# Patient Record
Sex: FEMALE | Race: WHITE | Employment: FULL TIME | URBAN - METROPOLITAN AREA
[De-identification: names, ages, dates, MRNs, and addresses within clinical notes are randomized per-mention and may not be internally consistent; named-entity substitution may affect disease eponyms.]

---

## 2019-06-24 PROCEDURE — 84300 ASSAY OF URINE SODIUM: CPT | Performed by: FAMILY MEDICINE

## 2019-06-24 PROCEDURE — 87624 HPV HI-RISK TYP POOLED RSLT: CPT | Performed by: FAMILY MEDICINE

## 2019-06-24 PROCEDURE — 88175 CYTOPATH C/V AUTO FLUID REDO: CPT | Performed by: FAMILY MEDICINE

## 2019-08-06 PROBLEM — M54.16 RIGHT LUMBAR RADICULOPATHY: Status: ACTIVE | Noted: 2019-08-06

## 2019-09-13 PROCEDURE — 84133 ASSAY OF URINE POTASSIUM: CPT | Performed by: INTERNAL MEDICINE

## 2019-09-13 PROCEDURE — 84540 ASSAY OF URINE/UREA-N: CPT | Performed by: INTERNAL MEDICINE

## 2019-09-13 PROCEDURE — 84300 ASSAY OF URINE SODIUM: CPT | Performed by: INTERNAL MEDICINE

## 2019-09-13 PROCEDURE — 83935 ASSAY OF URINE OSMOLALITY: CPT | Performed by: INTERNAL MEDICINE

## 2019-09-13 PROCEDURE — 83930 ASSAY OF BLOOD OSMOLALITY: CPT | Performed by: INTERNAL MEDICINE

## 2019-09-23 PROCEDURE — 87075 CULTR BACTERIA EXCEPT BLOOD: CPT | Performed by: OPHTHALMOLOGY

## 2019-09-23 PROCEDURE — 87205 SMEAR GRAM STAIN: CPT | Performed by: OPHTHALMOLOGY

## 2019-09-23 PROCEDURE — 87070 CULTURE OTHR SPECIMN AEROBIC: CPT | Performed by: OPHTHALMOLOGY

## 2019-10-04 PROCEDURE — 87206 SMEAR FLUORESCENT/ACID STAI: CPT | Performed by: OPHTHALMOLOGY

## 2019-10-04 PROCEDURE — 87102 FUNGUS ISOLATION CULTURE: CPT | Performed by: OPHTHALMOLOGY

## 2020-05-26 PROBLEM — M75.102 ROTATOR CUFF SYNDROME, LEFT: Status: ACTIVE | Noted: 2020-05-26

## 2020-07-15 PROBLEM — M75.122 COMPLETE ROTATOR CUFF TEAR OF LEFT SHOULDER: Status: ACTIVE | Noted: 2020-07-15

## 2020-07-15 PROBLEM — S46.812A FULL THICKNESS TEAR OF LEFT SUBSCAPULARIS TENDON: Status: ACTIVE | Noted: 2020-07-15

## 2021-05-21 PROBLEM — M48.062 SPINAL STENOSIS OF LUMBAR REGION WITH NEUROGENIC CLAUDICATION: Status: ACTIVE | Noted: 2021-05-21

## 2021-05-21 PROBLEM — M43.16 SPONDYLOLISTHESIS OF LUMBAR REGION: Status: ACTIVE | Noted: 2021-05-21

## 2021-08-16 PROBLEM — M54.16 LUMBAR RADICULOPATHY: Status: ACTIVE | Noted: 2021-08-16

## 2021-09-07 PROBLEM — G62.9 NEUROPATHY: Status: ACTIVE | Noted: 2021-09-07

## 2021-09-07 PROBLEM — M21.372 FOOT DROP, BILATERAL: Status: ACTIVE | Noted: 2021-09-07

## 2021-09-07 PROBLEM — R26.81 UNSTABLE GAIT: Status: ACTIVE | Noted: 2021-09-07

## 2021-09-07 PROBLEM — M21.371 FOOT DROP, BILATERAL: Status: ACTIVE | Noted: 2021-09-07

## 2021-10-04 RX ORDER — ACETAMINOPHEN 500 MG
1000 TABLET ORAL ONCE
Status: CANCELLED | OUTPATIENT
Start: 2021-10-04 | End: 2021-10-04

## 2021-10-16 ENCOUNTER — LABORATORY ENCOUNTER (OUTPATIENT)
Dept: LAB | Age: 61
End: 2021-10-16
Attending: ORTHOPAEDIC SURGERY
Payer: COMMERCIAL

## 2021-10-16 DIAGNOSIS — Z01.818 PRE-OP TESTING: ICD-10-CM

## 2021-10-16 DIAGNOSIS — G62.9 NEUROPATHY: ICD-10-CM

## 2021-10-16 PROCEDURE — 87081 CULTURE SCREEN ONLY: CPT

## 2021-10-16 PROCEDURE — 86900 BLOOD TYPING SEROLOGIC ABO: CPT

## 2021-10-16 PROCEDURE — 86901 BLOOD TYPING SEROLOGIC RH(D): CPT

## 2021-10-16 PROCEDURE — 86850 RBC ANTIBODY SCREEN: CPT

## 2021-10-21 ENCOUNTER — ANESTHESIA EVENT (OUTPATIENT)
Dept: SURGERY | Facility: HOSPITAL | Age: 61
DRG: 455 | End: 2021-10-21
Payer: COMMERCIAL

## 2021-10-22 NOTE — PAT NURSING NOTE
Dr. Eyal Orlando reviewed patients chart for low sodium. Order received to obtain a note of medical clearance. Faxed request to PCP as well as a courtesy fax to the surgeon and fax confirmation received.   Telephoned Dr. Mariusz Berman office and spoke wit

## 2021-10-25 NOTE — PAT NURSING NOTE
Called PCP office and spoke with Fransisca Arguello RN requesting medical clearance be faxed over ASAP.

## 2021-10-25 NOTE — PAT NURSING NOTE
Spoke with Pippa Dye RN at PCP office requesting medical clearance for surgery tomorrow be faxed over when completed. Pt is in the office now.

## 2021-10-26 ENCOUNTER — HOSPITAL ENCOUNTER (INPATIENT)
Facility: HOSPITAL | Age: 61
LOS: 7 days | Discharge: NURSING, REHAB OR LONG-TERM CARE | DRG: 455 | End: 2021-11-02
Attending: ORTHOPAEDIC SURGERY | Admitting: ORTHOPAEDIC SURGERY
Payer: COMMERCIAL

## 2021-10-26 ENCOUNTER — APPOINTMENT (OUTPATIENT)
Dept: GENERAL RADIOLOGY | Facility: HOSPITAL | Age: 61
DRG: 455 | End: 2021-10-26
Attending: ORTHOPAEDIC SURGERY
Payer: COMMERCIAL

## 2021-10-26 ENCOUNTER — ANESTHESIA (OUTPATIENT)
Dept: SURGERY | Facility: HOSPITAL | Age: 61
DRG: 455 | End: 2021-10-26
Payer: COMMERCIAL

## 2021-10-26 DIAGNOSIS — G62.9 NEUROPATHY: Primary | ICD-10-CM

## 2021-10-26 DIAGNOSIS — M43.16 SPONDYLOLISTHESIS OF LUMBAR REGION: ICD-10-CM

## 2021-10-26 PROCEDURE — 4A11X4G MONITORING OF PERIPHERAL NERVOUS ELECTRICAL ACTIVITY, INTRAOPERATIVE, EXTERNAL APPROACH: ICD-10-PCS | Performed by: ORTHOPAEDIC SURGERY

## 2021-10-26 PROCEDURE — 95861 NEEDLE EMG 2 EXTREMITIES: CPT | Performed by: ORTHOPAEDIC SURGERY

## 2021-10-26 PROCEDURE — 76000 FLUOROSCOPY <1 HR PHYS/QHP: CPT | Performed by: ORTHOPAEDIC SURGERY

## 2021-10-26 PROCEDURE — 0SG0071 FUSION OF LUMBAR VERTEBRAL JOINT WITH AUTOLOGOUS TISSUE SUBSTITUTE, POSTERIOR APPROACH, POSTERIOR COLUMN, OPEN APPROACH: ICD-10-PCS | Performed by: ORTHOPAEDIC SURGERY

## 2021-10-26 PROCEDURE — 01NB0ZZ RELEASE LUMBAR NERVE, OPEN APPROACH: ICD-10-PCS | Performed by: ORTHOPAEDIC SURGERY

## 2021-10-26 PROCEDURE — 0SG00A0 FUSION OF LUMBAR VERTEBRAL JOINT WITH INTERBODY FUSION DEVICE, ANTERIOR APPROACH, ANTERIOR COLUMN, OPEN APPROACH: ICD-10-PCS | Performed by: ORTHOPAEDIC SURGERY

## 2021-10-26 PROCEDURE — 95940 IONM IN OPERATNG ROOM 15 MIN: CPT | Performed by: ORTHOPAEDIC SURGERY

## 2021-10-26 PROCEDURE — 0ST20ZZ RESECTION OF LUMBAR VERTEBRAL DISC, OPEN APPROACH: ICD-10-PCS | Performed by: ORTHOPAEDIC SURGERY

## 2021-10-26 PROCEDURE — 95938 SOMATOSENSORY TESTING: CPT | Performed by: ORTHOPAEDIC SURGERY

## 2021-10-26 DEVICE — RELINE MAS MOD SCREW 6.5X45 2C: Type: IMPLANTABLE DEVICE | Site: BACK | Status: FUNCTIONAL

## 2021-10-26 DEVICE — RELINE LCK SCRW 5.5 OPEN TULIP: Type: IMPLANTABLE DEVICE | Site: BACK | Status: FUNCTIONAL

## 2021-10-26 DEVICE — OSTEOCEL PRO MEDIUM: Type: IMPLANTABLE DEVICE | Site: BACK | Status: FUNCTIONAL

## 2021-10-26 DEVICE — RELINE MAS TI ROD 5.5X35 LRDTC: Type: IMPLANTABLE DEVICE | Site: BACK | Status: FUNCTIONAL

## 2021-10-26 DEVICE — MODULUS XLW, 10X22X55MM 15°
Type: IMPLANTABLE DEVICE | Site: BACK | Status: FUNCTIONAL
Brand: MODULUS

## 2021-10-26 DEVICE — RELINE MAS RED SCREW 6.5X45 2C: Type: IMPLANTABLE DEVICE | Site: BACK | Status: FUNCTIONAL

## 2021-10-26 DEVICE — RELINE MAS MOD REDUCTION EXT: Type: IMPLANTABLE DEVICE | Site: BACK | Status: FUNCTIONAL

## 2021-10-26 DEVICE — I-FACTOR PUTTY, 1.0CC SYRINGE
Type: IMPLANTABLE DEVICE | Site: BACK | Status: FUNCTIONAL
Brand: I-FACTOR PEPTIDE ENHANCED BONE GRAFT

## 2021-10-26 RX ORDER — NALOXONE HYDROCHLORIDE 0.4 MG/ML
80 INJECTION, SOLUTION INTRAMUSCULAR; INTRAVENOUS; SUBCUTANEOUS AS NEEDED
Status: DISCONTINUED | OUTPATIENT
Start: 2021-10-26 | End: 2021-10-26 | Stop reason: HOSPADM

## 2021-10-26 RX ORDER — DIAZEPAM 5 MG/ML
5 INJECTION, SOLUTION INTRAMUSCULAR; INTRAVENOUS
Status: DISCONTINUED | OUTPATIENT
Start: 2021-10-26 | End: 2021-10-26 | Stop reason: HOSPADM

## 2021-10-26 RX ORDER — SODIUM CHLORIDE, SODIUM LACTATE, POTASSIUM CHLORIDE, CALCIUM CHLORIDE 600; 310; 30; 20 MG/100ML; MG/100ML; MG/100ML; MG/100ML
INJECTION, SOLUTION INTRAVENOUS CONTINUOUS
Status: DISCONTINUED | OUTPATIENT
Start: 2021-10-26 | End: 2021-10-26 | Stop reason: HOSPADM

## 2021-10-26 RX ORDER — MORPHINE SULFATE 4 MG/ML
4 INJECTION, SOLUTION INTRAMUSCULAR; INTRAVENOUS EVERY 2 HOUR PRN
Status: DISCONTINUED | OUTPATIENT
Start: 2021-10-26 | End: 2021-11-02

## 2021-10-26 RX ORDER — DEXAMETHASONE SODIUM PHOSPHATE 4 MG/ML
4 VIAL (ML) INJECTION AS NEEDED
Status: DISCONTINUED | OUTPATIENT
Start: 2021-10-26 | End: 2021-10-26 | Stop reason: HOSPADM

## 2021-10-26 RX ORDER — OXYCODONE HYDROCHLORIDE AND ACETAMINOPHEN 5; 325 MG/1; MG/1
2 TABLET ORAL EVERY 4 HOURS PRN
Status: DISCONTINUED | OUTPATIENT
Start: 2021-10-26 | End: 2021-11-02

## 2021-10-26 RX ORDER — OXYCODONE HYDROCHLORIDE AND ACETAMINOPHEN 5; 325 MG/1; MG/1
1 TABLET ORAL EVERY 4 HOURS PRN
Status: DISCONTINUED | OUTPATIENT
Start: 2021-10-26 | End: 2021-11-02

## 2021-10-26 RX ORDER — METOCLOPRAMIDE HYDROCHLORIDE 5 MG/ML
INJECTION INTRAMUSCULAR; INTRAVENOUS AS NEEDED
Status: DISCONTINUED | OUTPATIENT
Start: 2021-10-26 | End: 2021-10-26 | Stop reason: SURG

## 2021-10-26 RX ORDER — TRAZODONE HYDROCHLORIDE 50 MG/1
50 TABLET ORAL NIGHTLY PRN
Status: DISCONTINUED | OUTPATIENT
Start: 2021-10-26 | End: 2021-11-02

## 2021-10-26 RX ORDER — METOCLOPRAMIDE HYDROCHLORIDE 5 MG/ML
10 INJECTION INTRAMUSCULAR; INTRAVENOUS AS NEEDED
Status: DISCONTINUED | OUTPATIENT
Start: 2021-10-26 | End: 2021-10-26 | Stop reason: HOSPADM

## 2021-10-26 RX ORDER — ONDANSETRON 2 MG/ML
4 INJECTION INTRAMUSCULAR; INTRAVENOUS EVERY 4 HOURS PRN
Status: ACTIVE | OUTPATIENT
Start: 2021-10-26 | End: 2021-10-27

## 2021-10-26 RX ORDER — LIDOCAINE HYDROCHLORIDE AND EPINEPHRINE 10; 10 MG/ML; UG/ML
INJECTION, SOLUTION INFILTRATION; PERINEURAL AS NEEDED
Status: DISCONTINUED | OUTPATIENT
Start: 2021-10-26 | End: 2021-10-26 | Stop reason: HOSPADM

## 2021-10-26 RX ORDER — GABAPENTIN 300 MG/1
600 CAPSULE ORAL NIGHTLY
Status: DISCONTINUED | OUTPATIENT
Start: 2021-10-26 | End: 2021-10-26

## 2021-10-26 RX ORDER — ACETAMINOPHEN 500 MG
1000 TABLET ORAL ONCE AS NEEDED
Status: DISCONTINUED | OUTPATIENT
Start: 2021-10-26 | End: 2021-10-26 | Stop reason: HOSPADM

## 2021-10-26 RX ORDER — SODIUM PHOSPHATE, DIBASIC AND SODIUM PHOSPHATE, MONOBASIC 7; 19 G/133ML; G/133ML
1 ENEMA RECTAL ONCE AS NEEDED
Status: DISCONTINUED | OUTPATIENT
Start: 2021-10-26 | End: 2021-11-02

## 2021-10-26 RX ORDER — LISINOPRIL 20 MG/1
20 TABLET ORAL DAILY
COMMUNITY
End: 2022-01-26

## 2021-10-26 RX ORDER — PROCHLORPERAZINE EDISYLATE 5 MG/ML
10 INJECTION INTRAMUSCULAR; INTRAVENOUS EVERY 6 HOURS PRN
Status: ACTIVE | OUTPATIENT
Start: 2021-10-26 | End: 2021-10-28

## 2021-10-26 RX ORDER — ONDANSETRON 2 MG/ML
INJECTION INTRAMUSCULAR; INTRAVENOUS AS NEEDED
Status: DISCONTINUED | OUTPATIENT
Start: 2021-10-26 | End: 2021-10-26 | Stop reason: SURG

## 2021-10-26 RX ORDER — DEXAMETHASONE SODIUM PHOSPHATE 4 MG/ML
VIAL (ML) INJECTION AS NEEDED
Status: DISCONTINUED | OUTPATIENT
Start: 2021-10-26 | End: 2021-10-26 | Stop reason: SURG

## 2021-10-26 RX ORDER — ZOLPIDEM TARTRATE 5 MG/1
5 TABLET ORAL NIGHTLY PRN
Status: DISCONTINUED | OUTPATIENT
Start: 2021-10-26 | End: 2021-11-02

## 2021-10-26 RX ORDER — GABAPENTIN 100 MG/1
200 CAPSULE ORAL 2 TIMES DAILY
Status: DISCONTINUED | OUTPATIENT
Start: 2021-10-26 | End: 2021-10-29

## 2021-10-26 RX ORDER — SCOLOPAMINE TRANSDERMAL SYSTEM 1 MG/1
PATCH, EXTENDED RELEASE TRANSDERMAL
Status: DISCONTINUED
Start: 2021-10-26 | End: 2021-10-26 | Stop reason: WASHOUT

## 2021-10-26 RX ORDER — DOCUSATE SODIUM 100 MG/1
100 CAPSULE, LIQUID FILLED ORAL 2 TIMES DAILY
Status: DISCONTINUED | OUTPATIENT
Start: 2021-10-26 | End: 2021-11-02

## 2021-10-26 RX ORDER — DIAZEPAM 5 MG/1
5 TABLET ORAL EVERY 6 HOURS PRN
Status: DISCONTINUED | OUTPATIENT
Start: 2021-10-26 | End: 2021-11-02

## 2021-10-26 RX ORDER — METHYLPREDNISOLONE ACETATE 40 MG/ML
INJECTION, SUSPENSION INTRA-ARTICULAR; INTRALESIONAL; INTRAMUSCULAR; SOFT TISSUE AS NEEDED
Status: DISCONTINUED | OUTPATIENT
Start: 2021-10-26 | End: 2021-10-26 | Stop reason: HOSPADM

## 2021-10-26 RX ORDER — LIDOCAINE HYDROCHLORIDE 10 MG/ML
INJECTION, SOLUTION EPIDURAL; INFILTRATION; INTRACAUDAL; PERINEURAL AS NEEDED
Status: DISCONTINUED | OUTPATIENT
Start: 2021-10-26 | End: 2021-10-26 | Stop reason: SURG

## 2021-10-26 RX ORDER — MIDAZOLAM HYDROCHLORIDE 1 MG/ML
INJECTION INTRAMUSCULAR; INTRAVENOUS AS NEEDED
Status: DISCONTINUED | OUTPATIENT
Start: 2021-10-26 | End: 2021-10-26 | Stop reason: SURG

## 2021-10-26 RX ORDER — BISACODYL 10 MG
10 SUPPOSITORY, RECTAL RECTAL
Status: DISCONTINUED | OUTPATIENT
Start: 2021-10-26 | End: 2021-11-02

## 2021-10-26 RX ORDER — SODIUM CHLORIDE 9 MG/ML
INJECTION, SOLUTION INTRAVENOUS CONTINUOUS PRN
Status: DISCONTINUED | OUTPATIENT
Start: 2021-10-26 | End: 2021-10-26 | Stop reason: SURG

## 2021-10-26 RX ORDER — AMLODIPINE BESYLATE 5 MG/1
5 TABLET ORAL DAILY
COMMUNITY
End: 2021-11-02

## 2021-10-26 RX ORDER — REMIFENTANIL HYDROCHLORIDE 1 MG/ML
INJECTION, POWDER, LYOPHILIZED, FOR SOLUTION INTRAVENOUS AS NEEDED
Status: DISCONTINUED | OUTPATIENT
Start: 2021-10-26 | End: 2021-10-26 | Stop reason: SURG

## 2021-10-26 RX ORDER — UPADACITINIB 15 MG/1
15 TABLET, EXTENDED RELEASE ORAL DAILY
Status: ON HOLD | COMMUNITY
End: 2021-10-27

## 2021-10-26 RX ORDER — CEFAZOLIN SODIUM/WATER 2 G/20 ML
2 SYRINGE (ML) INTRAVENOUS EVERY 8 HOURS
Status: COMPLETED | OUTPATIENT
Start: 2021-10-26 | End: 2021-10-27

## 2021-10-26 RX ORDER — ROCURONIUM BROMIDE 10 MG/ML
INJECTION, SOLUTION INTRAVENOUS AS NEEDED
Status: DISCONTINUED | OUTPATIENT
Start: 2021-10-26 | End: 2021-10-26 | Stop reason: SURG

## 2021-10-26 RX ORDER — ACETAMINOPHEN 325 MG/1
650 TABLET ORAL EVERY 4 HOURS PRN
Status: DISCONTINUED | OUTPATIENT
Start: 2021-10-26 | End: 2021-11-02

## 2021-10-26 RX ORDER — VANCOMYCIN HYDROCHLORIDE 1 G/20ML
INJECTION, POWDER, LYOPHILIZED, FOR SOLUTION INTRAVENOUS AS NEEDED
Status: DISCONTINUED | OUTPATIENT
Start: 2021-10-26 | End: 2021-10-26 | Stop reason: HOSPADM

## 2021-10-26 RX ORDER — HYDROMORPHONE HYDROCHLORIDE 1 MG/ML
0.4 INJECTION, SOLUTION INTRAMUSCULAR; INTRAVENOUS; SUBCUTANEOUS EVERY 5 MIN PRN
Status: DISCONTINUED | OUTPATIENT
Start: 2021-10-26 | End: 2021-10-26 | Stop reason: HOSPADM

## 2021-10-26 RX ORDER — GABAPENTIN 300 MG/1
300 CAPSULE ORAL DAILY
Status: DISCONTINUED | OUTPATIENT
Start: 2021-10-27 | End: 2021-10-26

## 2021-10-26 RX ORDER — MIDAZOLAM HYDROCHLORIDE 1 MG/ML
1 INJECTION INTRAMUSCULAR; INTRAVENOUS EVERY 5 MIN PRN
Status: DISCONTINUED | OUTPATIENT
Start: 2021-10-26 | End: 2021-10-26 | Stop reason: HOSPADM

## 2021-10-26 RX ORDER — OXYCODONE HYDROCHLORIDE AND ACETAMINOPHEN 5; 325 MG/1; MG/1
1 TABLET ORAL EVERY 6 HOURS PRN
Qty: 50 TABLET | Refills: 0 | Status: SHIPPED | OUTPATIENT
Start: 2021-10-26 | End: 2021-11-09

## 2021-10-26 RX ORDER — POLYETHYLENE GLYCOL 3350 17 G/17G
17 POWDER, FOR SOLUTION ORAL DAILY PRN
Status: DISCONTINUED | OUTPATIENT
Start: 2021-10-26 | End: 2021-11-02

## 2021-10-26 RX ORDER — MORPHINE SULFATE 2 MG/ML
2 INJECTION, SOLUTION INTRAMUSCULAR; INTRAVENOUS EVERY 2 HOUR PRN
Status: DISCONTINUED | OUTPATIENT
Start: 2021-10-26 | End: 2021-11-02

## 2021-10-26 RX ORDER — BUPIVACAINE HYDROCHLORIDE 2.5 MG/ML
INJECTION, SOLUTION EPIDURAL; INFILTRATION; INTRACAUDAL AS NEEDED
Status: DISCONTINUED | OUTPATIENT
Start: 2021-10-26 | End: 2021-10-26 | Stop reason: HOSPADM

## 2021-10-26 RX ORDER — CYCLOBENZAPRINE HCL 5 MG
5 TABLET ORAL EVERY 6 HOURS PRN
Status: DISCONTINUED | OUTPATIENT
Start: 2021-10-26 | End: 2021-11-02

## 2021-10-26 RX ORDER — DIPHENHYDRAMINE HYDROCHLORIDE 50 MG/ML
25 INJECTION INTRAMUSCULAR; INTRAVENOUS EVERY 4 HOURS PRN
Status: DISCONTINUED | OUTPATIENT
Start: 2021-10-26 | End: 2021-11-02

## 2021-10-26 RX ORDER — MEPERIDINE HYDROCHLORIDE 25 MG/ML
12.5 INJECTION INTRAMUSCULAR; INTRAVENOUS; SUBCUTANEOUS AS NEEDED
Status: DISCONTINUED | OUTPATIENT
Start: 2021-10-26 | End: 2021-10-26 | Stop reason: HOSPADM

## 2021-10-26 RX ORDER — HYDROCODONE BITARTRATE AND ACETAMINOPHEN 5; 325 MG/1; MG/1
1 TABLET ORAL AS NEEDED
Status: DISCONTINUED | OUTPATIENT
Start: 2021-10-26 | End: 2021-10-26 | Stop reason: HOSPADM

## 2021-10-26 RX ORDER — DIAZEPAM 5 MG/ML
INJECTION, SOLUTION INTRAMUSCULAR; INTRAVENOUS
Status: COMPLETED
Start: 2021-10-26 | End: 2021-10-26

## 2021-10-26 RX ORDER — LABETALOL HYDROCHLORIDE 5 MG/ML
5 INJECTION, SOLUTION INTRAVENOUS EVERY 5 MIN PRN
Status: DISCONTINUED | OUTPATIENT
Start: 2021-10-26 | End: 2021-10-26 | Stop reason: HOSPADM

## 2021-10-26 RX ORDER — DIPHENHYDRAMINE HCL 25 MG
25 CAPSULE ORAL EVERY 4 HOURS PRN
Status: DISCONTINUED | OUTPATIENT
Start: 2021-10-26 | End: 2021-11-02

## 2021-10-26 RX ORDER — SENNOSIDES 8.6 MG
17.2 TABLET ORAL NIGHTLY
Status: DISCONTINUED | OUTPATIENT
Start: 2021-10-26 | End: 2021-11-02

## 2021-10-26 RX ORDER — CEFAZOLIN SODIUM/WATER 2 G/20 ML
2 SYRINGE (ML) INTRAVENOUS ONCE
Status: COMPLETED | OUTPATIENT
Start: 2021-10-26 | End: 2021-10-26

## 2021-10-26 RX ORDER — SODIUM CHLORIDE, SODIUM LACTATE, POTASSIUM CHLORIDE, CALCIUM CHLORIDE 600; 310; 30; 20 MG/100ML; MG/100ML; MG/100ML; MG/100ML
INJECTION, SOLUTION INTRAVENOUS CONTINUOUS
Status: DISCONTINUED | OUTPATIENT
Start: 2021-10-26 | End: 2021-11-02

## 2021-10-26 RX ORDER — HYDROCODONE BITARTRATE AND ACETAMINOPHEN 5; 325 MG/1; MG/1
2 TABLET ORAL AS NEEDED
Status: DISCONTINUED | OUTPATIENT
Start: 2021-10-26 | End: 2021-10-26 | Stop reason: HOSPADM

## 2021-10-26 RX ORDER — ONDANSETRON 2 MG/ML
4 INJECTION INTRAMUSCULAR; INTRAVENOUS AS NEEDED
Status: DISCONTINUED | OUTPATIENT
Start: 2021-10-26 | End: 2021-10-26 | Stop reason: HOSPADM

## 2021-10-26 RX ORDER — MORPHINE SULFATE 2 MG/ML
1 INJECTION, SOLUTION INTRAMUSCULAR; INTRAVENOUS EVERY 2 HOUR PRN
Status: DISCONTINUED | OUTPATIENT
Start: 2021-10-26 | End: 2021-11-02

## 2021-10-26 RX ADMIN — DEXAMETHASONE SODIUM PHOSPHATE 8 MG: 4 MG/ML VIAL (ML) INJECTION at 10:54:00

## 2021-10-26 RX ADMIN — LIDOCAINE HYDROCHLORIDE 50 MG: 10 INJECTION, SOLUTION EPIDURAL; INFILTRATION; INTRACAUDAL; PERINEURAL at 10:46:00

## 2021-10-26 RX ADMIN — METOCLOPRAMIDE HYDROCHLORIDE 10 MG: 5 INJECTION INTRAMUSCULAR; INTRAVENOUS at 13:01:00

## 2021-10-26 RX ADMIN — CEFAZOLIN SODIUM/WATER 2 G: 2 G/20 ML SYRINGE (ML) INTRAVENOUS at 10:58:00

## 2021-10-26 RX ADMIN — SODIUM CHLORIDE, SODIUM LACTATE, POTASSIUM CHLORIDE, CALCIUM CHLORIDE: 600; 310; 30; 20 INJECTION, SOLUTION INTRAVENOUS at 10:55:00

## 2021-10-26 RX ADMIN — ROCURONIUM BROMIDE 10 MG: 10 INJECTION, SOLUTION INTRAVENOUS at 10:46:00

## 2021-10-26 RX ADMIN — MIDAZOLAM HYDROCHLORIDE 3 MG: 1 INJECTION INTRAMUSCULAR; INTRAVENOUS at 10:34:00

## 2021-10-26 RX ADMIN — SODIUM CHLORIDE, SODIUM LACTATE, POTASSIUM CHLORIDE, CALCIUM CHLORIDE: 600; 310; 30; 20 INJECTION, SOLUTION INTRAVENOUS at 10:29:00

## 2021-10-26 RX ADMIN — ONDANSETRON 4 MG: 2 INJECTION INTRAMUSCULAR; INTRAVENOUS at 13:01:00

## 2021-10-26 RX ADMIN — SODIUM CHLORIDE: 9 INJECTION, SOLUTION INTRAVENOUS at 13:07:00

## 2021-10-26 RX ADMIN — REMIFENTANIL HYDROCHLORIDE 20 MCG: 1 INJECTION, POWDER, LYOPHILIZED, FOR SOLUTION INTRAVENOUS at 13:15:00

## 2021-10-26 NOTE — ANESTHESIA POSTPROCEDURE EVALUATION
[address] Patient Status:  Inpatient   Age/Gender 57year old female MRN [id number]   Location [address] [facility] Attending Nikita Mcclelland MD   Hosp Day # 0 PCP Nishi Wilhelm MD       Anesthesia

## 2021-10-26 NOTE — CONSULTS
[facility] Hospitalist Consult Note     Mark Tang Patient Status:  Inpatient    1960 MRN [id number]   [facility] 3SW-A Attending Alexey Kim MD   Hosp Day # 0 PCP Jocelyn Baeza MD     Consult: Jemal Sawyer cell carcinoma   • Heart Disorder Father         a fib    • Thyroid Disorder Mother         goiter, thyroid surgery, now on Synthroid   • Breast Cancer Maternal Aunt 65        60's       Allergies: No Known Allergies    Medications:  No current facility-ad nondistended. Positive bowel sounds. No rebound, guarding or organomegaly. Back brace in place   Neurologic: No focal neurological deficits. CNII-XII grossly intact. Bilateral pedal sensation to light touch diminished but at baseline per patient.    Muscul

## 2021-10-26 NOTE — H&P
Patient: Deon Becker  Medical Record Number: [id number]  Referring Physician:  Celi Smith  PCP: Diane Guaman MD        HISTORY OF CHIEF COMPLAINT:    Deon Becker is a 57year old female, Patient is here today ref by Kennedy Agee Diabetes Maternal Grandmother           type 2 diabetes   • Other (cva) Maternal Grandmother     • Diabetes Paternal Grandmother           type 2 diabetes   • Psychiatric Sister           anxiety   • Genito-Urinary Disorder Father           BPH   • Cancer EXAMIMATION   PHYSICAL EXAMINATION: Merna Quintanilla is a 57year old female who is observed sitting in the exam room alert and oriented times three. RESPIRATORY RATE: 18 She looks consistent her stated age.     Ht 5' 2\" (1.575 m)   Wt 130 lb (59 kg) however the patient's sagittal plane demonstrates a grade 1 spondylolisthesis with severe degenerative disc disease L4-5 there is notable degenerative disc disease throughout the lumbar spine  MRI: MRI demonstrates severe stenosis both centrally in the lat resolved. By 18 months postoperatively, there is a 10% risk of persistent sensory deficit and a 3% risk of extremity weakness due to to this specific approach.   The majority of lower extremity weakness is a result of psoas muscle injury from the exposure, restart.      She  has had previous anesthesia:  No.  Previous complications:  No, but no previous general anesthesia.  Previous spinal >30 years ago without complication.      Social History    Tobacco Use      Smoking status: Former Smoker        Packs/da point review of symptoms completed with pertinent positives and negatives as listed in the above HPI.      PHYSICAL EXAM:   /80   Pulse 86   Resp 18   Wt 130 lb (59 kg)   SpO2 98%   BMI 23.78 kg/m²    Repeat BP  / 72  General: awake, alert in the anesthesiologist of record, as well as the surgeon and patient .      Elsy Berry  10/25/2021    The above referenced H&P was reviewed by Andrew Gonzalez PA-C on 10/26/2021, and no significant changes have occurred in the patient's condition since

## 2021-10-26 NOTE — ANESTHESIA PREPROCEDURE EVALUATION
PRE-OP EVALUATION    Patient Name: Leo Bone    Admit Diagnosis: Spondylolisthesis of lumbar region [M43.16]    Pre-op Diagnosis: Spondylolisthesis of lumbar region [M43.16]    Lumbar 4 - Lumbar 5 Extreme Lateral Interbody Fusion with Posterior allergies. Anesthesia Evaluation    Patient summary reviewed. Anesthetic Complications  (-) history of anesthetic complications         GI/Hepatic/Renal                                 Cardiovascular      ECG reviewed.   Exercise tolerance: good patient. Comment: D/w the patient the risks and benefits of general anesthesia including sore throat, nausea, and intraoperative awareness.     Plan/risks discussed with: patient                Present on Admission:  **None**

## 2021-10-26 NOTE — PROGRESS NOTES
NURSING ADMISSION NOTE      Patient admitted via bed. Oriented to room. Safety precautions initiated. Bed in low position. Call light in reach. Patient alert and oriented. Back to right shoulder and lower back.  Ioban dressings in place, CDI to

## 2021-10-26 NOTE — PROGRESS NOTES
S: Patient awake in PACU. Severe back pain. No numbness or tingling. Inspection:  Awake alert No acute distress. No difficulty breathing     Blood pressure (!) 172/91, pulse 67, temperature 97.8 °F (36.6 °C), temperature source Temporal, resp.  rate 20,

## 2021-10-26 NOTE — ANESTHESIA PROCEDURE NOTES
Airway  Date/Time: 10/26/2021 10:47 AM  Urgency: elective    Airway not difficult    General Information and Staff    Patient location during procedure: OR  Anesthesiologist: Dominguez Johnson MD  Performed: anesthesiologist     Indications and Patient Condi

## 2021-10-26 NOTE — BRIEF OP NOTE
Pre-Operative Diagnosis: Spondylolisthesis of lumbar region [M43.16]     Post-Operative Diagnosis: Spondylolisthesis of lumbar region [M43.16]      Procedure Performed:   Lumbar 4 - Lumbar 5 Extreme Lateral Interbody Fusion with Posterior Screws Left Sided

## 2021-10-26 NOTE — OPERATIVE REPORT
Operative Report  Patient Name:  Lynn Doherty  Date: 10/26/2021  Preoperative Diagnosis: Lumbar Radiculopathy, Degenerative Disc Disease, Spondylolisthesis  Postoperative Diagnosis: Same  Primary Surgeon: Renetta Mccartney MD  Assistant: Mai Vasques fluoroscopic image for the lateral incision and the posteriorly for the disk spaces. A 2 inch skin incision was made on the left. Electrocautery was used to incise through the subcutaneous fat. Obliques were dissected using blunt finger dissection.  Remi Decker facet joint, pars and transverse process was palpated. Pedicle screws were placed using standard fluoroscopically guided pedicle screw technique.   Jamshidis' were placed at the lateral border of the pedicle and inserted to a depth of 25-30mm using AP flour from anesthesia and transferred to the PACU in stable condition.                   A physician assistant was necessary during the case to provide positioning, exposure, hemostasis, safety and retraction and to manage wound suction so that I could operate wi

## 2021-10-27 ENCOUNTER — APPOINTMENT (OUTPATIENT)
Dept: MRI IMAGING | Facility: HOSPITAL | Age: 61
DRG: 455 | End: 2021-10-27
Attending: PHYSICIAN ASSISTANT
Payer: COMMERCIAL

## 2021-10-27 ENCOUNTER — APPOINTMENT (OUTPATIENT)
Dept: CT IMAGING | Facility: HOSPITAL | Age: 61
DRG: 455 | End: 2021-10-27
Attending: PHYSICIAN ASSISTANT
Payer: COMMERCIAL

## 2021-10-27 PROCEDURE — 72131 CT LUMBAR SPINE W/O DYE: CPT | Performed by: PHYSICIAN ASSISTANT

## 2021-10-27 PROCEDURE — 97530 THERAPEUTIC ACTIVITIES: CPT

## 2021-10-27 PROCEDURE — 97535 SELF CARE MNGMENT TRAINING: CPT

## 2021-10-27 PROCEDURE — 72148 MRI LUMBAR SPINE W/O DYE: CPT | Performed by: PHYSICIAN ASSISTANT

## 2021-10-27 PROCEDURE — 97162 PT EVAL MOD COMPLEX 30 MIN: CPT

## 2021-10-27 PROCEDURE — 97165 OT EVAL LOW COMPLEX 30 MIN: CPT

## 2021-10-27 PROCEDURE — 85014 HEMATOCRIT: CPT | Performed by: PHYSICIAN ASSISTANT

## 2021-10-27 PROCEDURE — 85018 HEMOGLOBIN: CPT | Performed by: PHYSICIAN ASSISTANT

## 2021-10-27 RX ORDER — LISINOPRIL 20 MG/1
20 TABLET ORAL DAILY
Status: DISCONTINUED | OUTPATIENT
Start: 2021-10-27 | End: 2021-11-02

## 2021-10-27 RX ORDER — AMLODIPINE BESYLATE 5 MG/1
5 TABLET ORAL DAILY
Status: DISCONTINUED | OUTPATIENT
Start: 2021-10-27 | End: 2021-10-31

## 2021-10-27 RX ORDER — UPADACITINIB 15 MG/1
15 TABLET, EXTENDED RELEASE ORAL DAILY
Qty: 30 TABLET | Refills: 0 | Status: SHIPPED | OUTPATIENT
Start: 2021-11-10 | End: 2021-11-24

## 2021-10-27 NOTE — CM/SW NOTE
PMR recommending acute rehab. Spoke with Chelsea Bui from [facility] who confirmed pt can be accepted for admission pending insurance auth. They are in network with pt's insurance plan. Await insurance auth and medical clearance for DC.   / to

## 2021-10-27 NOTE — OCCUPATIONAL THERAPY NOTE
OCCUPATIONAL THERAPY EVALUATION - INPATIENT     Room Number: 357/357-A  Evaluation Date: 10/27/2021  Type of Evaluation: Initial  Presenting Problem: s/p L4-5 PXLIF 10/26/21    Physician Order: IP Consult to Occupational Therapy  Reason for Therapy: ADL/IA being attacked by RA. \"    OBJECTIVE  Precautions: Spine;Bed/chair alarm  Fall Risk: High fall risk    WEIGHT BEARING RESTRICTION  Weight Bearing Restriction: None                PAIN ASSESSMENT  Ratin  Location: back  Management Techniques:  Activity p 33.39  CMS Modifier (G-Code): CK    FUNCTIONAL TRANSFER ASSESSMENT  Supine to Sit : Moderate assistance  Sit to Stand: Minimum assistance    Skilled Therapy Provided:  Activities performed this session include: Education on spinal precautions,  Patient End of Session: Up in chair; With [facility] staff;Needs met;Call light within reach;RN aware of session/findings; All patient questions and concerns addressed; Other (Comment) (PT arriving for session)    ASSESSMENT   Patient is a 57year old  female admit therapy records    Specific performance deficits impacting engagement in ADL/IADL LOW  1 - 3 performance deficits    Client Assessment/Performance Deficits LOW - No comorbidities nor modifications of tasks    Clinical Decision Making LOW - Analysis of occu

## 2021-10-27 NOTE — PROGRESS NOTES
S: Patient awake and smiling in bed. She states she has minimal back pain. She feels much better than yesterday. Has not ambulated. She is had baseline numbness and tingling in her hands. Wishes to discuss rehab options with social work today.      Wilberto Victoria

## 2021-10-27 NOTE — PLAN OF CARE
Assumed patient care at 2300. Patient alert and oriented x4. Afebrile. On 3LO2 with O2 sats above 95%. No chest pain. PRN pain medication given for lower back pain with relief. Dressings on Lateral Posterior clean, dry and intact.  On scheduled CT and MRI o

## 2021-10-27 NOTE — PHYSICAL THERAPY NOTE
PHYSICAL THERAPY EVALUATION - INPATIENT     Room Number: 357/357-A  Evaluation Date: 10/27/2021  Type of Evaluation: Initial  Physician Order: PT Eval and Treat    Presenting Problem: s/p L4-L5 FUSION  Reason for Therapy: Mobility Dysfunction and Dis following:    Right Hip flexion  3-/5  Left Hip flexion  3-/5  Right Knee extension  3+/5  Left Knee extension  3+/5  Right Dorsiflexion  3+/5  Left Dorsiflexion  3+/5    BALANCE  Static Sitting: Good  Dynamic Sitting: Fair -  Static Standing: Poor +  Jackie re: christie recs. Exercise/Education Provided:  Bed mobility  Body mechanics  Functional activity tolerated  Gait training  Transfer training    Patient End of Session: Up in chair;Needs met;Call light within reach;RN aware of session/findings; All patient device: walker - rolling at assistance level: supervision     Goal #4 Patient will negotiate 1 flight of stairs with supervision to ensure safety at home.    Goal #5    Goal #6    Goal Comments: Goals established on 10/27/2021    Writer worfranco droplet mask g

## 2021-10-27 NOTE — PLAN OF CARE
Back pain managed by Percocet. No new numbness or tingling to lower extremities other than baseline neuropathy. Worked with PT/OT - discharge planning in place for rehab. CT done, MRI still pending today.  Paged Dr Antonio Conrad to clarify about Rinvoq - ordered by

## 2021-10-27 NOTE — PROGRESS NOTES
Assisted patient to sit on side of bed. Ioban intact to back and left side. No drainage noted. Reviewed indications, side effects of pain medication/narcotics and constipation prevention.  Stressed importance of increased fluids/roughage in diet, continued

## 2021-10-27 NOTE — CM/SW NOTE
MARICARMEN met with pt to discuss discharge planning. Pt called dtr as well and was present during conversation. Pt voiced interest to go to  from rehab. SW educated pt on [facility] vs [facility], pt verbalized understanding.  Pt reported that if she could not go to [facility], she wo

## 2021-10-28 PROCEDURE — 97110 THERAPEUTIC EXERCISES: CPT

## 2021-10-28 PROCEDURE — 97530 THERAPEUTIC ACTIVITIES: CPT

## 2021-10-28 RX ORDER — PSEUDOEPHEDRINE HCL 30 MG
100 TABLET ORAL 2 TIMES DAILY
Qty: 60 CAPSULE | Refills: 0 | Status: SHIPPED | OUTPATIENT
Start: 2021-10-28 | End: 2022-01-31 | Stop reason: ALTCHOICE

## 2021-10-28 NOTE — PROGRESS NOTES
Reviewed prevention of constipation side effect  from narcotics. Teachback done again on ankle pumps and incentive spriometry use. Reviewed dressing changes and showering. Discharge education video begun for patient.  Solicited and answered any questions pt

## 2021-10-28 NOTE — CONSULTS
PHYSICAL MEDICINE AND REHABILITATION CONSULTATION       Location [facility] 3SW-A Attending Inderjit Jones MD   Hosp Day # 1 PCP Naldo Gallagher MD     Patient Identification  Ellen Gunter is a 57year old female.   :  1960  Adm (MIRALAX) powder packet 17 g, 17 g, Oral, Daily PRN  magnesium hydroxide (MILK OF MAGNESIA) 400 MG/5ML suspension 30 mL, 30 mL, Oral, Daily PRN  bisacodyl (DULCOLAX) rectal suppository 10 mg, 10 mg, Rectal, Daily PRN  Fleet Enema (FLEET) 7-19 GM/118ML enem Grandmother         type 2 diabetes   • Psychiatric Sister         anxiety   • Genito-Urinary Disorder Father         BPH   • Cancer Father         basal cell carcinoma   • Heart Disorder Father         a fib    • Thyroid Disorder Mother         goiter, th supple, symmetrical, no thyromegaly appreciated  Lymph: no cervical and no axillary lymphadenopathy  Lungs: Non labored on RA, no wheezing appreciated, No accessory muscle noted  Heart: Reg Rate, no edema noted in BLE  Abdomen: soft, non-tender, non-disten participate in 3 hours of therapy 5 days a week.   • The patient is reasonably expected to actively participate in and benefit significantly from the intensive rehabilitation therapy program within a prescribed period of time of 10  days, with goal of disch

## 2021-10-28 NOTE — PROGRESS NOTES
S: Patient awake and smiling seated in bed. She has moderate back pain which is controlled. She has baseline numbness and tingling in her hands and feet. She is eager to start the next step of her recovery and get to [facility].      Inspection:  Awake alert No ac ulcerations. LYMPH EXAM: There is no lymph edema in either lower extremity. VASCULAR EXAM:  Good distal perfusion. No clubbing or cyanosis. Calves supple, NT bilaterally  ABDOMINAL EXAM: Abdomen is soft, NT.       A/P: POD # 2 s/p L4-5 PXLIF    P: Continue

## 2021-10-28 NOTE — PLAN OF CARE
Pt is alert and oriented X4 on room air. . I/S. Bilateral SCD. Voiding freely. Last bowel movement on 10/25. Loban dressing to lower and lateral back dressing is clean, dry and intact with no drainage. PO and IV medication given for pain.  Pt went down t

## 2021-10-28 NOTE — CM/SW NOTE
MARICARMEN met with pt and updated her that insurance is still pending for MJ. SW provided [facility] list for pt in case insurance does not authorize [facility]J stay. Pt agreeable and provided understanding. Pt chose [facility] in case [facility] is not authorized.      SW to up

## 2021-10-28 NOTE — PLAN OF CARE
Patient alert and oriented x4. VSS, on RA. Pain to lower back, controlled with PO percocet. Gel ice intermittently placed depending on pt's tolerance. Miralax given this AM LBM 10/25. Ioban dressings CDI. Tolerating regular diet.  Patient transfers x1 perso

## 2021-10-28 NOTE — PHYSICAL THERAPY NOTE
PHYSICAL THERAPY TREATMENT NOTE - INPATIENT    Room Number: 357/357-A     Session: 1     Number of Visits to Meet Established Goals: 5    Presenting Problem: s/p L4-L5 FUSION    ASSESSMENT     Pt able to participate in standing circuit training exercise RESTRICTION  Weight Bearing Restriction: None                PAIN ASSESSMENT   Rating: 3  Location: incisional       BALANCE                                                                                                                       Static Sittin Sitting     Repetitions   10   Sets   1     Patient End of Session: Up in chair;Needs met;Call light within reach;RN aware of session/findings; All patient questions and concerns addressed; Alarm set       Therapist PPE: surgical mask, goggles and gloves dur

## 2021-10-28 NOTE — PROGRESS NOTES
[facility] Hospitalist Progress Note                                                                   [address]  1/20/1960    CC: FU post op    Interval History:  - Doing well      Curr spondylolisthesis with radiculopathy   RA   HTN     Recommendations     Lumbar spondylolisthesis with radiculopathy   - s/p L4-L5 fusion 10/26  - periop antibiotics, postop mgmt per drain control   - avoid antiplatelet agents  - PT/OT     RA   - cont home

## 2021-10-28 NOTE — PROGRESS NOTES
[facility] Hospitalist Progress Note                                                                   [address]  1/20/1960    CC: FU post op    Interval History:  - Doing well      Curr L4-L5 fusion     Problem List / Diagnoses     Lumbar spondylolisthesis with radiculopathy   RA   HTN     Recommendations     Lumbar spondylolisthesis with radiculopathy   - s/p L4-L5 fusion 10/26  - periop antibiotics, postop mgmt per drain control   - anshul

## 2021-10-29 PROCEDURE — 97110 THERAPEUTIC EXERCISES: CPT

## 2021-10-29 PROCEDURE — 97535 SELF CARE MNGMENT TRAINING: CPT

## 2021-10-29 PROCEDURE — 97530 THERAPEUTIC ACTIVITIES: CPT

## 2021-10-29 RX ORDER — GABAPENTIN 100 MG/1
200 CAPSULE ORAL 3 TIMES DAILY
Status: DISCONTINUED | OUTPATIENT
Start: 2021-10-29 | End: 2021-11-02

## 2021-10-29 NOTE — PROGRESS NOTES
S: Patient awake and seated in bed. She has controlled back pain. Is beginning to have bilateral shooting foot pains. Awaiting insurance auth for rehab. Inspection:  Awake alert No acute distress.  No difficulty breathing     Blood pressure 127/70, puls

## 2021-10-29 NOTE — PLAN OF CARE
Pt is alert and oriented X4 on room air. . I/S encourage. SCD. Voiding via purewick at night and freely during day. Last bowel movement 10/25. Pt denied miralax. PO medication for pain. Ioban dressing to left side is clean, dry and intact.  Reminder to u

## 2021-10-29 NOTE — PROGRESS NOTES
[facility] Hospitalist Progress Note                                                                   [address]  1/20/1960    CC: FU post op    Interval History:  - Doing well, awaiting Diagnoses     Lumbar spondylolisthesis with radiculopathy   RA   HTN     Recommendations     Lumbar spondylolisthesis with radiculopathy   - s/p L4-L5 fusion 10/26  - periop antibiotics, postop mgmt per drain control   - PT/OT     RA   [facility]

## 2021-10-29 NOTE — PLAN OF CARE
A&O x4. VSS on RA. Back pain controlled on PO medication. Ambulating with mod assist x1 w/walker. Voids freely, BRP. Bilateral Teds and SCDs. Ioban dressing C/D/I, gel ice applied as tolerated. Reviewed POC w/pt who agrees. Will continue to monitor.   Insur

## 2021-10-30 PROCEDURE — 97110 THERAPEUTIC EXERCISES: CPT

## 2021-10-30 PROCEDURE — 97116 GAIT TRAINING THERAPY: CPT

## 2021-10-30 NOTE — PLAN OF CARE
Alert and oriented,V/S stable,,O2 at 2L/nc when sleeping. FOOT DROP to both but chronic,not using any brace. Ambulates to the bathroom with one assist.Has carpal tunnel and arthritis in both hands,has pains too. Taking 2 pain medications each time,with mod

## 2021-10-30 NOTE — PHYSICAL THERAPY NOTE
PHYSICAL THERAPY TREATMENT NOTE - INPATIENT    Room Number: 357/357-A     Session: 2     Number of Visits to Meet Established Goals: 5    Presenting Problem: s/p L4-L5 FUSION    ASSESSMENT     Pt eager to work with writer this AM - completed multiple jolie Static Sitting: Good  Dynamic Sitting: Fair -           Static Standing: Poor +  Dynamic Standing: Poor +    ACTIVITY TOLERANCE                         O2 WALK         AM-PAC '6-Clicks' INPATIENT SHORT FORM - BASIC MOBILITY  Ho Therapist PPE: surgical mask, goggles and gloves during session    Patient/Family PPE: Mask on at all times

## 2021-10-30 NOTE — OCCUPATIONAL THERAPY NOTE
OCCUPATIONAL THERAPY TREATMENT NOTE - INPATIENT     Room Number: 357/357-A  Session: 1   Number of Visits to Meet Established Goals: 5    Presenting Problem: s/p L4-5 PXLIF 10/26/21    History related to current admission: Patient is 57year old female adm Degree of Impairment: 50.11%  Standardized Score (AM-PAC Scale): 37.26  CMS Modifier (G-Code): CK    FUNCTIONAL TRANSFER ASSESSMENT  Supine to Sit : Contact guard assist  Sit to Stand: Contact guard assist    Skilled Therapy Provided: Patient educated on O functional mobility d/t decreased strength, AROM, balance, coordination. Patient will benefit from continued skilled OT intervention to maximize ADL I and safety. OT Discharge Recommendations: Acute rehabilitation  OT Device Recommendations: Marianner; Sanam

## 2021-10-30 NOTE — CM/SW NOTE
MARICARMEN contacted [employer] to determine if insurance Rigo Mcqueen is obtained. [employer] states they are still showing pending, however they will contact this writer if insurance approval is obtained today. MARICARMEN to follow.

## 2021-10-30 NOTE — PROGRESS NOTES
Spoke with patient's nurse, he will reach out to social work and check on insurance clearance for rehab placement. Also spoke with patient over the phone. She states she is doing well. She has already ambulated with therapy today is is making progress.

## 2021-10-30 NOTE — PLAN OF CARE
POD 4 lumbar fusion, Pt is AAOX4, VSS, room air, IV SL, SCD's, voiding freely to restroom, BM today, up min assist w/ RW, PT / OT following, plan is discharge to rehab, waiting on INS AUTH, PO meds for pain control, Pt doing well, all needs met, all safety

## 2021-10-30 NOTE — PLAN OF CARE
Patient alert and oriented x4. VSS, on RA. Pain to lower back, controlled with PO PRN pain medication. Ioban dressings, CDI. Patient transfers x1 person assist with walker. BLE footdrop present. Numbness to BLE at baseline. Patient tolerating general diet.

## 2021-10-30 NOTE — PROGRESS NOTES
[facility] Hospitalist Progress Note                                                                   [address]  1/20/1960    CC: FU post op    Interval History:  - Doing well, awaiting spondylolisthesis with radiculopathy   RA   HTN     Recommendations     Lumbar spondylolisthesis with radiculopathy   - s/p L4-L5 fusion 10/26  - periop antibiotics, postop mgmt per drain control   - PT/OT     RA   - cont home upadacitinib      HTN  - bp s

## 2021-10-31 PROCEDURE — 97535 SELF CARE MNGMENT TRAINING: CPT

## 2021-10-31 PROCEDURE — 97530 THERAPEUTIC ACTIVITIES: CPT

## 2021-10-31 RX ORDER — AMLODIPINE BESYLATE 2.5 MG/1
2.5 TABLET ORAL DAILY
Status: DISCONTINUED | OUTPATIENT
Start: 2021-11-01 | End: 2021-11-02

## 2021-10-31 NOTE — OCCUPATIONAL THERAPY NOTE
OCCUPATIONAL THERAPY TREATMENT NOTE - INPATIENT     Room Number: 357/357-A  Session: 2   Number of Visits to Meet Established Goals: 5    Presenting Problem: s/p L4-5 PXLIF 10/26/21    History related to current admission: Patient is 57year old female adm teeth?: None  -   Eating meals?: None    AM-PAC Score:  Score: 17  Approx Degree of Impairment: 50.11%  Standardized Score (AM-PAC Scale): 37.26  CMS Modifier (G-Code): CK    FUNCTIONAL TRANSFER ASSESSMENT  Supine to Sit : Contact guard assist  Sit to [employer] right side of bed at home; mobility around bed via RW, min assist, increased time; supine via light min assist to raise LLE to bed level and cues for technique, sitting EOB via min assist to raise trunk; functional mobility around bed to bedside chair via

## 2021-10-31 NOTE — PROGRESS NOTES
[facility] Hospitalist Progress Note                                                                   [address]  1/20/1960    CC: FU post op    Interval History:  - Doing well, awaiting fusion     Problem List / Diagnoses     Lumbar spondylolisthesis with radiculopathy   RA   HTN     Recommendations     Lumbar spondylolisthesis with radiculopathy   - s/p L4-L5 fusion 10/26  - periop antibiotics, postop mgmt per drain control   - PT/OT

## 2021-10-31 NOTE — PLAN OF CARE
Pt A&Ox4. On room air. IS encouraged. SCDs to BLE. Incision to low back and L flank with coverlet drsg C/D/I. Tolerating general diet. Last BM 10/30/21. Miralax given this AM. Voiding without difficulty. Pain controlled with PO medications.  Pt up with min/

## 2021-10-31 NOTE — PLAN OF CARE
A&Ox4. VSS. On room air. . Last BM 10/30. Voiding freely. PO medication for pain control. Coverlet to back C/D/I. Ambulating with min assist. Patient updated on plan of care. Safety precautions in place. Call light within reach.  Will continue to monitor

## 2021-11-01 PROCEDURE — 97110 THERAPEUTIC EXERCISES: CPT

## 2021-11-01 PROCEDURE — 97116 GAIT TRAINING THERAPY: CPT

## 2021-11-01 PROCEDURE — 97535 SELF CARE MNGMENT TRAINING: CPT

## 2021-11-01 NOTE — PROGRESS NOTES
[facility] Hospitalist Progress Note                                                                   [address]  1/20/1960    CC: FU post op    Interval History:  - feeling okay, no new Diagnoses     Lumbar spondylolisthesis with radiculopathy   RA   HTN     Recommendations     Lumbar spondylolisthesis with radiculopathy   - s/p L4-L5 fusion 10/26  - PT/OT, bowel regimen     RA   - upadacitinib as outpt     HTN  -  Decreased Norvasc to 2.

## 2021-11-01 NOTE — PROGRESS NOTES
Spine Service Progress Note    24hrs/Events:   none    Subjective:   Pain improving. Strength improving.  Waiting for GARY    Objective:   /68 (BP Location: Right arm)   Pulse 79   Temp 98 °F (36.7 °C) (Oral)   Resp 20   Ht 5' 2\" (1.575 m)   Wt 12

## 2021-11-01 NOTE — CM/SW NOTE
If the patient is not approved for [facility], her choice is [facility]. Reserved in [address] but asked not to pursue auth until an official denial from [address].     Shanique Betancur, IRAM

## 2021-11-01 NOTE — PHYSICAL THERAPY NOTE
PHYSICAL THERAPY TREATMENT NOTE - INPATIENT    Room Number: 357/357-A     Session: 3   Number of Visits to Meet Established Goals: 5    Presenting Problem: s/p L4-L5 FUSION    History related to current admission: Pt was admitted from home on 10/26/2021 w person does the patient currently need. ..   -   Moving to and from a bed to a chair (including a wheelchair)?: A Little   -   Need to walk in hospital room?: A Little   -   Climbing 3-5 steps with a railing?: A Lot       AM-PAC Score:  Raw Score: 17   Appr ambulation distance and increase number of exercises this session. Continues with decreased balance, strength, and endurance.  Pt would benefit from continued PT.     DISCHARGE RECOMMENDATIONS  PT Discharge Recommendations: Acute rehabilitation     PLAN  PT

## 2021-11-01 NOTE — CM/SW NOTE
MSW received notification that the peer to peer was denied for KELVIN. MSW informed the patient and requested [facility] pursue [address] [facility]. Will await authorization for dc.     Michelle Le LCSW

## 2021-11-01 NOTE — OCCUPATIONAL THERAPY NOTE
OCCUPATIONAL THERAPY TREATMENT NOTE - INPATIENT     Room Number: 357/357-A  Session: 3   Number of Visits to Meet Established Goals: 5    Presenting Problem: s/p L4-5 PXLIF 10/26/21    History related to current admission: Patient is 57year old female adm None  -   Eating meals?: None    AM-PAC Score:  Score: 17  Approx Degree of Impairment: 50.11%  Standardized Score (AM-PAC Scale): 37.26  CMS Modifier (G-Code): CK    FUNCTIONAL TRANSFER ASSESSMENT  Supine to Sit : Not tested  Sit to Stand: Contact guard a aid;Long-handled shoehorn;Long-handled sponge    PLAN  OT Treatment Plan: Balance activities; ADL training;Functional transfer training; Endurance training;Patient/Family education;Patient/Family training; Compensatory technique education  Rehab Potential : G

## 2021-11-01 NOTE — PLAN OF CARE
Patient alert and oriented . Vital signs stable . Pain is controlled with oral pain meds . Dressing to left flank and back clean and dry . Encouraged use of incentive spirometer and ankle pumps .  Voiding ,use depends during the day and pure wick at night p

## 2021-11-01 NOTE — CM/SW NOTE
Peer to Peer scheduled for 4pm today for [facility] to speak with the insurance company. Patient is aware. Still awaiting auth for [facility]. If denied, will discuss VALENTE. Referrals for VALENTE pending.     Aurora Ramos LCSW

## 2021-11-02 VITALS
HEIGHT: 62 IN | RESPIRATION RATE: 20 BRPM | SYSTOLIC BLOOD PRESSURE: 106 MMHG | DIASTOLIC BLOOD PRESSURE: 58 MMHG | HEART RATE: 66 BPM | BODY MASS INDEX: 23.69 KG/M2 | TEMPERATURE: 98 F | OXYGEN SATURATION: 96 % | WEIGHT: 128.75 LBS

## 2021-11-02 PROCEDURE — 97530 THERAPEUTIC ACTIVITIES: CPT

## 2021-11-02 PROCEDURE — 97116 GAIT TRAINING THERAPY: CPT

## 2021-11-02 RX ORDER — GABAPENTIN 100 MG/1
200 CAPSULE ORAL 3 TIMES DAILY
Qty: 90 CAPSULE | Refills: 0 | Status: SHIPPED | OUTPATIENT
Start: 2021-11-02 | End: 2022-01-02

## 2021-11-02 RX ORDER — AMLODIPINE BESYLATE 2.5 MG/1
2.5 TABLET ORAL DAILY
Qty: 30 TABLET | Refills: 0 | Status: SHIPPED | OUTPATIENT
Start: 2021-11-03 | End: 2022-01-26

## 2021-11-02 NOTE — CM/SW NOTE
Insurance auth pending for [employer]. Updates and therapy notes sent to [facility] via [address]. Spoke with Michael Babb from [facility] who requested documentation of appropriate diagnoses for trazadone and valium.   Also they are unable to provide pt's RA med, rinvoq

## 2021-11-02 NOTE — PLAN OF CARE
Pt a&O. On room air. Encouraged use of incentive spirometer and ankle pump exercises every hour while awake. Teds/Scds to BLE. Tolerating diet with good appetite. Had BM today. Voiding w/o difficulty. Pain managed with oral medications.  Pt reminded to [place]

## 2021-11-02 NOTE — CM/SW NOTE
Spoke with Barbara Zuñiga from [facility] who confirmed they have received insurance auth and can accept pt for admission today. They are requesting rapid COVID testing prior to DC. Updated pt's RN who will complete COVID test prior to DC.   Met with pt who expr

## 2021-11-02 NOTE — PLAN OF CARE
Pt a&O. On room air. Encouraged use of incentive spirometer and ankle pump exercises every hour while awake. Teds/Scds to BLE. Tolerating diet with good appetite. Last BM 11/1/21. Voiding w/o difficulty. Pain managed with oral medications.  Pt reminded to \

## 2021-11-02 NOTE — PROGRESS NOTES
[facility] Hospitalist Progress Note                                                                   [address]  1/20/1960    CC: FU post op    Interval History:  - doing okay, no sob/d Diagnoses     Lumbar spondylolisthesis with radiculopathy   RA   HTN     Recommendations     Lumbar spondylolisthesis with radiculopathy   - s/p L4-L5 fusion 10/26  - PT/OT, bowel regimen     RA   - upadacitinib as outpt     HTN  -  Decreased Norvasc to 2.

## 2021-11-02 NOTE — DIETARY NOTE
[address] [place] [address] admitted on 10/26 presents with low back pain. Pt had surgery on 10/26 - L4-5 extreme lateral interbody fusion with posterior screws L sided.      PMH: Back problem, Benign essential hypertension

## 2021-11-02 NOTE — PHYSICAL THERAPY NOTE
PHYSICAL THERAPY TREATMENT NOTE - INPATIENT    Room Number: 357/357-A     Session: 4   Number of Visits to Meet Established Goals: 5    Presenting Problem: s/p L4-L5 FUSION    History related to current admission: Pt was admitted from home on 10/26/2021 w another person does the patient currently need. ..    Help from Another: Moving to and from a bed to a chair (including a wheelchair)?: A Little   Help from Another: Need to walk in hospital room?: A Little   Help from Another: Climbing 3-5 steps with a rail addressed;SCDs in place; Alarm set    ASSESSMENT   Pt tolerated activity well.  Pt completed several bouts of short distance ambulation to address endurance, and was able to increase overall distance compared to previous sessions but reports increased BLE mu

## 2021-11-02 NOTE — PLAN OF CARE
Pt A&Ox4, VSS on room air- IS encouraged. Teds/SCDs to BLE. Incisions to lower back and lateral back dressed w/ coverlet C/D/I. Pain controlled w/ PO meds. Voiding w/o difficulty- uses Purewick at night; last BM 11/1/21.  Awaiting insurance auth for Provide

## 2021-11-03 NOTE — DISCHARGE SUMMARY
[facility]      Discharge Summary    Kaylin Baxter Patient Status:  Inpatient    1960 MRN [id number]   [facility] 3SW-A Attending No att. providers found   Hosp Day # 7 PCP Rocio Guerrero MD     Date of Admission: instructions were given and they were asked to follow up in clinic in 2 weeks.      Disposition: SNF      Discharge Medications: Discharge Medication List as of 11/2/2021  5:37 PM    START taking these medications    docusate sodium 100 MG Oral Cap  Take 10

## 2021-11-09 ENCOUNTER — SNF ADMIT/H&P (OUTPATIENT)
Dept: INTERNAL MEDICINE CLINIC | Facility: SKILLED NURSING FACILITY | Age: 61
End: 2021-11-09

## 2021-11-09 DIAGNOSIS — Z51.89 ENCOUNTER FOR MEDICATION ADJUSTMENT: ICD-10-CM

## 2021-11-09 DIAGNOSIS — Z71.2 ENCOUNTER TO DISCUSS TEST RESULTS: ICD-10-CM

## 2021-11-09 DIAGNOSIS — E87.1 HYPONATREMIA: ICD-10-CM

## 2021-11-09 DIAGNOSIS — Z71.89 ENCOUNTER FOR MEDICATION REVIEW AND COUNSELING: ICD-10-CM

## 2021-11-09 DIAGNOSIS — Z09 FOLLOW UP: ICD-10-CM

## 2021-11-09 PROCEDURE — 99310 SBSQ NF CARE HIGH MDM 45: CPT | Performed by: NURSE PRACTITIONER

## 2021-11-09 RX ORDER — GABAPENTIN 400 MG/1
400 CAPSULE ORAL NIGHTLY
COMMUNITY
End: 2022-01-02

## 2021-11-09 RX ORDER — HYDROCODONE BITARTRATE AND ACETAMINOPHEN 5; 325 MG/1; MG/1
1 TABLET ORAL EVERY 6 HOURS PRN
COMMUNITY
End: 2022-01-26

## 2021-11-09 NOTE — PROGRESS NOTES
Tiffanie Perez  : 1960  Age 57year old  female patient is admitted to [facility] for VALENTE. Reason For Visit:Initial Assessment/Follow up S/P L4-5 fusion and hyponatremia.   [place] diabetes   • Psychiatric Sister         anxiety   • Genito-Urinary Disorder Father         BPH   • Cancer Father         basal cell carcinoma   • Heart Disorder Father         a fib    • Thyroid Disorder Mother         goiter, thyroid surgery, now on Synth no signs of infection noted. Skin/wound: Warm and moist. Lower back surgical incision-slightly tender,dressing CDI and no signs of infection noted. Refer to wound assessment notes. CATHETER/DRAINS/TUBES/LINES: None.   Neuro: AOx3, no focal deficit, follows

## 2022-01-26 PROBLEM — R20.2 NUMBNESS AND TINGLING OF BOTH FEET: Status: ACTIVE | Noted: 2022-01-26

## 2022-01-26 PROBLEM — R20.2 NUMBNESS AND TINGLING IN BOTH HANDS: Status: ACTIVE | Noted: 2022-01-26

## 2022-01-26 PROBLEM — R20.0 NUMBNESS AND TINGLING IN BOTH HANDS: Status: ACTIVE | Noted: 2022-01-26

## 2022-01-26 PROBLEM — R20.0 NUMBNESS AND TINGLING OF BOTH FEET: Status: ACTIVE | Noted: 2022-01-26

## 2022-01-26 PROBLEM — Z91.81 RISK FOR FALLS: Status: ACTIVE | Noted: 2022-01-26

## 2022-02-27 ENCOUNTER — LAB ENCOUNTER (OUTPATIENT)
Dept: LAB | Facility: HOSPITAL | Age: 62
End: 2022-02-27
Attending: ORTHOPAEDIC SURGERY
Payer: COMMERCIAL

## 2022-02-27 DIAGNOSIS — G95.9 CERVICAL MYELOPATHY (HCC): ICD-10-CM

## 2022-02-27 LAB
ANTIBODY SCREEN: NEGATIVE
RH BLOOD TYPE: NEGATIVE

## 2022-02-27 PROCEDURE — 86901 BLOOD TYPING SEROLOGIC RH(D): CPT

## 2022-02-27 PROCEDURE — 86850 RBC ANTIBODY SCREEN: CPT

## 2022-02-27 PROCEDURE — 86900 BLOOD TYPING SEROLOGIC ABO: CPT

## 2022-02-28 ENCOUNTER — ANESTHESIA EVENT (OUTPATIENT)
Dept: SURGERY | Facility: HOSPITAL | Age: 62
DRG: 454 | End: 2022-02-28
Payer: COMMERCIAL

## 2022-03-01 LAB — SARS-COV-2 RNA RESP QL NAA+PROBE: NOT DETECTED

## 2022-03-02 ENCOUNTER — HOSPITAL ENCOUNTER (INPATIENT)
Facility: HOSPITAL | Age: 62
LOS: 5 days | Discharge: NURSING, REHAB OR LONG-TERM CARE | DRG: 454 | End: 2022-03-07
Attending: ORTHOPAEDIC SURGERY | Admitting: ORTHOPAEDIC SURGERY
Payer: COMMERCIAL

## 2022-03-02 ENCOUNTER — ANESTHESIA (OUTPATIENT)
Dept: SURGERY | Facility: HOSPITAL | Age: 62
DRG: 454 | End: 2022-03-02
Payer: COMMERCIAL

## 2022-03-02 ENCOUNTER — APPOINTMENT (OUTPATIENT)
Dept: GENERAL RADIOLOGY | Facility: HOSPITAL | Age: 62
DRG: 454 | End: 2022-03-02
Attending: ORTHOPAEDIC SURGERY
Payer: COMMERCIAL

## 2022-03-02 DIAGNOSIS — G95.9 CERVICAL MYELOPATHY (HCC): Primary | ICD-10-CM

## 2022-03-02 PROCEDURE — 95940 IONM IN OPERATNG ROOM 15 MIN: CPT | Performed by: ORTHOPAEDIC SURGERY

## 2022-03-02 PROCEDURE — 95870 NDL EMG LMTD STD MUSC 1 XTR: CPT | Performed by: ORTHOPAEDIC SURGERY

## 2022-03-02 PROCEDURE — BR141ZZ FLUOROSCOPY OF CERVICAL FACET JOINT(S) USING LOW OSMOLAR CONTRAST: ICD-10-PCS | Performed by: ORTHOPAEDIC SURGERY

## 2022-03-02 PROCEDURE — 95939 C MOTOR EVOKED UPR&LWR LIMBS: CPT | Performed by: ORTHOPAEDIC SURGERY

## 2022-03-02 PROCEDURE — 0RG10A0 FUSION OF CERVICAL VERTEBRAL JOINT WITH INTERBODY FUSION DEVICE, ANTERIOR APPROACH, ANTERIOR COLUMN, OPEN APPROACH: ICD-10-PCS | Performed by: ORTHOPAEDIC SURGERY

## 2022-03-02 PROCEDURE — 76000 FLUOROSCOPY <1 HR PHYS/QHP: CPT | Performed by: ORTHOPAEDIC SURGERY

## 2022-03-02 PROCEDURE — 0RG1071 FUSION OF CERVICAL VERTEBRAL JOINT WITH AUTOLOGOUS TISSUE SUBSTITUTE, POSTERIOR APPROACH, POSTERIOR COLUMN, OPEN APPROACH: ICD-10-PCS | Performed by: ORTHOPAEDIC SURGERY

## 2022-03-02 PROCEDURE — 4A11X4G MONITORING OF PERIPHERAL NERVOUS ELECTRICAL ACTIVITY, INTRAOPERATIVE, EXTERNAL APPROACH: ICD-10-PCS | Performed by: ORTHOPAEDIC SURGERY

## 2022-03-02 PROCEDURE — 0RT30ZZ RESECTION OF CERVICAL VERTEBRAL DISC, OPEN APPROACH: ICD-10-PCS | Performed by: ORTHOPAEDIC SURGERY

## 2022-03-02 PROCEDURE — 95938 SOMATOSENSORY TESTING: CPT | Performed by: ORTHOPAEDIC SURGERY

## 2022-03-02 DEVICE — IMPLANTABLE DEVICE: Type: IMPLANTABLE DEVICE | Site: SPINE CERVICAL | Status: FUNCTIONAL

## 2022-03-02 DEVICE — MODULUS CERVICAL, 8X19X16MM 10°
Type: IMPLANTABLE DEVICE | Site: SPINE CERVICAL | Status: FUNCTIONAL
Brand: MODULUS

## 2022-03-02 DEVICE — SCREW 4X15 SELF TAP VAR: Type: IMPLANTABLE DEVICE | Site: SPINE CERVICAL | Status: FUNCTIONAL

## 2022-03-02 DEVICE — I-FACTOR PUTTY, 1.0CC SYRINGE
Type: IMPLANTABLE DEVICE | Site: SPINE CERVICAL | Status: FUNCTIONAL
Brand: I-FACTOR PEPTIDE ENHANCED BONE GRAFT

## 2022-03-02 RX ORDER — MORPHINE SULFATE 4 MG/ML
4 INJECTION, SOLUTION INTRAMUSCULAR; INTRAVENOUS EVERY 2 HOUR PRN
Status: DISCONTINUED | OUTPATIENT
Start: 2022-03-02 | End: 2022-03-07

## 2022-03-02 RX ORDER — SODIUM CHLORIDE, SODIUM LACTATE, POTASSIUM CHLORIDE, CALCIUM CHLORIDE 600; 310; 30; 20 MG/100ML; MG/100ML; MG/100ML; MG/100ML
INJECTION, SOLUTION INTRAVENOUS CONTINUOUS
Status: DISCONTINUED | OUTPATIENT
Start: 2022-03-02 | End: 2022-03-07

## 2022-03-02 RX ORDER — ZOLPIDEM TARTRATE 5 MG/1
5 TABLET ORAL NIGHTLY PRN
Status: DISCONTINUED | OUTPATIENT
Start: 2022-03-02 | End: 2022-03-07

## 2022-03-02 RX ORDER — PROCHLORPERAZINE EDISYLATE 5 MG/ML
10 INJECTION INTRAMUSCULAR; INTRAVENOUS EVERY 6 HOURS PRN
Status: ACTIVE | OUTPATIENT
Start: 2022-03-02 | End: 2022-03-04

## 2022-03-02 RX ORDER — BISACODYL 10 MG
10 SUPPOSITORY, RECTAL RECTAL
Status: DISCONTINUED | OUTPATIENT
Start: 2022-03-02 | End: 2022-03-07

## 2022-03-02 RX ORDER — MEPERIDINE HYDROCHLORIDE 25 MG/ML
12.5 INJECTION INTRAMUSCULAR; INTRAVENOUS; SUBCUTANEOUS AS NEEDED
Status: DISCONTINUED | OUTPATIENT
Start: 2022-03-02 | End: 2022-03-02 | Stop reason: HOSPADM

## 2022-03-02 RX ORDER — LABETALOL HYDROCHLORIDE 5 MG/ML
5 INJECTION, SOLUTION INTRAVENOUS EVERY 5 MIN PRN
Status: DISCONTINUED | OUTPATIENT
Start: 2022-03-02 | End: 2022-03-02 | Stop reason: HOSPADM

## 2022-03-02 RX ORDER — SODIUM PHOSPHATE, DIBASIC AND SODIUM PHOSPHATE, MONOBASIC 7; 19 G/133ML; G/133ML
1 ENEMA RECTAL ONCE AS NEEDED
Status: DISCONTINUED | OUTPATIENT
Start: 2022-03-02 | End: 2022-03-07

## 2022-03-02 RX ORDER — POLYETHYLENE GLYCOL 3350 17 G/17G
17 POWDER, FOR SOLUTION ORAL DAILY PRN
Status: DISCONTINUED | OUTPATIENT
Start: 2022-03-02 | End: 2022-03-07

## 2022-03-02 RX ORDER — METHYLPREDNISOLONE ACETATE 40 MG/ML
INJECTION, SUSPENSION INTRA-ARTICULAR; INTRALESIONAL; INTRAMUSCULAR; SOFT TISSUE AS NEEDED
Status: DISCONTINUED | OUTPATIENT
Start: 2022-03-02 | End: 2022-03-02 | Stop reason: HOSPADM

## 2022-03-02 RX ORDER — HYDROCODONE BITARTRATE AND ACETAMINOPHEN 5; 325 MG/1; MG/1
2 TABLET ORAL AS NEEDED
Status: DISCONTINUED | OUTPATIENT
Start: 2022-03-02 | End: 2022-03-02 | Stop reason: HOSPADM

## 2022-03-02 RX ORDER — HYDROCODONE BITARTRATE AND ACETAMINOPHEN 5; 325 MG/1; MG/1
2 TABLET ORAL EVERY 4 HOURS PRN
Status: DISCONTINUED | OUTPATIENT
Start: 2022-03-02 | End: 2022-03-07

## 2022-03-02 RX ORDER — DOCUSATE SODIUM 100 MG/1
100 CAPSULE, LIQUID FILLED ORAL 2 TIMES DAILY
Status: DISCONTINUED | OUTPATIENT
Start: 2022-03-02 | End: 2022-03-07

## 2022-03-02 RX ORDER — DEXAMETHASONE SODIUM PHOSPHATE 4 MG/ML
4 VIAL (ML) INJECTION AS NEEDED
Status: DISCONTINUED | OUTPATIENT
Start: 2022-03-02 | End: 2022-03-02 | Stop reason: HOSPADM

## 2022-03-02 RX ORDER — NEOSTIGMINE METHYLSULFATE 1 MG/ML
INJECTION INTRAVENOUS AS NEEDED
Status: DISCONTINUED | OUTPATIENT
Start: 2022-03-02 | End: 2022-03-02 | Stop reason: SURG

## 2022-03-02 RX ORDER — HYDROCODONE BITARTRATE AND ACETAMINOPHEN 5; 325 MG/1; MG/1
1 TABLET ORAL EVERY 6 HOURS PRN
Qty: 40 TABLET | Refills: 0 | Status: SHIPPED | OUTPATIENT
Start: 2022-03-02

## 2022-03-02 RX ORDER — ONDANSETRON 2 MG/ML
INJECTION INTRAMUSCULAR; INTRAVENOUS AS NEEDED
Status: DISCONTINUED | OUTPATIENT
Start: 2022-03-02 | End: 2022-03-02 | Stop reason: SURG

## 2022-03-02 RX ORDER — SCOLOPAMINE TRANSDERMAL SYSTEM 1 MG/1
PATCH, EXTENDED RELEASE TRANSDERMAL AS NEEDED
Status: DISCONTINUED | OUTPATIENT
Start: 2022-03-02 | End: 2022-03-02 | Stop reason: SURG

## 2022-03-02 RX ORDER — DIAZEPAM 5 MG/ML
INJECTION, SOLUTION INTRAMUSCULAR; INTRAVENOUS
Status: COMPLETED
Start: 2022-03-02 | End: 2022-03-02

## 2022-03-02 RX ORDER — HYDROCODONE BITARTRATE AND ACETAMINOPHEN 5; 325 MG/1; MG/1
1 TABLET ORAL EVERY 4 HOURS PRN
Status: DISCONTINUED | OUTPATIENT
Start: 2022-03-02 | End: 2022-03-07

## 2022-03-02 RX ORDER — SENNOSIDES 8.6 MG
17.2 TABLET ORAL NIGHTLY
Status: DISCONTINUED | OUTPATIENT
Start: 2022-03-02 | End: 2022-03-07

## 2022-03-02 RX ORDER — DIPHENHYDRAMINE HYDROCHLORIDE 50 MG/ML
25 INJECTION INTRAMUSCULAR; INTRAVENOUS EVERY 4 HOURS PRN
Status: DISCONTINUED | OUTPATIENT
Start: 2022-03-02 | End: 2022-03-07

## 2022-03-02 RX ORDER — ACETAMINOPHEN 500 MG
1000 TABLET ORAL ONCE
Status: DISCONTINUED | OUTPATIENT
Start: 2022-03-02 | End: 2022-03-02 | Stop reason: HOSPADM

## 2022-03-02 RX ORDER — ROCURONIUM BROMIDE 10 MG/ML
INJECTION, SOLUTION INTRAVENOUS AS NEEDED
Status: DISCONTINUED | OUTPATIENT
Start: 2022-03-02 | End: 2022-03-02 | Stop reason: SURG

## 2022-03-02 RX ORDER — DIPHENHYDRAMINE HCL 25 MG
25 CAPSULE ORAL EVERY 4 HOURS PRN
Status: DISCONTINUED | OUTPATIENT
Start: 2022-03-02 | End: 2022-03-07

## 2022-03-02 RX ORDER — ONDANSETRON 2 MG/ML
4 INJECTION INTRAMUSCULAR; INTRAVENOUS AS NEEDED
Status: DISCONTINUED | OUTPATIENT
Start: 2022-03-02 | End: 2022-03-02 | Stop reason: HOSPADM

## 2022-03-02 RX ORDER — ACETAMINOPHEN 500 MG
1000 TABLET ORAL ONCE AS NEEDED
Status: DISCONTINUED | OUTPATIENT
Start: 2022-03-02 | End: 2022-03-02 | Stop reason: HOSPADM

## 2022-03-02 RX ORDER — ONDANSETRON 2 MG/ML
4 INJECTION INTRAMUSCULAR; INTRAVENOUS EVERY 4 HOURS PRN
Status: ACTIVE | OUTPATIENT
Start: 2022-03-02 | End: 2022-03-03

## 2022-03-02 RX ORDER — TIZANIDINE 2 MG/1
2 TABLET ORAL EVERY 8 HOURS PRN
Status: DISCONTINUED | OUTPATIENT
Start: 2022-03-02 | End: 2022-03-07

## 2022-03-02 RX ORDER — VANCOMYCIN HYDROCHLORIDE 1 G/20ML
INJECTION, POWDER, LYOPHILIZED, FOR SOLUTION INTRAVENOUS AS NEEDED
Status: DISCONTINUED | OUTPATIENT
Start: 2022-03-02 | End: 2022-03-02 | Stop reason: HOSPADM

## 2022-03-02 RX ORDER — DEXAMETHASONE SODIUM PHOSPHATE 4 MG/ML
VIAL (ML) INJECTION AS NEEDED
Status: DISCONTINUED | OUTPATIENT
Start: 2022-03-02 | End: 2022-03-02 | Stop reason: SURG

## 2022-03-02 RX ORDER — HYDROMORPHONE HYDROCHLORIDE 1 MG/ML
0.4 INJECTION, SOLUTION INTRAMUSCULAR; INTRAVENOUS; SUBCUTANEOUS EVERY 5 MIN PRN
Status: DISCONTINUED | OUTPATIENT
Start: 2022-03-02 | End: 2022-03-02 | Stop reason: HOSPADM

## 2022-03-02 RX ORDER — METOCLOPRAMIDE HYDROCHLORIDE 5 MG/ML
INJECTION INTRAMUSCULAR; INTRAVENOUS AS NEEDED
Status: DISCONTINUED | OUTPATIENT
Start: 2022-03-02 | End: 2022-03-02 | Stop reason: SURG

## 2022-03-02 RX ORDER — GABAPENTIN 300 MG/1
300 CAPSULE ORAL 3 TIMES DAILY
Status: DISCONTINUED | OUTPATIENT
Start: 2022-03-02 | End: 2022-03-07

## 2022-03-02 RX ORDER — MIDAZOLAM HYDROCHLORIDE 1 MG/ML
1 INJECTION INTRAMUSCULAR; INTRAVENOUS EVERY 5 MIN PRN
Status: DISCONTINUED | OUTPATIENT
Start: 2022-03-02 | End: 2022-03-02 | Stop reason: HOSPADM

## 2022-03-02 RX ORDER — HYDROCODONE BITARTRATE AND ACETAMINOPHEN 5; 325 MG/1; MG/1
1 TABLET ORAL AS NEEDED
Status: DISCONTINUED | OUTPATIENT
Start: 2022-03-02 | End: 2022-03-02 | Stop reason: HOSPADM

## 2022-03-02 RX ORDER — DIAZEPAM 5 MG/ML
5 INJECTION, SOLUTION INTRAMUSCULAR; INTRAVENOUS AS NEEDED
Status: DISCONTINUED | OUTPATIENT
Start: 2022-03-02 | End: 2022-03-02 | Stop reason: HOSPADM

## 2022-03-02 RX ORDER — MORPHINE SULFATE 2 MG/ML
1 INJECTION, SOLUTION INTRAMUSCULAR; INTRAVENOUS EVERY 2 HOUR PRN
Status: DISCONTINUED | OUTPATIENT
Start: 2022-03-02 | End: 2022-03-07

## 2022-03-02 RX ORDER — CEFAZOLIN SODIUM/WATER 2 G/20 ML
2 SYRINGE (ML) INTRAVENOUS ONCE
Status: COMPLETED | OUTPATIENT
Start: 2022-03-02 | End: 2022-03-02

## 2022-03-02 RX ORDER — NALOXONE HYDROCHLORIDE 0.4 MG/ML
80 INJECTION, SOLUTION INTRAMUSCULAR; INTRAVENOUS; SUBCUTANEOUS AS NEEDED
Status: DISCONTINUED | OUTPATIENT
Start: 2022-03-02 | End: 2022-03-02 | Stop reason: HOSPADM

## 2022-03-02 RX ORDER — CEFAZOLIN SODIUM/WATER 2 G/20 ML
2 SYRINGE (ML) INTRAVENOUS EVERY 8 HOURS
Status: COMPLETED | OUTPATIENT
Start: 2022-03-02 | End: 2022-03-02

## 2022-03-02 RX ORDER — GLYCOPYRROLATE 0.2 MG/ML
INJECTION, SOLUTION INTRAMUSCULAR; INTRAVENOUS AS NEEDED
Status: DISCONTINUED | OUTPATIENT
Start: 2022-03-02 | End: 2022-03-02 | Stop reason: SURG

## 2022-03-02 RX ORDER — PHENYLEPHRINE HCL 10 MG/ML
VIAL (ML) INJECTION AS NEEDED
Status: DISCONTINUED | OUTPATIENT
Start: 2022-03-02 | End: 2022-03-02 | Stop reason: SURG

## 2022-03-02 RX ORDER — LIDOCAINE HYDROCHLORIDE 10 MG/ML
INJECTION, SOLUTION EPIDURAL; INFILTRATION; INTRACAUDAL; PERINEURAL AS NEEDED
Status: DISCONTINUED | OUTPATIENT
Start: 2022-03-02 | End: 2022-03-02 | Stop reason: SURG

## 2022-03-02 RX ORDER — ACETAMINOPHEN 325 MG/1
650 TABLET ORAL EVERY 4 HOURS PRN
Status: DISCONTINUED | OUTPATIENT
Start: 2022-03-02 | End: 2022-03-07

## 2022-03-02 RX ORDER — SODIUM CHLORIDE, SODIUM LACTATE, POTASSIUM CHLORIDE, CALCIUM CHLORIDE 600; 310; 30; 20 MG/100ML; MG/100ML; MG/100ML; MG/100ML
INJECTION, SOLUTION INTRAVENOUS CONTINUOUS
Status: DISCONTINUED | OUTPATIENT
Start: 2022-03-02 | End: 2022-03-02 | Stop reason: HOSPADM

## 2022-03-02 RX ORDER — MIDAZOLAM HYDROCHLORIDE 1 MG/ML
INJECTION INTRAMUSCULAR; INTRAVENOUS AS NEEDED
Status: DISCONTINUED | OUTPATIENT
Start: 2022-03-02 | End: 2022-03-02 | Stop reason: SURG

## 2022-03-02 RX ORDER — MORPHINE SULFATE 2 MG/ML
2 INJECTION, SOLUTION INTRAMUSCULAR; INTRAVENOUS EVERY 2 HOUR PRN
Status: DISCONTINUED | OUTPATIENT
Start: 2022-03-02 | End: 2022-03-07

## 2022-03-02 RX ORDER — METOCLOPRAMIDE HYDROCHLORIDE 5 MG/ML
10 INJECTION INTRAMUSCULAR; INTRAVENOUS AS NEEDED
Status: DISCONTINUED | OUTPATIENT
Start: 2022-03-02 | End: 2022-03-02 | Stop reason: HOSPADM

## 2022-03-02 RX ADMIN — SODIUM CHLORIDE, SODIUM LACTATE, POTASSIUM CHLORIDE, CALCIUM CHLORIDE: 600; 310; 30; 20 INJECTION, SOLUTION INTRAVENOUS at 09:58:00

## 2022-03-02 RX ADMIN — LIDOCAINE HYDROCHLORIDE 50 MG: 10 INJECTION, SOLUTION EPIDURAL; INFILTRATION; INTRACAUDAL; PERINEURAL at 07:51:00

## 2022-03-02 RX ADMIN — NEOSTIGMINE METHYLSULFATE 2 MG: 1 INJECTION INTRAVENOUS at 09:46:00

## 2022-03-02 RX ADMIN — PHENYLEPHRINE HCL 100 MCG: 10 MG/ML VIAL (ML) INJECTION at 08:33:00

## 2022-03-02 RX ADMIN — PHENYLEPHRINE HCL 100 MCG: 10 MG/ML VIAL (ML) INJECTION at 09:02:00

## 2022-03-02 RX ADMIN — CEFAZOLIN SODIUM/WATER 2 G: 2 G/20 ML SYRINGE (ML) INTRAVENOUS at 07:59:00

## 2022-03-02 RX ADMIN — DEXAMETHASONE SODIUM PHOSPHATE 8 MG: 4 MG/ML VIAL (ML) INJECTION at 08:07:00

## 2022-03-02 RX ADMIN — PHENYLEPHRINE HCL 100 MCG: 10 MG/ML VIAL (ML) INJECTION at 08:17:00

## 2022-03-02 RX ADMIN — MIDAZOLAM HYDROCHLORIDE 3 MG: 1 INJECTION INTRAMUSCULAR; INTRAVENOUS at 07:37:00

## 2022-03-02 RX ADMIN — GLYCOPYRROLATE 0.2 MG: 0.2 INJECTION, SOLUTION INTRAMUSCULAR; INTRAVENOUS at 09:46:00

## 2022-03-02 RX ADMIN — SCOLOPAMINE TRANSDERMAL SYSTEM 1 PATCH: 1 PATCH, EXTENDED RELEASE TRANSDERMAL at 09:27:00

## 2022-03-02 RX ADMIN — METOCLOPRAMIDE HYDROCHLORIDE 10 MG: 5 INJECTION INTRAMUSCULAR; INTRAVENOUS at 09:27:00

## 2022-03-02 RX ADMIN — SODIUM CHLORIDE, SODIUM LACTATE, POTASSIUM CHLORIDE, CALCIUM CHLORIDE: 600; 310; 30; 20 INJECTION, SOLUTION INTRAVENOUS at 08:19:00

## 2022-03-02 RX ADMIN — ROCURONIUM BROMIDE 10 MG: 10 INJECTION, SOLUTION INTRAVENOUS at 07:51:00

## 2022-03-02 RX ADMIN — PHENYLEPHRINE HCL 100 MCG: 10 MG/ML VIAL (ML) INJECTION at 08:25:00

## 2022-03-02 RX ADMIN — ONDANSETRON 4 MG: 2 INJECTION INTRAMUSCULAR; INTRAVENOUS at 09:27:00

## 2022-03-02 NOTE — ANESTHESIA POSTPROCEDURE EVALUATION
[address] Patient Status:  Inpatient   Age/Gender 60year old female MRN [id number]   [facility] SURGERY Attending Nannette Reyes MD   Hosp Day # 0 PCP Vida Mendez MD       Anesthesia Post-op Note    Cervical 4 - Cervical 5 Anterior Cervical Discectomy Fusion. Procedure Summary     Date: 03/02/22 Room / Location: [facility] MAIN OR 11 / [facility] OR    Anesthesia Start: 5177 Anesthesia Stop: 0843    Procedures:       Cervical 4 - Cervical 5 Anterior Cervical Discectomy Fusion. (N/A Spine Cervical)      INTRAOPERATIVE NEURO MONITORING (N/A ) Diagnosis:       Cervical myelopathy (HCC)      (Cervical myelopathy ([facility] [address] [G95.9])    Surgeons: Nannette Reyes MD Anesthesiologist: Breanna Sherman MD    Anesthesia Type: general ASA Status: 2          Anesthesia Type: general    Vitals Value Taken Time   /83 03/02/22 0958   Temp 98.1 03/02/22 0959   Pulse 80 03/02/22 0959   Resp 18 03/02/22 0959   SpO2 97 % 03/02/22 0959   Vitals shown include unvalidated device data.     Patient Location: PACU    Anesthesia Type: general    Airway Patency: patent    Postop Pain Control: adequate    Mental Status: mildly sedated but able to meaningfully participate in the post-anesthesia evaluation    Nausea/Vomiting: none    Cardiopulmonary/Hydration status: stable euvolemic    Complications: no apparent anesthesia related complications    Postop vital signs: stable    Dental Exam: Unchanged from Preop

## 2022-03-02 NOTE — OPERATIVE REPORT
Operative Note     Patient Name: Nicola Crowder  Date: 3/2/2022  Preoperative Diagnosis: Cervical Myelopathy C4-5, Spondylolisthesis  Postoperative Diagnosis: Same  Primary Surgeon: Mora Lou MD  Assistant: Florentino VIDES  Procedures:   C4-5 Anterior Cervical Discectomy/Fusion CPT 77434  C4-5 Anterior Instrumentation   CPT 67206  Use of Titanium Interbody Graft    CPT 20853 x1  Use of local bone autograft    CPT 29016    Anesthesia: General Endotracheal Anesthesia  Estimated Blood Loss: 25cc  Drains: None  Implants:   Nuvasive C360 Anterior Cervical Plate and Screws 41LO x1 15mm  Nuvasive Modulus interbody cage [id number] x 8height  Bone Graft: ifactor 1cc   Specimen: None  Condition: Stable  Complications: None     Surgical Indications:   Nicola Crowder is an 60year old female who presents with neck issues refractory to nonsurgical care. The option of surgery was discussed with the patient. Risks include, but are not limited to wound complications, bleeding, infection, neurologic injury or onset of a new neurological issue including paralysis, dural tear, pseudoarthrosis (particularly in longer constructs and in smokers/diabetics), fracture, hardware failure, adjacent segment or junctional breakdown, recurrent laryngeal nerve palsy, Marcello's syndrome, dysphagia or dysphonia (These are usually temporary and will resolve within a few weeks to few months timeframe), hematoma requiring evacuation, repeat intubation after surgery, esophageal or tracheal injury, vertebral artery injury (stroke), and the need for possible additional future surgery. We also discussed the possible persistence of symptoms and adjacent segment degeneration. The patient verbalized their understanding and wished to proceed with surgery. Surgical Procedure:   After careful identification patient was taken to the OR, the patient was administered prophylactic antibiotics.   After successful induction of general endotracheal anesthesia, the patient was positioned supine on the operating table with gentle neck extension and securing the arms to the side with gentle traction with shoulder tape. All bony prominences were padded and protected. The neck was then prepped and draped in the usual sterile fashion. A safety timeout was performed identifying the patient by name, MRN, and date of birth; the nature of the procedure, surgical site, and concerns were addressed with the operating room staff. All were in agreement and we proceeded with the procedure. A transverse incision, consistent with the Edwards-Randolph approach, was made. The skin was incised with a scalpel and subcutaneous dissection was carried out with electrocautery. The subplatysmal membrane was then dissected free. The plane between the deep cervical fascia and the carotid sheath was developed bluntly. The carotid was palpated, protected, and retracted laterally. After exposing the prevertebral fascia, miguel clamp was used to identify the disc space. A lateral flouro was then obtained to confirm the appropriate surgical level(s) prior to proceeding with discectomy. The edges of the longus coli muscles elevated from their undersurface with  electrocautery from C4-5. The Trimline retractor of appropriate size was placed. [place] pins were then placed at the most cephalad and caudal vertebral bodies. Mild distraction of the interspace was afforded. A #15 blade was used to create an initial annulotomy. Complete discectomy was complete from uncinate to uncinate with a combination of curettes, pituitary and Kerrison rongeurs. The overhanging osteophyte at the cephalad cervical body was resected and saved for bone grafting. After decompressing the posterior osteophytes and the neuroforamen, the PLL was resected with a #2 Kerrison. A blunt nerve probe can then be passed into the bilateral neural foramen without any resistance.   Next high-speed bur was utilized to decorticate the most lateral aspects of the disc space. Next we trialed and then placed an appropriate sized titanium interbody device packed with local bone graft and allograft into position. Bone graft was then deposited into the disc space on the lateral aspects. At this point the caspar pins were then removed, hemostasis was achieved an appropriate sized plate was selected and applied. Screws were placed and a radiograph obtained to confirm position of the graft and evaluate plate fixation     All bleeders were meticulously controlled using bipolar electrocautery and floseal. The remainder of the floseal was mixed with 40mg of depomedrol and placed over the plate for minimization of dysphagia. 100mg of vancomycin powder was placed in the wound. The wound was thoroughly irrigated at the time of closure. There was no active bleeding. Closure was done in layers with interrupted 2-0 Vicryl for the fascia, 3-0 vicry for subcutaneous incision. The skin was closed with a Monocryl suture. Steri-Strips and a sterile dressing were then applied. The patient was woken from anesthesia and transferred to the PACU in stable condition.                   A physician assistant was necessary during the case to provide positioning, exposure, hemostasis, safety and retraction and to manage wound suction so that I could operate with two hands under the surgical Jarome Denver, MD  Division of [address] [place] [address]

## 2022-03-02 NOTE — BRIEF OP NOTE
Pre-Operative Diagnosis: Cervical myelopathy ([facility]) [G95.9]     Post-Operative Diagnosis: Cervical myelopathy ([address] [G95.9]      Procedure Performed:   Cervical 4 - Cervical 5 Anterior Cervical Discectomy Fusion. Surgeon(s) and Role:     * Gina Moon MD - Primary    Assistant(s):  PA:  Lion Dozier PA-C     Surgical Findings:      Specimen:      Estimated Blood Loss: Blood Output: 25 mL (3/2/2022  9:34 AM)      Dictation Number:      Claritza Alanis PA-C  3/2/2022  9:54 AM

## 2022-03-02 NOTE — ANESTHESIA PROCEDURE NOTES
Airway  Date/Time: 3/2/2022 7:52 AM  Urgency: elective    Airway not difficult    General Information and Staff    Patient location during procedure: OR  Anesthesiologist: Jorge L Cisneros MD  Performed: anesthesiologist     Indications and Patient Condition  Indications for airway management: anesthesia  Sedation level: deep  Preoxygenated: yes  Patient position: sniffing  MILS maintained throughout  Mask difficulty assessment: 0 - not attempted    Final Airway Details  Final airway type: endotracheal airway      Successful airway: ETT  Cuffed: yes   Successful intubation technique: direct laryngoscopy  Endotracheal tube insertion site: oral  Blade: Nicholas  Blade size: #3  ETT size (mm): 7.5    Cormack-Lehane Classification: grade IIA - partial view of glottis  Placement verified by: chest auscultation and capnometry   Cuff volume (mL): 6  Measured from: lips  ETT to lips (cm): 22  Number of attempts at approach: 1

## 2022-03-03 LAB
HCT VFR BLD AUTO: 29.8 %
HGB BLD-MCNC: 9.9 G/DL

## 2022-03-03 PROCEDURE — 97166 OT EVAL MOD COMPLEX 45 MIN: CPT

## 2022-03-03 PROCEDURE — 85014 HEMATOCRIT: CPT | Performed by: PHYSICIAN ASSISTANT

## 2022-03-03 PROCEDURE — 97535 SELF CARE MNGMENT TRAINING: CPT

## 2022-03-03 PROCEDURE — 97530 THERAPEUTIC ACTIVITIES: CPT

## 2022-03-03 PROCEDURE — 85018 HEMOGLOBIN: CPT | Performed by: PHYSICIAN ASSISTANT

## 2022-03-03 PROCEDURE — 97162 PT EVAL MOD COMPLEX 30 MIN: CPT

## 2022-03-03 NOTE — CM/SW NOTE
Noted PMR recommendation for acute rehab. [facility]  [facility] stated insurance Estrellita Mccartney will be requested. / to remain available for support and/or discharge planning.      [facility]  Discharge Planner  [phone]

## 2022-03-04 PROCEDURE — 97530 THERAPEUTIC ACTIVITIES: CPT

## 2022-03-04 PROCEDURE — 97116 GAIT TRAINING THERAPY: CPT

## 2022-03-04 PROCEDURE — 97535 SELF CARE MNGMENT TRAINING: CPT

## 2022-03-04 RX ORDER — HYDROXYZINE HYDROCHLORIDE 25 MG/1
25 TABLET, FILM COATED ORAL 3 TIMES DAILY PRN
Status: DISCONTINUED | OUTPATIENT
Start: 2022-03-04 | End: 2022-03-07

## 2022-03-04 RX ORDER — HYDROXYZINE HYDROCHLORIDE 25 MG/1
25 TABLET, FILM COATED ORAL ONCE
Status: COMPLETED | OUTPATIENT
Start: 2022-03-04 | End: 2022-03-04

## 2022-03-04 RX ORDER — FLUTICASONE PROPIONATE 50 MCG
1 SPRAY, SUSPENSION (ML) NASAL DAILY
Status: DISCONTINUED | OUTPATIENT
Start: 2022-03-04 | End: 2022-03-07

## 2022-03-05 PROCEDURE — 97530 THERAPEUTIC ACTIVITIES: CPT

## 2022-03-05 PROCEDURE — 97535 SELF CARE MNGMENT TRAINING: CPT

## 2022-03-05 NOTE — PROGRESS NOTES
Spoke to patient on the phone. She is doing well. They have obtained proof of insurance and the referral has been sent to [facility]. Discharge pending insurance approval by [facility]. A/P: POD # 3 ACDF C4-5    -Activity: As tolerated. Frequent mobilization. Up to chair when not sleeping or ambulating. Appreciate PT/OT reccomendations. Wear brace when supine.   -Analgesia: Multimodal. Continue current pain management  -Anticoagulation: Moderate risk for DVT/PE. Defer to mechanical DVT ppx given recent history of spine surgery and risk for formation of epidural hematoma. Early mobilation. May resume chemical prophylaxis if needed 5 days post-operative.  Heparin Sq may be discussed 2-3 days after surgery if lack of patient mobilization please discuss with me  - PT/Rehab: Gait Training, Stairs, No bending, lifting, twisting, brace to be worn if provided to patient  - Dispo: Pending insurance auth    Tanja Gill PA-C

## 2022-03-05 NOTE — CM/SW NOTE
CM spoke to [facility] Admissions department, insurance authorization status is pending at this time. [facility] Liaison to follow up with CM if response received today. RN updated.     Satnam Black RN Case Manager [id number]

## 2022-03-06 PROCEDURE — 97116 GAIT TRAINING THERAPY: CPT

## 2022-03-06 NOTE — PROGRESS NOTES
Spoke with patient's RN. She is doing well. She is eating breakfast and watching her [employer] service. Mild pain controlled on PO medications. Working with PT and OT. Awaiting insurance approval for GARY.  Discharge pending insurance approval.

## 2022-03-06 NOTE — PHYSICAL THERAPY NOTE
PHYSICAL THERAPY TREATMENT NOTE - INPATIENT    Room Number: 385/385-A     Session: 2     Number of Visits to Meet Established Goals: 4    Presenting Problem: s/p C4-5 ACDF on 3/2/22  Co-Morbidities : RA, HTN, neuropathy, lumbar spine sx      History related to current admission: Patient is a 60year old female admitted on 3/2/2022 from home for C4-5 ACDF. Pt is s/p L4-5 lami fusion 10/26/21. ASSESSMENT     Pt continues to require min assist for amb with RW due to weakness and decrease balance in standing. Pt presents with decrease balance, decrease neuromuscular strength, abnormal posture, and decrease functional mobility skills. Pt may benefit from continued PT while in inpt setting to address above deficits and facilitate return to highest functional level of independence. DISCHARGE RECOMMENDATIONS  PT Discharge Recommendations: Acute rehabilitation     PLAN  PT Treatment Plan: Bed mobility; Body mechanics; Coordination; Energy conservation;Patient education;Don/doff brace; Endurance; Family education;Gait training;Neuromuscular re-educate;Range of motion;Strengthening;Stair training;Transfer training;Balance training  Rehab Potential : Good  Frequency (Obs): 3-5x/week    CURRENT GOALS     Goal #1 Patient is able to demonstrate supine - sit EOB @ level: modified independent      Goal #2 Patient is able to demonstrate transfers Sit to/from Stand at assistance level: modified independent      Goal #3 Patient is able to ambulate 250 feet with assist device: walker - rolling at assistance level: supervision      Goal #4 Patient is able to stair climb 6 stairs with use of B rails at assistance level: supervision   Goal #5     Goal #6     Goal Comments: Goals established on 3/3/2022         3/6/2022 all goals ongoing      SUBJECTIVE  \"I walked with my daughter today and we did exercises. \"    OBJECTIVE  Precautions: Spine;Cervical brace (soft collar for comfort )    WEIGHT BEARING RESTRICTION  Weight Bearing Restriction: None                PAIN ASSESSMENT   Ratin  Location: R shoulder  Management Techniques: Activity promotion;Relaxation    BALANCE                                                                                                                       Static Sitting: Good  Dynamic Sitting: Fair +           Static Standing: Fair -  Dynamic Standing: Poor +    ACTIVITY TOLERANCE                         O2 WALK         AM-PAC '6-Clicks' INPATIENT SHORT FORM - BASIC MOBILITY  How much difficulty does the patient currently have. .. Patient Difficulty: Turning over in bed (including adjusting bedclothes, sheets and blankets)?: A Little   Patient Difficulty: Sitting down on and standing up from a chair with arms (e.g., wheelchair, bedside commode, etc.): A Little   Patient Difficulty: Moving from lying on back to sitting on the side of the bed?: A Little   How much help from another person does the patient currently need. .. Help from Another: Moving to and from a bed to a chair (including a wheelchair)?: A Little   Help from Another: Need to walk in hospital room?: A Little   Help from Another: Climbing 3-5 steps with a railing?: A Lot       AM-PAC Score:  Raw Score: 17   Approx Degree of Impairment: 50.57%   Standardized Score (AM-PAC Scale): 42.13   CMS Modifier (G-Code): CK    FUNCTIONAL ABILITY STATUS  Gait Assessment   Functional Mobility/Gait Assessment  Gait Assistance: Minimum assistance  Distance (ft): 40, 20, 15  Assistive Device: Rolling walker  Pattern: R Steppage;L Steppage;R Foot flat;L Foot flat; Ataxic    Skilled Therapy Provided    Bed Mobility:  Rolling right: not tested   Rolling left: not tested    Supine<>Sit: not tested   Sit<>Supine: not tested     Transfer Mobility:  Sit<>Stand: CGA   Stand<>Sit: CGA   Gait: pt amb 40, 20, and 15 feet with RW and min assist, close chair follow, and seated rest breaks. Therapist's Comments: per RN pt ok to be seen.   Pt received sitting in [facility] and agreeable to therapy. Pt educated on goals and course of PT session. Rehab Amelia koenig in to assist with chair follow. Pt instructed in relaxing B shoulders while amb, and deep breathing techniques during rest breaks. Pt was wheeled back to room and educated on activity recommendations, ankle pumps for dVT prevention and call don't fall. Pt left in sitting with call light and needs within reach and Rn aware of session. THERAPEUTIC EXERCISES  Lower Extremity Ankle pumps     Upper Extremity Shoulder rolls  Scapular squeeze     Position Sitting     Repetitions   10   Sets   1     Patient End of Session: Up in chair;Needs met;Call light within reach;RN aware of session/findings; All patient questions and concerns addressed    Therapist/Rehab aide PPE: Mask, gloves and goggles were worn. Patient/Family PPE: Mask donned and worn while amb outside pt room.

## 2022-03-07 VITALS
DIASTOLIC BLOOD PRESSURE: 83 MMHG | OXYGEN SATURATION: 94 % | SYSTOLIC BLOOD PRESSURE: 133 MMHG | HEART RATE: 84 BPM | WEIGHT: 135 LBS | RESPIRATION RATE: 17 BRPM | BODY MASS INDEX: 24.84 KG/M2 | TEMPERATURE: 98 F | HEIGHT: 62 IN

## 2022-03-07 LAB — SARS-COV-2 RNA RESP QL NAA+PROBE: NOT DETECTED

## 2022-03-07 PROCEDURE — 97110 THERAPEUTIC EXERCISES: CPT

## 2022-03-07 PROCEDURE — 97116 GAIT TRAINING THERAPY: CPT

## 2022-03-07 NOTE — CM/SW NOTE
Spoke with Erich from [facility] ([phone]) who stated pt's insurance plan is now showing as active and they have submitted request for insurance auth. Met with pt and updated her to same. Updated pt's RN. Await insurance auth for discharge. / to remain available for support and/or discharge planning. [facility]  Discharge Planner  [phone]    Addendum:  Informed by Erich with [facility] that they have received insurance approval and can accept pt for admission today. Pt will go to room 3314. RN to call for report [phone]. Met with pt who is agreeable with DC plan for today. Pt requesting medicar transport and SW informed her of costs not covered by insurance. Medicar transport scheduled for 4pm.  PCS form completed and available for RN to print. Updated pt's RN. RN to completed rapid COVID testing prior to DC per  request.  / to remain available for support and/or discharge planning.      [facility] acute rehab, room [address]  [phone]

## 2022-03-07 NOTE — PHYSICAL THERAPY NOTE
PHYSICAL THERAPY TREATMENT NOTE - INPATIENT    Room Number: 385/385-A     Session: 3    Number of Visits to Meet Established Goals: 4    Presenting Problem: s/p C4-5 ACDF on 3/2/22  Co-Morbidities : RA, HTN, neuropathy, lumbar spine sx      History related to current admission: Patient is a 60year old female admitted on 3/2/2022 from home for C4-5 ACDF. Pt is s/p L4-5 lami fusion 10/26/21. ASSESSMENT   Pt progressing towards goals. Continues to demonstrate B UE and LE weakness with exercises and daily tasks. Pt demonstrates ataxic gait, and able to follow cues and commands for gait training. Postural strength and alignment continue to poor gait mechanics, therefore integrated exercises to address posture. Pt reports continued difficulty with bed mobility. Pt continues to demonstrate strength deficits, gait deficits, decreased balance, and decreased overall functional mobility. Pt would continue to benefit from inpatient skilled PT services to address these deficits. The AM-PAC '6-Clicks' Inpatient Basic Mobility Short Form was completed and this patient is demonstrating a 50.57% degree of impairment in mobility. Research supports that patients with this level of impairment may benefit from DC recommendation to Acute Rehabilitation. DISCHARGE RECOMMENDATIONS  PT Discharge Recommendations: Acute rehabilitation     PLAN  PT Treatment Plan: Bed mobility; Body mechanics; Coordination; Energy conservation;Patient education;Don/doff brace; Endurance; Family education;Gait training;Neuromuscular re-educate;Range of motion;Strengthening;Stair training;Transfer training;Balance training  Rehab Potential : Good  Frequency (Obs): 3-5x/week    CURRENT GOALS     Goal #1 Patient is able to demonstrate supine - sit EOB @ level: modified independent      Goal #2 Patient is able to demonstrate transfers Sit to/from Stand at assistance level: modified independent      Goal #3 Patient is able to ambulate 250 feet with assist device: walker - rolling at assistance level: supervision      Goal #4 Patient is able to stair climb 6 stairs with use of B rails at assistance level: supervision   Goal #5     Goal #6     Goal Comments: Goals established on 3/3/2022         3/7/2022 all goals ongoing      SUBJECTIVE  \"I do feel like I am improving in some areas. \"      OBJECTIVE  Precautions: Spine;Cervical brace (soft collar for comfort )    WEIGHT BEARING RESTRICTION  Weight Bearing Restriction: None                PAIN ASSESSMENT   Ratin  Location: R shoulder  Management Techniques: Activity promotion;Relaxation;Repositioning    BALANCE                                                                                                                       Static Sitting: Good  Dynamic Sitting: Fair +           Static Standing: Poor +  Dynamic Standing: Poor    ACTIVITY TOLERANCE                         O2 WALK         AM-PAC '6-Clicks' INPATIENT SHORT FORM - BASIC MOBILITY  How much difficulty does the patient currently have. .. Patient Difficulty: Turning over in bed (including adjusting bedclothes, sheets and blankets)?: A Little   Patient Difficulty: Sitting down on and standing up from a chair with arms (e.g., wheelchair, bedside commode, etc.): A Little   Patient Difficulty: Moving from lying on back to sitting on the side of the bed?: A Little   How much help from another person does the patient currently need. ..    Help from Another: Moving to and from a bed to a chair (including a wheelchair)?: A Little   Help from Another: Need to walk in hospital room?: A Little   Help from Another: Climbing 3-5 steps with a railing?: A Lot       AM-PAC Score:  Raw Score: 17   Approx Degree of Impairment: 50.57%   Standardized Score (AM-PAC Scale): 42.13   CMS Modifier (G-Code): CK    FUNCTIONAL ABILITY STATUS  Gait Assessment   Functional Mobility/Gait Assessment  Gait Assistance: Minimum assistance  Distance (ft): 100  Assistive Device: Rolling walker  Pattern: R Steppage;L Steppage;R Foot flat;L Foot flat; Ataxic    Skilled Therapy Provided    Bed Mobility:  Rolling right: not tested   Rolling left: not tested    Supine<>Sit: not tested   Sit<>Supine: not tested     Transfer Mobility:  Sit<>Stand: supervision   Stand<>Sit: supervision   Gait: Pt ambulated 50 ft with RW, one standing rest break long term. 5 minutes seated rest break, then returned back to room. Therapist's Comments: chart reviewed and RN approved PT session. Pt sitting up in chair and agreed to PT, vitals WNL. Gait training: pt cued for upright posture, neutral cervical spine positioning, and eye looking up. Pt instructed in wider ROS, and inc step length with ambulation. Instructed in UE and LE exercises as well as daily tasks to address endurance such as folding towels or clothes. Pt left up in chair with all needs in reach. RN notified of session. THERAPEUTIC EXERCISES  Lower Extremity Alternating marching  Hip adduction squeezes  LAQ  Toe raises     Upper Extremity Shoulder rolls  Scapular squeeze  Daily tasks such as brushing hair, folding clothes     Position Sitting     Repetitions   15   Sets   1     Patient End of Session: Up in chair;Needs met;Call light within reach;RN aware of session/findings; All patient questions and concerns addressed;SCDs in place    Therapist/Rehab aide PPE: Mask, gloves and goggles were worn. Patient/Family PPE: Mask donned and worn while amb outside pt room.

## 2022-03-08 NOTE — DISCHARGE SUMMARY
[facility]      Discharge Summary    Kylie On license of [facility] Patient Status:  Inpatient    1960 MRN [id number]   [facility] 3SW-A Attending No att. providers found   Hosp Day # 5 PCP Apolinar Chambers MD     Date of Admission: 3/2/2022    Date of Discharge: 3/7/2022    Admitting Diagnosis: Cervical myelopathy ([address] [G95.9]  Cervical myelopathy ([address]    Discharge Diagnosis: Patient Active Problem List:     Elevated blood pressure reading without diagnosis of hypertension     Pruritus     Irritability     Menopausal disorder     Tobacco abuse     Benign essential hypertension     Elevated liver enzymes     Hyponatremia     Right lumbar radiculopathy     Rotator cuff syndrome, left     Complete rotator cuff tear of left shoulder     Full thickness tear of left subscapularis tendon     Spondylolisthesis of lumbar region     Spinal stenosis of lumbar region with neurogenic claudication     Lumbar radiculopathy     Unstable gait     Foot drop, bilateral     Neuropathy     Numbness and tingling of both feet     Risk for falls     Cervical myelopathy ([address]     Cervical myelopathy [facility])      Hospital course: The patient was admitted on above date to undergo elective surgical intervention understanding risks and benefits to surgery after failing conservative care. Patient underwent   Surgical Procedures     Case IDs Date Procedure Surgeon Location Status    [id number] 3/2/22 Cervical 4 - Cervical 5 Anterior Cervical Discectomy Fusion. Parker Wilhelm MD [address] OR [facility]        Afterward, pt was brought to the PACU in stable condition. After the recovery room the patient was transferred to the floor using standard protocol orders. Once on the floor the patient was followed by spine service and medical services as well as appropriate ancillary consultations throughout the hospital stay. The patient participated in Physical and Occupational Therapy making steady progressive gains.   Once deemed stable by all services the patient was discharged on the above date. Standard discharge instructions were given and they were asked to follow up in clinic in 2 weeks. Disposition: Inpt Physical Rehab Facility or Physical Rehab Unit      Discharge Medications: Discharge Medication List as of 3/7/2022  4:11 PM    START taking these medications    HYDROcodone-acetaminophen (NORCO) 5-325 MG Oral Tab  Take 1 tablet by mouth every 6 (six) hours as needed for Pain., Print Script, Disp-40 tablet, R-0      CONTINUE these medications which have NOT CHANGED    gabapentin 300 MG Oral Cap  Take 1 capsule (300 mg total) by mouth 3 (three) times daily. , Normal, Disp-270 capsule, R-3    Calcium Citrate-Vitamin D (CALCIUM CITRATE + D3 OR)  Take 1,200 mg by mouth daily. , Historical    Upadacitinib ER (RINVOQ) 15 MG Oral Tablet 24 Hr  Take 15 mg by mouth daily. , Normal, Disp-30 tablet, R-3    vitamin E 400 UNITS Oral Cap  Take 400 Units by mouth daily. , Historical    Lidocaine HCl 4 % External Cream  Apply 1 Application topically daily as needed., Historical    BIOTIN OR  Take 1 tablet by mouth daily. , Historical    Ascorbic Acid (VITAMIN C OR)  Take 1 tablet by mouth daily. , Historical    aspirin 81 MG Oral Tab EC  Take 81 mg by mouth daily. , Historical    loratadine 10 MG Oral Tab  Take 10 mg by mouth daily. , Historical      STOP taking these medications    Mupirocin Calcium (BACTROBAN NASAL) 2 % Nasal Ointment          Louise Jhaveri PA-C  3/8/2022  6:52 AM

## 2022-04-26 ENCOUNTER — TELEPHONE (OUTPATIENT)
Dept: ORTHOPEDICS CLINIC | Facility: CLINIC | Age: 62
End: 2022-04-26

## 2022-04-26 NOTE — TELEPHONE ENCOUNTER
12/17/21 left knee xrays in epic  Narrative & Impression   X-ray left knee 4 views: Medial compartment is bone-on-bone. Severe osteoarthritis. No acute findings.      Future Appointments   Date Time Provider Sabine Marques   6/13/2022  4:20 PM Liya Restrepo MD [facility]

## 2022-06-13 ENCOUNTER — OFFICE VISIT (OUTPATIENT)
Dept: ORTHOPEDICS CLINIC | Facility: CLINIC | Age: 62
End: 2022-06-13
Payer: COMMERCIAL

## 2022-06-13 VITALS — WEIGHT: 140 LBS | HEIGHT: 62 IN | HEART RATE: 92 BPM | OXYGEN SATURATION: 97 % | BODY MASS INDEX: 25.76 KG/M2

## 2022-06-13 DIAGNOSIS — M17.12 PRIMARY OSTEOARTHRITIS OF LEFT KNEE: Primary | ICD-10-CM

## 2022-06-13 PROCEDURE — 99203 OFFICE O/P NEW LOW 30 MIN: CPT | Performed by: ORTHOPAEDIC SURGERY

## 2022-06-13 PROCEDURE — 3008F BODY MASS INDEX DOCD: CPT | Performed by: ORTHOPAEDIC SURGERY

## 2022-06-13 RX ORDER — ATORVASTATIN CALCIUM 20 MG/1
TABLET, FILM COATED ORAL
COMMUNITY
Start: 2022-06-10

## 2022-07-12 ENCOUNTER — TELEPHONE (OUTPATIENT)
Dept: ORTHOPEDICS CLINIC | Facility: CLINIC | Age: 62
End: 2022-07-12

## 2022-07-12 NOTE — TELEPHONE ENCOUNTER
Injection Received       Please call and schedule patient a appointment for Left Knee Synvisc-one  injection here at [facility] .  Please send back to me with date and time once scheduled

## 2022-07-14 NOTE — TELEPHONE ENCOUNTER
Patient has been scheduled at the [facility].     Future Appointments   Date Time Provider Sabine Shelli   7/20/2022  4:30 PM Armida Pearson PA-C [facility]

## 2022-07-20 ENCOUNTER — OFFICE VISIT (OUTPATIENT)
Dept: ORTHOPEDICS CLINIC | Facility: CLINIC | Age: 62
End: 2022-07-20
Payer: COMMERCIAL

## 2022-07-20 VITALS — OXYGEN SATURATION: 99 % | HEART RATE: 78 BPM

## 2022-07-20 DIAGNOSIS — M17.12 PRIMARY OSTEOARTHRITIS OF LEFT KNEE: Primary | ICD-10-CM

## 2022-07-20 PROCEDURE — 20610 DRAIN/INJ JOINT/BURSA W/O US: CPT | Performed by: PHYSICIAN ASSISTANT

## 2022-07-20 NOTE — PROCEDURES
After informed consent, the patient's left knee was marked, locally anesthetized with skin refrigerant, prepped with topical antiseptic, and injected with 6mL of 48mg/6mL Synvisc One through the inferolateral portal.  A band-aid was applied. The patient tolerated the procedure well.     Chas Zurtia PA-C  [address] Orthopedic Surgery

## 2022-07-26 RX ORDER — SCOLOPAMINE TRANSDERMAL SYSTEM 1 MG/1
1 PATCH, EXTENDED RELEASE TRANSDERMAL ONCE
Status: CANCELLED | OUTPATIENT
Start: 2022-07-26 | End: 2022-07-26

## 2022-07-26 RX ORDER — HYDROXYCHLOROQUINE SULFATE 200 MG/1
200 TABLET, FILM COATED ORAL 2 TIMES DAILY
COMMUNITY

## 2022-08-12 ENCOUNTER — LABORATORY ENCOUNTER (OUTPATIENT)
Dept: LAB | Age: 62
End: 2022-08-12
Attending: ORTHOPAEDIC SURGERY
Payer: COMMERCIAL

## 2022-08-12 DIAGNOSIS — M75.101 RIGHT ROTATOR CUFF TEAR ARTHROPATHY: ICD-10-CM

## 2022-08-12 DIAGNOSIS — Z01.818 PREOPERATIVE TESTING: ICD-10-CM

## 2022-08-12 DIAGNOSIS — M12.811 RIGHT ROTATOR CUFF TEAR ARTHROPATHY: ICD-10-CM

## 2022-08-12 LAB
ANION GAP SERPL CALC-SCNC: 5 MMOL/L (ref 0–18)
BASOPHILS # BLD AUTO: 0.05 X10(3) UL (ref 0–0.2)
BASOPHILS NFR BLD AUTO: 0.8 %
BUN BLD-MCNC: 15 MG/DL (ref 7–18)
BUN/CREAT SERPL: 18.8 (ref 10–20)
CALCIUM BLD-MCNC: 9.2 MG/DL (ref 8.5–10.1)
CHLORIDE SERPL-SCNC: 103 MMOL/L (ref 98–112)
CO2 SERPL-SCNC: 29 MMOL/L (ref 21–32)
CREAT BLD-MCNC: 0.8 MG/DL
DEPRECATED RDW RBC AUTO: 41.6 FL (ref 35.1–46.3)
EOSINOPHIL # BLD AUTO: 0.38 X10(3) UL (ref 0–0.7)
EOSINOPHIL NFR BLD AUTO: 6.1 %
ERYTHROCYTE [DISTWIDTH] IN BLOOD BY AUTOMATED COUNT: 11.9 % (ref 11–15)
FASTING STATUS PATIENT QL REPORTED: NO
GFR SERPLBLD BASED ON 1.73 SQ M-ARVRAT: 83 ML/MIN/1.73M2 (ref 60–?)
GLUCOSE BLD-MCNC: 131 MG/DL (ref 70–99)
HCT VFR BLD AUTO: 33.3 %
HGB BLD-MCNC: 11 G/DL
IMM GRANULOCYTES # BLD AUTO: 0.01 X10(3) UL (ref 0–1)
IMM GRANULOCYTES NFR BLD: 0.2 %
LYMPHOCYTES # BLD AUTO: 1.93 X10(3) UL (ref 1–4)
LYMPHOCYTES NFR BLD AUTO: 31.1 %
MCH RBC QN AUTO: 31.2 PG (ref 26–34)
MCHC RBC AUTO-ENTMCNC: 33 G/DL (ref 31–37)
MCV RBC AUTO: 94.3 FL
MONOCYTES # BLD AUTO: 0.65 X10(3) UL (ref 0.1–1)
MONOCYTES NFR BLD AUTO: 10.5 %
NEUTROPHILS # BLD AUTO: 3.18 X10 (3) UL (ref 1.5–7.7)
NEUTROPHILS # BLD AUTO: 3.18 X10(3) UL (ref 1.5–7.7)
NEUTROPHILS NFR BLD AUTO: 51.3 %
OSMOLALITY SERPL CALC.SUM OF ELEC: 287 MOSM/KG (ref 275–295)
PLATELET # BLD AUTO: 307 10(3)UL (ref 150–450)
POTASSIUM SERPL-SCNC: 3.7 MMOL/L (ref 3.5–5.1)
RBC # BLD AUTO: 3.53 X10(6)UL
SODIUM SERPL-SCNC: 137 MMOL/L (ref 136–145)
WBC # BLD AUTO: 6.2 X10(3) UL (ref 4–11)

## 2022-08-12 PROCEDURE — 86850 RBC ANTIBODY SCREEN: CPT

## 2022-08-12 PROCEDURE — 80048 BASIC METABOLIC PNL TOTAL CA: CPT

## 2022-08-12 PROCEDURE — 87081 CULTURE SCREEN ONLY: CPT

## 2022-08-12 PROCEDURE — 85025 COMPLETE CBC W/AUTO DIFF WBC: CPT

## 2022-08-12 PROCEDURE — 86900 BLOOD TYPING SEROLOGIC ABO: CPT

## 2022-08-12 PROCEDURE — 86901 BLOOD TYPING SEROLOGIC RH(D): CPT

## 2022-08-13 LAB
ANTIBODY SCREEN: NEGATIVE
RH BLOOD TYPE: NEGATIVE

## 2022-08-23 ENCOUNTER — LAB ENCOUNTER (OUTPATIENT)
Dept: LAB | Age: 62
End: 2022-08-23
Attending: ORTHOPAEDIC SURGERY
Payer: COMMERCIAL

## 2022-08-23 DIAGNOSIS — Z20.822 ENCOUNTER FOR PREOPERATIVE SCREENING LABORATORY TESTING FOR COVID-19 VIRUS: ICD-10-CM

## 2022-08-23 DIAGNOSIS — Z01.812 ENCOUNTER FOR PREOPERATIVE SCREENING LABORATORY TESTING FOR COVID-19 VIRUS: ICD-10-CM

## 2022-08-23 LAB — SARS-COV-2 RNA RESP QL NAA+PROBE: NOT DETECTED

## 2022-08-24 ENCOUNTER — ANESTHESIA EVENT (OUTPATIENT)
Dept: SURGERY | Facility: HOSPITAL | Age: 62
End: 2022-08-24
Payer: COMMERCIAL

## 2022-08-25 ENCOUNTER — HOSPITAL ENCOUNTER (OUTPATIENT)
Facility: HOSPITAL | Age: 62
Discharge: NURSING, REHAB OR LONG-TERM CARE | End: 2022-08-30
Attending: ORTHOPAEDIC SURGERY | Admitting: ORTHOPAEDIC SURGERY
Payer: COMMERCIAL

## 2022-08-25 ENCOUNTER — ANESTHESIA (OUTPATIENT)
Dept: SURGERY | Facility: HOSPITAL | Age: 62
End: 2022-08-25
Payer: COMMERCIAL

## 2022-08-25 ENCOUNTER — APPOINTMENT (OUTPATIENT)
Dept: GENERAL RADIOLOGY | Facility: HOSPITAL | Age: 62
End: 2022-08-25
Attending: ORTHOPAEDIC SURGERY
Payer: COMMERCIAL

## 2022-08-25 DIAGNOSIS — M12.811 RIGHT ROTATOR CUFF TEAR ARTHROPATHY: Primary | ICD-10-CM

## 2022-08-25 DIAGNOSIS — M75.101 RIGHT ROTATOR CUFF TEAR ARTHROPATHY: Primary | ICD-10-CM

## 2022-08-25 DIAGNOSIS — Z01.812 ENCOUNTER FOR PREOPERATIVE SCREENING LABORATORY TESTING FOR COVID-19 VIRUS: ICD-10-CM

## 2022-08-25 DIAGNOSIS — Z20.822 ENCOUNTER FOR PREOPERATIVE SCREENING LABORATORY TESTING FOR COVID-19 VIRUS: ICD-10-CM

## 2022-08-25 PROCEDURE — 76942 ECHO GUIDE FOR BIOPSY: CPT | Performed by: ANESTHESIOLOGY

## 2022-08-25 PROCEDURE — 0RRJ00Z REPLACEMENT OF RIGHT SHOULDER JOINT WITH REVERSE BALL AND SOCKET SYNTHETIC SUBSTITUTE, OPEN APPROACH: ICD-10-PCS | Performed by: ORTHOPAEDIC SURGERY

## 2022-08-25 PROCEDURE — 88305 TISSUE EXAM BY PATHOLOGIST: CPT | Performed by: ORTHOPAEDIC SURGERY

## 2022-08-25 PROCEDURE — 73030 X-RAY EXAM OF SHOULDER: CPT | Performed by: ORTHOPAEDIC SURGERY

## 2022-08-25 PROCEDURE — 88311 DECALCIFY TISSUE: CPT | Performed by: ORTHOPAEDIC SURGERY

## 2022-08-25 DEVICE — BIOSTOP G BIORESORBABLE CEMENT RESTRICTOR SIZE 10
Type: IMPLANTABLE DEVICE | Site: SHOULDER | Status: FUNCTIONAL
Brand: BIOSTOP

## 2022-08-25 DEVICE — DELTA XTEND PREMIERON X-LINKED PE HUMERAL CUP HIGH MOBILITY SIZE 38 +9MM
Type: IMPLANTABLE DEVICE | Site: SHOULDER | Status: FUNCTIONAL
Brand: DELTA XTEND

## 2022-08-25 DEVICE — DELTA XTEND LOCKING METAGLENE SCREW DIA 4.5 LG 24MM
Type: IMPLANTABLE DEVICE | Site: SHOULDER | Status: FUNCTIONAL
Brand: DELTA XTEND

## 2022-08-25 DEVICE — SMARTSET HIGH PERFORMANCE MV MEDIUM VISCOSITY BONE CEMENT 40G
Type: IMPLANTABLE DEVICE | Site: SHOULDER | Status: FUNCTIONAL
Brand: SMARTSET

## 2022-08-25 DEVICE — IMPLANTABLE DEVICE
Type: IMPLANTABLE DEVICE | Site: SHOULDER | Status: FUNCTIONAL
Brand: QUIK-USE®

## 2022-08-25 DEVICE — DELTA XTEND LATERALIZED GLENOSPHERE +0MM STANDARD 038MM
Type: IMPLANTABLE DEVICE | Site: SHOULDER | Status: FUNCTIONAL
Brand: DELTA XTEND

## 2022-08-25 DEVICE — DELTA XTEND NON LOCKING METAGLENE SCREW DIA 4.5 LG 18MM
Type: IMPLANTABLE DEVICE | Site: SHOULDER | Status: FUNCTIONAL
Brand: DELTA XTEND

## 2022-08-25 DEVICE — DELTA XTEND CEMENTLESS METAGLENE HA
Type: IMPLANTABLE DEVICE | Site: SHOULDER | Status: FUNCTIONAL
Brand: DELTA XTEND

## 2022-08-25 DEVICE — DELTA XTEND MONOBLOC HUM CEMENTED EPIPHYSIS SZ1 /DIA10 STD
Type: IMPLANTABLE DEVICE | Site: SHOULDER | Status: FUNCTIONAL
Brand: DELTA XTEND

## 2022-08-25 RX ORDER — CETIRIZINE HYDROCHLORIDE 10 MG/1
10 TABLET ORAL DAILY
Status: DISCONTINUED | OUTPATIENT
Start: 2022-08-26 | End: 2022-08-30

## 2022-08-25 RX ORDER — CEFAZOLIN SODIUM/WATER 2 G/20 ML
2 SYRINGE (ML) INTRAVENOUS EVERY 8 HOURS
Status: COMPLETED | OUTPATIENT
Start: 2022-08-25 | End: 2022-08-26

## 2022-08-25 RX ORDER — ACETAMINOPHEN 325 MG/1
TABLET ORAL
Status: COMPLETED
Start: 2022-08-25 | End: 2022-08-25

## 2022-08-25 RX ORDER — HYDROMORPHONE HYDROCHLORIDE 1 MG/ML
0.8 INJECTION, SOLUTION INTRAMUSCULAR; INTRAVENOUS; SUBCUTANEOUS EVERY 2 HOUR PRN
Status: DISCONTINUED | OUTPATIENT
Start: 2022-08-25 | End: 2022-08-30

## 2022-08-25 RX ORDER — MIDAZOLAM HYDROCHLORIDE 1 MG/ML
INJECTION INTRAMUSCULAR; INTRAVENOUS AS NEEDED
Status: DISCONTINUED | OUTPATIENT
Start: 2022-08-25 | End: 2022-08-25 | Stop reason: SURG

## 2022-08-25 RX ORDER — KETOROLAC TROMETHAMINE 15 MG/ML
15 INJECTION, SOLUTION INTRAMUSCULAR; INTRAVENOUS EVERY 6 HOURS
Status: COMPLETED | OUTPATIENT
Start: 2022-08-25 | End: 2022-08-26

## 2022-08-25 RX ORDER — SODIUM CHLORIDE, SODIUM LACTATE, POTASSIUM CHLORIDE, CALCIUM CHLORIDE 600; 310; 30; 20 MG/100ML; MG/100ML; MG/100ML; MG/100ML
INJECTION, SOLUTION INTRAVENOUS CONTINUOUS
Status: DISCONTINUED | OUTPATIENT
Start: 2022-08-25 | End: 2022-08-25 | Stop reason: HOSPADM

## 2022-08-25 RX ORDER — HYDROMORPHONE HYDROCHLORIDE 1 MG/ML
0.4 INJECTION, SOLUTION INTRAMUSCULAR; INTRAVENOUS; SUBCUTANEOUS EVERY 2 HOUR PRN
Status: DISCONTINUED | OUTPATIENT
Start: 2022-08-25 | End: 2022-08-30

## 2022-08-25 RX ORDER — ONDANSETRON 2 MG/ML
INJECTION INTRAMUSCULAR; INTRAVENOUS AS NEEDED
Status: DISCONTINUED | OUTPATIENT
Start: 2022-08-25 | End: 2022-08-25 | Stop reason: SURG

## 2022-08-25 RX ORDER — ASPIRIN 81 MG/1
81 TABLET ORAL DAILY
Status: DISCONTINUED | OUTPATIENT
Start: 2022-08-25 | End: 2022-08-25

## 2022-08-25 RX ORDER — HYDROXYCHLOROQUINE SULFATE 200 MG/1
200 TABLET, FILM COATED ORAL 2 TIMES DAILY
Status: DISCONTINUED | OUTPATIENT
Start: 2022-08-25 | End: 2022-08-30

## 2022-08-25 RX ORDER — SODIUM CHLORIDE, SODIUM LACTATE, POTASSIUM CHLORIDE, CALCIUM CHLORIDE 600; 310; 30; 20 MG/100ML; MG/100ML; MG/100ML; MG/100ML
INJECTION, SOLUTION INTRAVENOUS CONTINUOUS
Status: DISCONTINUED | OUTPATIENT
Start: 2022-08-25 | End: 2022-08-30

## 2022-08-25 RX ORDER — ACETAMINOPHEN 500 MG
1000 TABLET ORAL ONCE AS NEEDED
Status: DISCONTINUED | OUTPATIENT
Start: 2022-08-25 | End: 2022-08-25 | Stop reason: HOSPADM

## 2022-08-25 RX ORDER — NALOXONE HYDROCHLORIDE 0.4 MG/ML
80 INJECTION, SOLUTION INTRAMUSCULAR; INTRAVENOUS; SUBCUTANEOUS AS NEEDED
Status: DISCONTINUED | OUTPATIENT
Start: 2022-08-25 | End: 2022-08-25 | Stop reason: HOSPADM

## 2022-08-25 RX ORDER — BISACODYL 10 MG
10 SUPPOSITORY, RECTAL RECTAL
Status: DISCONTINUED | OUTPATIENT
Start: 2022-08-25 | End: 2022-08-30

## 2022-08-25 RX ORDER — CALCIUM CARBONATE/VITAMIN D3 250-3.125
1 TABLET ORAL DAILY
Status: DISCONTINUED | OUTPATIENT
Start: 2022-08-26 | End: 2022-08-30

## 2022-08-25 RX ORDER — TRANEXAMIC ACID 10 MG/ML
INJECTION, SOLUTION INTRAVENOUS AS NEEDED
Status: DISCONTINUED | OUTPATIENT
Start: 2022-08-25 | End: 2022-08-25 | Stop reason: SURG

## 2022-08-25 RX ORDER — ONDANSETRON 2 MG/ML
4 INJECTION INTRAMUSCULAR; INTRAVENOUS EVERY 6 HOURS PRN
Status: DISCONTINUED | OUTPATIENT
Start: 2022-08-25 | End: 2022-08-30

## 2022-08-25 RX ORDER — HYDROMORPHONE HYDROCHLORIDE 1 MG/ML
0.6 INJECTION, SOLUTION INTRAMUSCULAR; INTRAVENOUS; SUBCUTANEOUS EVERY 5 MIN PRN
Status: DISCONTINUED | OUTPATIENT
Start: 2022-08-25 | End: 2022-08-25 | Stop reason: HOSPADM

## 2022-08-25 RX ORDER — DEXAMETHASONE SODIUM PHOSPHATE 4 MG/ML
VIAL (ML) INJECTION AS NEEDED
Status: DISCONTINUED | OUTPATIENT
Start: 2022-08-25 | End: 2022-08-25 | Stop reason: SURG

## 2022-08-25 RX ORDER — VITAMIN E 268 MG
400 CAPSULE ORAL DAILY
Status: DISCONTINUED | OUTPATIENT
Start: 2022-08-25 | End: 2022-08-30

## 2022-08-25 RX ORDER — HYDROMORPHONE HYDROCHLORIDE 1 MG/ML
0.4 INJECTION, SOLUTION INTRAMUSCULAR; INTRAVENOUS; SUBCUTANEOUS EVERY 5 MIN PRN
Status: DISCONTINUED | OUTPATIENT
Start: 2022-08-25 | End: 2022-08-25 | Stop reason: HOSPADM

## 2022-08-25 RX ORDER — ASPIRIN 81 MG/1
81 TABLET ORAL 2 TIMES DAILY
Status: DISCONTINUED | OUTPATIENT
Start: 2022-08-25 | End: 2022-08-30

## 2022-08-25 RX ORDER — TRAMADOL HYDROCHLORIDE 50 MG/1
50 TABLET ORAL EVERY 12 HOURS SCHEDULED
Status: DISCONTINUED | OUTPATIENT
Start: 2022-08-25 | End: 2022-08-30

## 2022-08-25 RX ORDER — POLYETHYLENE GLYCOL 3350 17 G/17G
17 POWDER, FOR SOLUTION ORAL DAILY PRN
Status: DISCONTINUED | OUTPATIENT
Start: 2022-08-25 | End: 2022-08-30

## 2022-08-25 RX ORDER — CHLORAL HYDRATE 500 MG
1000 CAPSULE ORAL DAILY
COMMUNITY

## 2022-08-25 RX ORDER — PROCHLORPERAZINE EDISYLATE 5 MG/ML
5 INJECTION INTRAMUSCULAR; INTRAVENOUS EVERY 8 HOURS PRN
Status: DISCONTINUED | OUTPATIENT
Start: 2022-08-25 | End: 2022-08-25 | Stop reason: HOSPADM

## 2022-08-25 RX ORDER — HYDROMORPHONE HYDROCHLORIDE 1 MG/ML
0.2 INJECTION, SOLUTION INTRAMUSCULAR; INTRAVENOUS; SUBCUTANEOUS EVERY 5 MIN PRN
Status: DISCONTINUED | OUTPATIENT
Start: 2022-08-25 | End: 2022-08-25 | Stop reason: HOSPADM

## 2022-08-25 RX ORDER — CHLORAL HYDRATE 500 MG
1000 CAPSULE ORAL DAILY
Status: DISCONTINUED | OUTPATIENT
Start: 2022-08-25 | End: 2022-08-25 | Stop reason: RX

## 2022-08-25 RX ORDER — GABAPENTIN 300 MG/1
300 CAPSULE ORAL 3 TIMES DAILY
Status: DISCONTINUED | OUTPATIENT
Start: 2022-08-25 | End: 2022-08-30

## 2022-08-25 RX ORDER — DIPHENHYDRAMINE HYDROCHLORIDE 50 MG/ML
25 INJECTION INTRAMUSCULAR; INTRAVENOUS ONCE AS NEEDED
Status: ACTIVE | OUTPATIENT
Start: 2022-08-25 | End: 2022-08-25

## 2022-08-25 RX ORDER — ATORVASTATIN CALCIUM 20 MG/1
20 TABLET, FILM COATED ORAL NIGHTLY
Status: DISCONTINUED | OUTPATIENT
Start: 2022-08-25 | End: 2022-08-30

## 2022-08-25 RX ORDER — ACETAMINOPHEN 325 MG/1
650 TABLET ORAL ONCE
Status: COMPLETED | OUTPATIENT
Start: 2022-08-25 | End: 2022-08-25

## 2022-08-25 RX ORDER — GLYCOPYRROLATE 0.2 MG/ML
INJECTION, SOLUTION INTRAMUSCULAR; INTRAVENOUS AS NEEDED
Status: DISCONTINUED | OUTPATIENT
Start: 2022-08-25 | End: 2022-08-25 | Stop reason: SURG

## 2022-08-25 RX ORDER — ROCURONIUM BROMIDE 10 MG/ML
INJECTION, SOLUTION INTRAVENOUS AS NEEDED
Status: DISCONTINUED | OUTPATIENT
Start: 2022-08-25 | End: 2022-08-25 | Stop reason: SURG

## 2022-08-25 RX ORDER — NEOSTIGMINE METHYLSULFATE 1 MG/ML
INJECTION, SOLUTION INTRAVENOUS AS NEEDED
Status: DISCONTINUED | OUTPATIENT
Start: 2022-08-25 | End: 2022-08-25 | Stop reason: SURG

## 2022-08-25 RX ORDER — HYDROCODONE BITARTRATE AND ACETAMINOPHEN 5; 325 MG/1; MG/1
2 TABLET ORAL ONCE AS NEEDED
Status: DISCONTINUED | OUTPATIENT
Start: 2022-08-25 | End: 2022-08-25 | Stop reason: HOSPADM

## 2022-08-25 RX ORDER — ACETAMINOPHEN 500 MG
1000 TABLET ORAL ONCE
Status: DISCONTINUED | OUTPATIENT
Start: 2022-08-25 | End: 2022-08-30

## 2022-08-25 RX ORDER — OXYCODONE HYDROCHLORIDE 10 MG/1
10 TABLET ORAL EVERY 4 HOURS PRN
Status: DISCONTINUED | OUTPATIENT
Start: 2022-08-25 | End: 2022-08-30

## 2022-08-25 RX ORDER — SENNOSIDES 8.6 MG
17.2 TABLET ORAL NIGHTLY
Status: DISCONTINUED | OUTPATIENT
Start: 2022-08-25 | End: 2022-08-30

## 2022-08-25 RX ORDER — ASCORBIC ACID 500 MG
250 TABLET ORAL DAILY
Status: DISCONTINUED | OUTPATIENT
Start: 2022-08-26 | End: 2022-08-30

## 2022-08-25 RX ORDER — SODIUM PHOSPHATE, DIBASIC AND SODIUM PHOSPHATE, MONOBASIC 7; 19 G/133ML; G/133ML
1 ENEMA RECTAL ONCE AS NEEDED
Status: DISCONTINUED | OUTPATIENT
Start: 2022-08-25 | End: 2022-08-30

## 2022-08-25 RX ORDER — LIDOCAINE HYDROCHLORIDE 10 MG/ML
INJECTION, SOLUTION EPIDURAL; INFILTRATION; INTRACAUDAL; PERINEURAL AS NEEDED
Status: DISCONTINUED | OUTPATIENT
Start: 2022-08-25 | End: 2022-08-25 | Stop reason: SURG

## 2022-08-25 RX ORDER — TIZANIDINE 2 MG/1
2 TABLET ORAL EVERY 6 HOURS PRN
Status: DISCONTINUED | OUTPATIENT
Start: 2022-08-25 | End: 2022-08-30

## 2022-08-25 RX ORDER — ACETAMINOPHEN 325 MG/1
650 TABLET ORAL 4 TIMES DAILY
Status: DISCONTINUED | OUTPATIENT
Start: 2022-08-25 | End: 2022-08-30

## 2022-08-25 RX ORDER — PROCHLORPERAZINE EDISYLATE 5 MG/ML
5 INJECTION INTRAMUSCULAR; INTRAVENOUS EVERY 8 HOURS PRN
Status: DISCONTINUED | OUTPATIENT
Start: 2022-08-25 | End: 2022-08-30

## 2022-08-25 RX ORDER — HYDROCODONE BITARTRATE AND ACETAMINOPHEN 5; 325 MG/1; MG/1
1 TABLET ORAL ONCE AS NEEDED
Status: DISCONTINUED | OUTPATIENT
Start: 2022-08-25 | End: 2022-08-25 | Stop reason: HOSPADM

## 2022-08-25 RX ORDER — DOCUSATE SODIUM 100 MG/1
100 CAPSULE, LIQUID FILLED ORAL 2 TIMES DAILY
Status: DISCONTINUED | OUTPATIENT
Start: 2022-08-25 | End: 2022-08-30

## 2022-08-25 RX ORDER — ROPIVACAINE HYDROCHLORIDE 5 MG/ML
INJECTION, SOLUTION EPIDURAL; INFILTRATION; PERINEURAL AS NEEDED
Status: DISCONTINUED | OUTPATIENT
Start: 2022-08-25 | End: 2022-08-25 | Stop reason: SURG

## 2022-08-25 RX ORDER — CEFAZOLIN SODIUM/WATER 2 G/20 ML
2 SYRINGE (ML) INTRAVENOUS ONCE
Status: COMPLETED | OUTPATIENT
Start: 2022-08-25 | End: 2022-08-25

## 2022-08-25 RX ORDER — OXYCODONE HYDROCHLORIDE 5 MG/1
5 TABLET ORAL EVERY 4 HOURS PRN
Status: DISCONTINUED | OUTPATIENT
Start: 2022-08-25 | End: 2022-08-30

## 2022-08-25 RX ORDER — DEXAMETHASONE SODIUM PHOSPHATE 10 MG/ML
8 INJECTION, SOLUTION INTRAMUSCULAR; INTRAVENOUS ONCE
Status: COMPLETED | OUTPATIENT
Start: 2022-08-26 | End: 2022-08-26

## 2022-08-25 RX ORDER — ONDANSETRON 2 MG/ML
4 INJECTION INTRAMUSCULAR; INTRAVENOUS EVERY 6 HOURS PRN
Status: DISCONTINUED | OUTPATIENT
Start: 2022-08-25 | End: 2022-08-25 | Stop reason: HOSPADM

## 2022-08-25 RX ORDER — CEFAZOLIN SODIUM/WATER 2 G/20 ML
SYRINGE (ML) INTRAVENOUS
Status: DISPENSED
Start: 2022-08-25 | End: 2022-08-25

## 2022-08-25 RX ADMIN — ROCURONIUM BROMIDE 35 MG: 10 INJECTION, SOLUTION INTRAVENOUS at 13:03:00

## 2022-08-25 RX ADMIN — ONDANSETRON 4 MG: 2 INJECTION INTRAMUSCULAR; INTRAVENOUS at 15:54:00

## 2022-08-25 RX ADMIN — ROCURONIUM BROMIDE 15 MG: 10 INJECTION, SOLUTION INTRAVENOUS at 13:28:00

## 2022-08-25 RX ADMIN — LIDOCAINE HYDROCHLORIDE 25 MG: 10 INJECTION, SOLUTION EPIDURAL; INFILTRATION; INTRACAUDAL; PERINEURAL at 13:03:00

## 2022-08-25 RX ADMIN — NEOSTIGMINE METHYLSULFATE 3 MG: 1 INJECTION, SOLUTION INTRAVENOUS at 15:56:00

## 2022-08-25 RX ADMIN — GLYCOPYRROLATE 0.4 MG: 0.2 INJECTION, SOLUTION INTRAMUSCULAR; INTRAVENOUS at 15:56:00

## 2022-08-25 RX ADMIN — TRANEXAMIC ACID 1000 MG: 10 INJECTION, SOLUTION INTRAVENOUS at 13:19:00

## 2022-08-25 RX ADMIN — ROPIVACAINE HYDROCHLORIDE 15 ML: 5 INJECTION, SOLUTION EPIDURAL; INFILTRATION; PERINEURAL at 12:59:00

## 2022-08-25 RX ADMIN — MIDAZOLAM HYDROCHLORIDE 2 MG: 1 INJECTION INTRAMUSCULAR; INTRAVENOUS at 12:50:00

## 2022-08-25 RX ADMIN — DEXAMETHASONE SODIUM PHOSPHATE 4 MG: 4 MG/ML VIAL (ML) INJECTION at 13:28:00

## 2022-08-25 RX ADMIN — CEFAZOLIN SODIUM/WATER 2 G: 2 G/20 ML SYRINGE (ML) INTRAVENOUS at 13:16:00

## 2022-08-25 RX ADMIN — SODIUM CHLORIDE, SODIUM LACTATE, POTASSIUM CHLORIDE, CALCIUM CHLORIDE: 600; 310; 30; 20 INJECTION, SOLUTION INTRAVENOUS at 16:22:00

## 2022-08-25 NOTE — PROGRESS NOTES
Pharmacy Note: Dietary Supplement Discontinuation Per Policy    Tumeric, omega 3 fatty acids( fish oil) cap 1000 mg,   has been discontinued on Tre Franck per policy. This supplement may be restarted upon discharge using the medication reconciliation process.     Thank you,   Soheila Howard, PharmD  8/25/2022,  6:08 PM

## 2022-08-25 NOTE — ANESTHESIA PROCEDURE NOTES
Regional Block  Performed by: Javier Lefort, DO  Authorized by: Javier Lefort, DO       General Information and Staff    Start Time:  8/25/2022 12:55 PM  End Time:  8/25/2022 12:59 PM  Anesthesiologist:  Javier Lefort, DO  Performed by: Anesthesiologist  Patient Location:  OR      Site Identification: real time ultrasound guided and image stored and retrievable    Block site/laterality marked before start: site marked  Reason for Block: at surgeon's request and post-op pain management    Preanesthetic Checklist: 2 patient identifers, IV checked, risks and benefits discussed, monitors and equipment checked, pre-op evaluation, timeout performed, anesthesia consent, sterile technique used, no prohibitive neurological deficits and no local skin infection at insertion site      Procedure Details    Patient Position:  Supine  Prep: ChloraPrep    Monitoring:  Cardiac monitor, continuous pulse ox and blood pressure cuff  Block Type: Interscalene  Laterality:  Right  Injection Technique:  Single-shot    Needle    Needle Type:  Short-bevel and echogenic  Needle Gauge:  21 G  Needle Length:  100 mm  Needle Localization:  Ultrasound guidance  Reason for Ultrasound Use: appropriate spread of the medication was noted in real time and no ultrasound evidence of intravascular and/or intraneural injection            Assessment    Injection Assessment:  Good spread noted, negative resistance, negative aspiration for heme, incremental injection and low pressure  Heart Rate Change: No    - Patient tolerated block procedure well without evidence of immediate block related complications.      Medications      Additional Comments    Medication:  Ropivacaine 0.5% 15mL

## 2022-08-25 NOTE — INTERVAL H&P NOTE
Pre-op Diagnosis: RIGHT ROTATOR CUFF TEAR ARTHROPATHY  PRIMARY OSTEOARTHRITIS OF LEFT KNEE    The above referenced H&P was reviewed by Eula Farmer PA-C on 8/25/2022, the patient was examined and no significant changes have occurred in the patient's condition since the H&P was performed. I discussed with the patient and/or legal representative the potential benefits, risks and side effects of this procedure; the likelihood of the patient achieving goals; and potential problems that might occur during recuperation. I discussed reasonable alternatives to the procedure, including risks, benefits and side effects related to the alternatives and risks related to not receiving this procedure. We will proceed with procedure as planned.

## 2022-08-25 NOTE — BRIEF OP NOTE
Pre-Operative Diagnosis: RIGHT ROTATOR CUFF TEAR ARTHROPATHY  PRIMARY OSTEOARTHRITIS OF LEFT KNEE     Post-Operative Diagnosis: RIGHT ROTATOR CUFF TEAR ARTHROPATHYPRIMARY OSTEOARTHRITIS OF LEFT KNEE      Procedure Performed:   RIGHT SHOULDER REVERSE TOTAL SHOULDER ARTHROPLASTY    Surgeon(s) and Role:     Almas Joseph MD - Primary    Assistant(s):  PA:  Yelitza Carver PA-C     Surgical Findings: c/w dx, severe bone loss in the humeral head     Specimen: none     Estimated Blood Loss: Blood Output: 100 mL (8/25/2022  3:55 PM)      Dictation Number:       Georgiana Holland MD  8/25/2022  4:18 PM

## 2022-08-25 NOTE — ANESTHESIA PROCEDURE NOTES
Airway  Date/Time: 8/25/2022 1:05 PM  Urgency: elective    Airway not difficult    General Information and Staff    Patient location during procedure: OR  Anesthesiologist: Chente Dahl DO  Performed: anesthesiologist     Indications and Patient Condition  Indications for airway management: anesthesia  Sedation level: deep  Preoxygenated: yes  Patient position: sniffing  Mask difficulty assessment: 1 - vent by mask    Final Airway Details  Final airway type: endotracheal airway      Successful airway: ETT  Cuffed: yes   Successful intubation technique: direct laryngoscopy  Endotracheal tube insertion site: oral  Blade: Nicholas  Blade size: #3  ETT size (mm): 7.0    Cormack-Lehane Classification: grade IIA - partial view of glottis  Placement verified by: chest auscultation and capnometry   Measured from: lips  ETT to lips (cm): 21  Number of attempts at approach: 1  Number of other approaches attempted: 0

## 2022-08-26 LAB
HCT VFR BLD AUTO: 27.4 %
HGB BLD-MCNC: 9.1 G/DL

## 2022-08-26 PROCEDURE — 96375 TX/PRO/DX INJ NEW DRUG ADDON: CPT

## 2022-08-26 PROCEDURE — 85014 HEMATOCRIT: CPT | Performed by: ORTHOPAEDIC SURGERY

## 2022-08-26 PROCEDURE — 97162 PT EVAL MOD COMPLEX 30 MIN: CPT

## 2022-08-26 PROCEDURE — 97535 SELF CARE MNGMENT TRAINING: CPT

## 2022-08-26 PROCEDURE — 97530 THERAPEUTIC ACTIVITIES: CPT

## 2022-08-26 PROCEDURE — 85018 HEMOGLOBIN: CPT | Performed by: ORTHOPAEDIC SURGERY

## 2022-08-26 PROCEDURE — 97165 OT EVAL LOW COMPLEX 30 MIN: CPT

## 2022-08-26 PROCEDURE — 97116 GAIT TRAINING THERAPY: CPT

## 2022-08-26 NOTE — PROGRESS NOTES
POD 1, Patient block has worn off as of this morning. She is coping with side effects of pain medications. No side effects from the block and is currently on an oral regiment for pain. We will sign off from care.

## 2022-08-26 NOTE — PLAN OF CARE
Pt A&O. On room air. Encouraged use of incentive spirometer and ankle pump exercises every hour while awake. Scds to BLE. Tolerating diet with good appetite. Last BM 8/25/22. Miralax given this AM. Voiding w/o difficulty. Pain managed with current medications. Pt reminded to \"call; don't fall\". Participating in therapy as ordered. Up with min assist using walker and gait belt. WBAT to RUE. Sling to RUE when not using walker. Ice packs as needed for pain/swelling. Therapy recommending acute rehab on discharge. Awaiting KELVIN garza. Pt's spouse, Sandro Bustillos, will be here later today.

## 2022-08-26 NOTE — PLAN OF CARE
A&O x 4. VSS. On RA. N/T to BUE's per baseline, still some decreased sensation post anesthesia block to right arm/hand. Right shoulder and elbow pain well controlled with scheduled pain medication. Microfoam dressing to right shoulder C/D/I. Sling in place/Ice pack as tolerated. Up with mod assist to bathroom, voiding without issue. SCD's/ASA. Reviewed POC, pain management, and fall precautions with pt. PT/OT to see in am. Plan TBD, pt would like [facility] at [facility]. Will continue to monitor.

## 2022-08-26 NOTE — CM/SW NOTE
PMR recommending [facility]. Referrals sent to facilities via [facility]. PASRR completed and added to referral.  Met with pt to discuss DC planning - [facility] vs [facility]. Pt would like to go to [facility] and expressed interest in [facility] or [facility]. Plan for SW to return later today with list of accepting facilities. Insurance Irene Gilberto will be needed. / to remain available for support and/or discharge planning.      Burton Sharma [facility] HospitalKAREN  Discharge Planner  [phone]

## 2022-08-26 NOTE — CM/SW NOTE
Met with pt and provided list of accepting facilities. Pt stated [facility] as first choice and [facility] as second choice. Spoke with Jay Ragsdale from [facility] who expressed concern about pt's medication Rinvoq. She has to confirm if pt can be accepted with this medication. Pt willing to bring med from home, but this may not be allowed per insurance guidelines. She will verify and confirm if pt can be accepted with this medication. She will respond in [address] if pt can be accepted. Insurance Thai Manners will be needed. / to remain available for support and/or discharge planning.      Logan Mary LCSW  Discharge Planner  [phone]

## 2022-08-26 NOTE — OPERATIVE REPORT
[address]    PATIENT'S NAME: Raffi Atkinson   ATTENDING PHYSICIAN: Momo Howard M.D. OPERATING PHYSICIAN: Momo Howard M.D. PATIENT ACCOUNT#:   [de-identified]    LOCATION:  [address] [place] [address]  MEDICAL RECORD #:   [id number]       YOB: 1960  ADMISSION DATE:       08/25/2022      OPERATION DATE:  08/25/2022    OPERATIVE REPORT    PREOPERATIVE DIAGNOSIS:  Right shoulder cuff tear arthropathy. POSTOPERATIVE DIAGNOSIS:  Right shoulder cuff tear arthropathy. PROCEDURE:  Right reverse total shoulder arthroplasty with DePuy delta extend component, a cemented size 8 monoblock humeral component was utilized with a 38 mm standard glenosphere and a standard metaglene base plate with 3 locking screws and 1 nonlocking screw. ANESTHESIA:  General, plus interscalene block. ESTIMATED BLOOD LOSS:  100 mL. ASSISTANT:  Neftaly Carrillo PA-C, whose duties included helping to position the limb, to hold retractors, and to assist in implantation of device. SPECIMENS:  None. COMPLICATIONS:  None. DISPOSITION:  Fair condition to the recovery room. PLAN:  Patient can begin immediate weightbearing exercises and range of motion exercises. INDICATIONS:  The patient is a 59-year-old female, with a history of advanced rheumatoid arthritis. She has a massive rotator cuff tear and deformity of the humeral head and limited function of the shoulder. She was, therefore, offered surgical intervention as she had tried therapy, medications, and cortisone injections. The risks and benefits of the procedure were discussed in detail with the patient including the risk of instability, failure of the implant, infection, and instability. She shows good understanding of these issues and wished to proceed with surgery. FINDINGS:  Patient with massive rotator cuff tear and deformity of the humeral head with significant anterior bone loss, as well as anterior bone loss from the glenoid.      OPERATIVE TECHNIQUE:  On the date of operation, I saw the patient in the holding room, and initialed the surgical site. The patient was taken the operating room and was placed supine on the operating room table. She was given preop dose of antibiotics. She was also given a dose of TXA. An interscalene block was performed by Anesthesia. This was followed by general endotracheal anesthesia. She was placed in modified beach-chair position. A surgical time-out was taken and proper patient, site, and procedure verified. The right shoulder was then prepped and draped in standard surgical fashion. An incision in the anterior aspect of the shoulder was performed. The approach was from the deltoid and pectoralis major. The cephalic vein was identified and was preserved throughout the case. The dissection was then carried out lateral to the conjoined tendon. She had a previous rupture of the long head of the biceps and the line of the biceps was not visualized in the bicipital groove. She had a massive tear of the rotator cuff involving the subscapularis and supraspinatus, as well as the superior portion of the infraspinatus. The capsule was released anteriorly and the shoulder was dislocated. She had significant bone loss and the drill was used to enter the humeral head at its highest point. The intramedullary canal was then reamed to a size 10. The size 10 reamer was left in place and the oscillating saw was used to perform the humeral head cut in neutral rotation. The cap was placed and the shoulder was reduced. The glenoid was then visualized. The anterior capsule and inferior capsule were released, and then the posterior capsule was released off the glenoid. The labrum was resected. She did appear to have some loss of bone in the anterior aspect of the glenoid, but no significant retroversion of the glenoid. Bone loss was from the anterior, inferior quadrant and greater than 75% of the bone was intact. Decision was made to proceed with a standard metaglene base plate. The guide was then placed against the inferior rim of the glenoid and a central wire was placed. The glenoid was then reamed down to subchondral bone. The hole for the central PEG was then drilled and the metaglene was then press-fit into place. This was secured with 4 screws, 3 of them being locking and the posterior being nonlocking. Once the screws had been completely locked, the glenosphere was then threaded into the metaglene base plate. The shoulder was dislocated and the humeral canal was then prepped. Due to the severe bone loss, it was decided to proceed with a cemented implant. The metaphyseal region was reamed and the trial stem was placed. A +9 poly was found to be a good fit. The trials were then removed and the cement restrictor was placed. The humeral stem was then cemented in place. Once the cement had fully cured, the +9 high mobility poly was once again trialed and was found to be a good fit. The poly component was then press-fit on the stem and the shoulder was reduced. The shoulder could be placed through a full range of motion and good stability was noted. The wound was copiously irrigated, and the skin flaps closed with 4-0 Vicryl in the subcutaneous tissue, followed by 4-0 subcuticular Monocryl stitch. Sterile bulky dressings were applied. The patient was awakened from anesthesia and was taken to the recovery room in fair condition. She will be admitted for postoperative observation, as she does have significant mobility limitations due to her lower extremities as well.       Dictated By Nestor De La Garza M.D.  d: 08/25/2022 16:02:33  t: 08/25/2022 22:54:10  [facility] [id number]  /

## 2022-08-27 PROCEDURE — 97535 SELF CARE MNGMENT TRAINING: CPT

## 2022-08-27 PROCEDURE — 97530 THERAPEUTIC ACTIVITIES: CPT

## 2022-08-27 PROCEDURE — 97116 GAIT TRAINING THERAPY: CPT

## 2022-08-27 NOTE — PLAN OF CARE
Problem: Patient/Family Goals  Goal: Patient/Family Long Term Goal  Description: Patient's Long Term Goal: \"be able to use my right arm again\"    Interventions:  - participate in therapy as ordered  Follow restrictions as instructed by MD/PT  - See additional Care Plan goals for specific interventions  Outcome: Progressing  Goal: Patient/Family Short Term Goal  Description: Patient's Short Term Goal: \"get to [facility]\"    Interventions:   - participate in therapy as ordered  Follow restrictions as instructed by MD/PT/OT  - See additional Care Plan goals for specific interventions  Outcome: Progressing     Problem: PAIN - ADULT  Goal: Verbalizes/displays adequate comfort level or patient's stated pain goal  Description: INTERVENTIONS:  - Encourage pt to monitor pain and request assistance  - Assess pain using appropriate pain scale  - Administer analgesics based on type and severity of pain and evaluate response  - Implement non-pharmacological measures as appropriate and evaluate response  - Consider cultural and social influences on pain and pain management  - Manage/alleviate anxiety  - Utilize distraction and/or relaxation techniques  - Monitor for opioid side effects  - Notify MD/LIP if interventions unsuccessful or patient reports new pain  - Anticipate increased pain with activity and pre-medicate as appropriate  Outcome: Progressing     Problem: SAFETY ADULT - FALL  Goal: Free from fall injury  Description: INTERVENTIONS:  - Assess pt frequently for physical needs  - Identify cognitive and physical deficits and behaviors that affect risk of falls.   - [place] fall precautions as indicated by assessment.  - Educate pt/family on patient safety including physical limitations  - Instruct pt to call for assistance with activity based on assessment  - Modify environment to reduce risk of injury  - Provide assistive devices as appropriate  - Consider OT/PT consult to assist with strengthening/mobility  - Encourage toileting schedule  Outcome: Progressing

## 2022-08-27 NOTE — PROGRESS NOTES
AxO x4. Room air, lungs clear. POD #2 of right shoulder arthroplasty, dressing CDI, in a sling, ice pack prn, bruising. Pain management per MAR, good pain control today per patient. Voids. No BM today, +bowel sounds and flatus. WBAT to RUE. Up with assist x1 with walker. Anticipated discharge to [facility], ins auth pending for [facility]. Medically cleared. Plan of care reviewed, all questions answered, call light within reach.

## 2022-08-27 NOTE — PHYSICAL THERAPY NOTE
PHYSICAL THERAPY TREATMENT NOTE - INPATIENT    Room Number: 355/355-A     Session: 1     Number of Visits to Meet Established Goals: 4    Presenting Problem: s/p reverse TSA 8/25  Co-Morbidities : RA, hx ACDF C4-5 3/2/22, s/p L4-5 lami 10/26/21, neuropathy       History related to current admission: Patient is a 60year old female admitted on 8/25/2022 s/p reverse TSA 8/25    Admit 3/2-3/7/22: C4-C5 ACDF, dc'd acute  Admit 10/26-11/2/21: L4-L5 fusion, dc'd [facility]    ASSESSMENT     Pt is progressing well. Reports of R anterior bicep pain with activity today. CGA for bed mobility, sit<>stand and ambulation today. Pt with decreased speed with mobility, cueing required for technique and safety. Pt with shuffled, staggered gait and requires assistance for management of the RW with ambulation. Pt with min use of R UE with ambulation (per MD pt able to use R UE to propel RW). Pt would continue to benefit from inpatient skilled PT to address strength, balance, endurance, and overall functional mobility and safety. The AM-PAC '6-Clicks' Inpatient Basic Mobility Short Form was completed and this patient is demonstrating a 46.58% degree of impairment in mobility. Research supports that patients with this level of impairment may benefit from DC recommendation to [facility]. DISCHARGE RECOMMENDATIONS  PT Discharge Recommendations: Sub-acute rehabilitation     PLAN  PT Treatment Plan: Bed mobility; Body mechanics; Coordination; Endurance; Energy conservation;Patient education; Family education;Gait training;Neuromuscular re-educate;Range of motion;Strengthening;Stoop training;Stair training;Transfer training;Balance training  Rehab Potential : Good  Frequency (Obs): 3-5x/week    CURRENT GOALS     Goal #1 Patient is able to demonstrate supine - sit EOB @ level: minimum assistance-Met with CGA  Upgrade goal to supervision      Goal #2 Patient is able to demonstrate transfers EOB to/from Chair/Wheelchair at assistance level: minimum assistance-Met with CGA  Upgrade goal to supervision     Goal #3 Patient is able to ambulate 75 feet with assist device: walker - rolling at assistance level: minimum assistance-Met with CGA  Upgrade goal to supervision with 150ft with RW     Goal #4    Goal #5    Goal #6    Goal Comments: Goals established on 2022 all goals ongoing (updated 22      SUBJECTIVE  *\"I feel im doing better than yesterday. \"    OBJECTIVE  Precautions: Limb alert - right (sling for comfort)    WEIGHT BEARING RESTRICTION  Weight Bearing Restriction: R upper extremity  R Upper Extremity: Weight Bearing as Tolerated             PAIN ASSESSMENT   Ratin  Location: R bicep  Management Techniques: Activity promotion;Relaxation;Repositioning    BALANCE                                                                                                                       Static Sitting: Good  Dynamic Sitting: Good           Static Standing: Poor +  Dynamic Standing: Poor    ACTIVITY TOLERANCE  Pulse: 75  Heart Rate Source: Monitor                   O2 WALK         AM-PAC '6-Clicks' INPATIENT SHORT FORM - BASIC MOBILITY  How much difficulty does the patient currently have. .. Patient Difficulty: Turning over in bed (including adjusting bedclothes, sheets and blankets)?: A Little   Patient Difficulty: Sitting down on and standing up from a chair with arms (e.g., wheelchair, bedside commode, etc.): A Little   Patient Difficulty: Moving from lying on back to sitting on the side of the bed?: A Little   How much help from another person does the patient currently need. ..    Help from Another: Moving to and from a bed to a chair (including a wheelchair)?: A Little   Help from Another: Need to walk in hospital room?: A Little   Help from Another: Climbing 3-5 steps with a railing?: A Little       AM-PAC Score:  Raw Score: 18   Approx Degree of Impairment: 46.58%   Standardized Score (AM-PAC Scale): 43.63   CMS Modifier (G-Code): CK    FUNCTIONAL ABILITY STATUS  Gait Assessment   Functional Mobility/Gait Assessment  Gait Assistance: Minimum assistance  Distance (ft): 100  Assistive Device: Rolling walker  Pattern: Shuffle    Skilled Therapy Provided    Bed Mobility:  Rolling right: NT  Rolling left: NT    Supine<>Sit: with HOB elevated CGA   Sit<>Supine: NT     Transfer Mobility:  Sit<>Stand: CGA   Stand<>Sit: CGA   Gait: CGA    Therapist's Comments: Chart reviewed and RN approved PT session. Pt lying in bed and agreed to PT. Pt instructed in proper body mechanics with bed mobility. HOB elevated and pt with CGA for bed mobility. Pt performing dressing tasks with OT. Pt then instructed in proper hand placement and integration of RW with transfer. Pt with CGA for sit<>stand. Mild FHP and forward trunk lean. Cued for upright posture. Difficulty with propulsion of RW, as not able to use R UE. Occasional Min A for propulsion of RW with ambulation. Pt required 5 short standing rest breaks. Pt returned to room and left up in chair with all needs in reach. Instructed in exercises , pt has handout in room. RN notified of session. THERAPEUTIC EXERCISES  Lower Extremity Knee extension  Toe raises     Upper Extremity  - open/close and Wrist flex/ext     Position Sitting     Repetitions   10   Sets  10     Patient End of Session: Up in chair;Needs met;Call light within reach;RN aware of session/findings;Bracing education provided per handout; All patient questions and concerns addressed;SCDs in place; Alarm set

## 2022-08-27 NOTE — PLAN OF CARE
A/o x4. RA//Is. N/T at baseline d/t neuropathy and RA. aquacel dsg to r shoulder. Ice packs. Regular diet denies n/v. LBM 8/25. Voiding without difficulty. Up sb. IV SL. POC updated with pt. All safety measures in place. Call light within reach instructed to call for help or assistance.

## 2022-08-27 NOTE — CM/SW NOTE
MARICARMEN received message from [place] at [employer]. [place] stated '[employer] approved. [facility]#[id number]'. MARICARMEN spoke with pt who confirmed [address] is her first choice. [facility] in [place]. MARICARMEN [employer] at [employer] and requested to submit for insurance auth. MARICARMEN will continue to follow.      NEGRO Garcia  Discharge Planner

## 2022-08-28 NOTE — PLAN OF CARE
Patient alert and oriented x4. VSS,on RA. Chronic N/T to both hands, no change from baseline. SCD's intermittently placed per pt tolerance. Tolerating regular diet, denies N/V. Voiding up in bathroom during the day, up x1 person SBA with walker. Aquacel dressing to right shoulder incision site, scant old drainage noted. Ice intermittently placed. Plan: discharge to [facility], [facility] [place], pending insurance auth.

## 2022-08-28 NOTE — CM/SW NOTE
Contacted [facility] to determine status of insurance auth. Auth still pending at this time. / to remain available for support and/or discharge planning.      [facility] [address]    Yahaira Munguia [facility]  Discharge Planner  [phone]

## 2022-08-28 NOTE — PLAN OF CARE
A&O x4. VSS on RA. . R shoulder pain well controlled with PO oxy prn. Up with SBA with walker, walking halls this evening, tolerated well. Aquacel drssofya C/D/I, ice pack applied. Sling in place. Voids freely to bathroom. Reviewed POC, pain management, IS use, and fall precautions with pt. Bed alarm on w/bed in lowest position. Pt reminded to use call light. Verbalized understanding. Plan to dc to [facility] when cleared.

## 2022-08-29 ENCOUNTER — APPOINTMENT (OUTPATIENT)
Dept: ULTRASOUND IMAGING | Facility: HOSPITAL | Age: 62
End: 2022-08-29
Attending: HOSPITALIST
Payer: COMMERCIAL

## 2022-08-29 PROCEDURE — 97110 THERAPEUTIC EXERCISES: CPT

## 2022-08-29 PROCEDURE — 93971 EXTREMITY STUDY: CPT | Performed by: HOSPITALIST

## 2022-08-29 PROCEDURE — 97530 THERAPEUTIC ACTIVITIES: CPT

## 2022-08-29 PROCEDURE — 97116 GAIT TRAINING THERAPY: CPT

## 2022-08-29 RX ORDER — HYDROCODONE BITARTRATE AND ACETAMINOPHEN 10; 325 MG/1; MG/1
1 TABLET ORAL EVERY 6 HOURS PRN
Qty: 20 TABLET | Refills: 0 | Status: SHIPPED | OUTPATIENT
Start: 2022-08-29

## 2022-08-29 NOTE — PLAN OF CARE
Pt A&O. On room air. Encouraged use of incentive spirometer and ankle pump exercises every hour while awake. Scds to BLE. Tolerating diet with good appetite. Last BM 8/25/22. Milk of magnesia given this AM. Voiding w/o difficulty. Pain managed with current medications. Pt reminded to \"call; don't fall\". Participating in therapy as ordered. Up with min assist using walker and gait belt. WBAT to RUE. Sling to RUE when not using walker. Ice packs as needed for pain/swelling. Pt plans to be dc'd to [facility] once insurance authorization obtained, hopefully today. Pt's spouse, Cal Nicole, will be here later today per pt. Need Rx for discharge to [facility].

## 2022-08-29 NOTE — PHYSICAL THERAPY NOTE
PHYSICAL THERAPY TREATMENT NOTE - INPATIENT    Room Number: 355/355-A     Session: 2    Number of Visits to Meet Established Goals: 4    Presenting Problem: s/p reverse TSA 8/25  Co-Morbidities : RA, hx ACDF C4-5 3/2/22, s/p L4-5 lami 10/26/21, neuropathy       History related to current admission: Patient is a 60year old female admitted on 8/25/2022 s/p reverse TSA 8/25    Admit 3/2-3/7/22: C4-C5 ACDF, dc'd acute  Admit 10/26-11/2/21: L4-L5 fusion, dc'd [facility]    ASSESSMENT     Patient continues to be limited in mobility due to pain in her R shoulder, decreased ROM, swelling on R hand, decreased dynamic balance, dec strength, and dec activity tolerance. Pt with history of RA. Pt needs cga/min assist for balance during gait, STS transfer with contact guard assist, chair stability and cues for proper technique due dec balance, pt tends to brace her legs against chair for balance during transition. Pt with min use of R UE with ambulation (per MD pt able to use R UE to propel RW). Pt will below her baseline for functional mobility. The AM-PAC '6-Clicks' Inpatient Basic Mobility Short Form was completed and this patient is demonstrating a ~51% degree of impairment in mobility. Research supports that patients with this level of impairment may benefit from DC recommendation to [facility]. DISCHARGE RECOMMENDATIONS  PT Discharge Recommendations: Sub-acute rehabilitation     PLAN  PT Treatment Plan: Bed mobility; Body mechanics; Coordination; Endurance; Energy conservation;Patient education; Family education;Gait training;Neuromuscular re-educate;Range of motion;Strengthening;Stoop training;Stair training;Transfer training;Balance training  Rehab Potential : Good  Frequency (Obs): 3-5x/week    CURRENT GOALS     Goal #1 Patient is able to demonstrate supine - sit EOB @ level: minimum assistance-Met with CGA  Upgrade goal to supervision      Goal #2 Patient is able to demonstrate transfers EOB to/from Chair/Wheelchair at assistance level: minimum assistance-Met with CGA  Upgrade goal to supervision     Goal #3 Patient is able to ambulate 75 feet with assist device: walker - rolling at assistance level: minimum assistance-Met with CGA  Upgrade goal to supervision with 150ft with RW     Goal #4    Goal #5    Goal #6    Goal Comments: Goals established on 2022 all goals ongoing       SUBJECTIVE  *\"I was in such pain that I just couldn't find good place for my arm\"    OBJECTIVE  Precautions: Limb alert - right (sling for comfort)    WEIGHT BEARING RESTRICTION  Weight Bearing Restriction: R upper extremity  R Upper Extremity: Weight Bearing as Tolerated             PAIN ASSESSMENT   Ratin  Location: R bicep  Management Techniques: Activity promotion;Relaxation;Repositioning    BALANCE                                                                                                                       Static Sitting: Good  Dynamic Sitting: Good           Static Standing: Poor +  Dynamic Standing: Poor    ACTIVITY TOLERANCE      fair  AM-PAC '6-Clicks' INPATIENT SHORT FORM - BASIC MOBILITY  How much difficulty does the patient currently have. .. Patient Difficulty: Turning over in bed (including adjusting bedclothes, sheets and blankets)?: A Little   Patient Difficulty: Sitting down on and standing up from a chair with arms (e.g., wheelchair, bedside commode, etc.): A Little   Patient Difficulty: Moving from lying on back to sitting on the side of the bed?: A Little   How much help from another person does the patient currently need. ..    Help from Another: Moving to and from a bed to a chair (including a wheelchair)?: A Little   Help from Another: Need to walk in hospital room?: A Little   Help from Another: Climbing 3-5 steps with a railing?: A Lot       AM-PAC Score:  Raw Score: 17   Approx Degree of Impairment: 50.57%   Standardized Score (AM-PAC Scale): 42.13   CMS Modifier (G-Code): CK    FUNCTIONAL ABILITY STATUS  Gait Assessment   Functional Mobility/Gait Assessment  Gait Assistance: Minimum assistance  Distance (ft): 70 (needed to sit due to inceased pain in her shoulder. )  Assistive Device: Rolling walker (70' with RW, 25' with Platform walker)  Pattern:  (step to pattern, ataxic pattern, flat feet)    Skilled Therapy Provided    Bed Mobility:  Min assist for supine to sit. Transfer Mobility:  Sit<>Stand: CGA   Stand<>Sit: CGA   Gait: cga to min assist with RW 70', sudden need of chair to rest due to significant increase in R shoulder pain. Pt rested for 8 min, platform attached to the RW, pt amb additional 25' with platform walker and then wc ride to return back to her room. Gait pattern: pt shows step to pattern,ataxic pattern on RLE, arely flat feet, wide ROS, uneven step length and ROS, tendency to stand far from the walker, cues needed to keep RW in close proximity. Initially, pt unable to tolerate sitting position, chair reclined, arm adjusted on pillows and blanket roll and pt seen after half hour. Educated on arm elevation, activity recommendation, proper use of platform walker without too much wb on RUE, surgical ROM restriction education. THERAPEUTIC EXERCISES  Lower Extremity Knee extension  Toe raises     Upper Extremity  - open/close and Wrist flex/ext   Gentle STM on hand to reduce edema. Position Sitting     Repetitions   10   Sets  10     Patient End of Session: Up in chair;Needs met;Call light within reach;RN aware of session/findings; Ice applied

## 2022-08-29 NOTE — CM/SW NOTE
Contacted [facility] - insurance auth pending. Updates sent via [address]. Await insurance auth for discharge. / to remain available for support and/or discharge planning.      [facility] [address]    Batsheva Pinto [facility]  Discharge Planner  [phone]

## 2022-08-30 VITALS
BODY MASS INDEX: 28.76 KG/M2 | HEART RATE: 79 BPM | WEIGHT: 156.31 LBS | DIASTOLIC BLOOD PRESSURE: 64 MMHG | HEIGHT: 62 IN | SYSTOLIC BLOOD PRESSURE: 135 MMHG | TEMPERATURE: 98 F | RESPIRATION RATE: 18 BRPM | OXYGEN SATURATION: 95 %

## 2022-08-30 NOTE — CM/SW NOTE
Contacted [facility] - insurance auth still pending. Await insurance auth for discharge. / to remain available for support and/or discharge planning. [facility]  [phone]    [facility]  Discharge Planner  [phone]      Addendum:  Informed by [facility] that they have received insurance auth and can accept pt for admission today. They requested 2pm DC. [facility] transport scheduled for 2pm.  PCS form completed and available for RN to print. Updated pt's RN.

## 2022-08-30 NOTE — DISCHARGE SUMMARY
Discharge Summary  Patient ID:  Narinder Portillo  [id number]  60year old  1/20/1960    Admit date: 8/25/2022    Discharge date and time: 8-30-22    Attending Physician: No att. providers found     Reason for admission: R shoulder cuff tear arthropathy for reverse TSA    Discharge Diagnoses: RIGHT ROTATOR CUFF TEAR ARTHROPATHY  PRIMARY OSTEOARTHRITIS OF LEFT KNEE    Discharged Condition: fair    Hospital Course: She underwent R reverse TSA on the date of admission. Hospital course was uneventful as the patient underwent PT/OT and waited for acceptance at hospitals 93: hospitalist    Diagnostics: x-rays, ultrasound, labs    Procedures: R shoulder reverse TSA    Disposition: [facility]    Patient Instructions:   Discharge Medication List as of 8/30/2022 12:41 PM    START taking these medications    HYDROcodone-acetaminophen (NORCO)  MG Oral Tab  Take 1 tablet by mouth every 6 (six) hours as needed for Pain., Print Script, Disp-20 tablet, R-0      CONTINUE these medications which have NOT CHANGED    TURMERIC OR  Take 3 tablets by mouth daily. , Historical    omega-3 fatty acids 1000 MG Oral Cap  Take 1,000 mg by mouth daily. , Historical    hydroxychloroquine 200 MG Oral Tab  Take 200 mg by mouth 2 (two) times daily. , Historical    atorvastatin 20 MG Oral Tab  Take 20 mg by mouth nightly., Historical    gabapentin 300 MG Oral Cap  Take 1 capsule (300 mg total) by mouth 3 (three) times daily. , Normal, Disp-270 capsule, R-3    Calcium Citrate-Vitamin D (CALCIUM CITRATE + D3 OR)  Take 1 tablet by mouth daily. , Historical    BIOTIN OR  Take 1 tablet by mouth daily. , Historical    Ascorbic Acid (VITAMIN C OR)  Take 1 tablet by mouth daily. , Historical    loratadine 10 MG Oral Tab  Take 10 mg by mouth daily. , Historical    Upadacitinib ER (RINVOQ) 15 MG Oral Tablet 24 Hr  Take 15 mg by mouth daily. , Normal, Disp-30 tablet, R-3    vitamin E 400 UNITS Oral Cap  Take 400 Units by mouth daily. , Historical    Lidocaine HCl 4 % External Cream  Apply 1 Application topically daily as needed., Historical    aspirin 81 MG Oral Tab EC  Take 81 mg by mouth daily. , Historical        Activity: no heavy lifting, pushing, pulling with the implant side for 2 months  Diet: regular diet  Wound Care: reinforce dressing PRN  Weight Bearing:  WBAT      Follow-up with Carmen Newby MD in 2 weeks.   Follow-up with labs: PT/INR with the medical physician that followed you in the hospital.      Signed:  Carmen Newby MD  8/30/2022  3:25 PM

## 2022-08-30 NOTE — PROGRESS NOTES
Patient cleared by all MD's for discharge to [facility]. IV removed. Norco script sent with paperwork with transport.  Patient discharging to the [address] [place] [address] Road via [facility]

## 2022-08-30 NOTE — PLAN OF CARE
A&Ox4. VSS. On room air. SCDs on BLE. Ankle pumps encouraged. Tolerating diet. Last BM 8/25 - received Milk of Mag this morning, declining suppository at this time. Voiding freely. Pain controlled with PO medication. Aquacel to right shoulder C/D/I. Ambulating with min assist with walker and gait belt. Plan is to dc to [facility], insurance auth pending. Patient updated on plan of care. Safety precautions in place. Call light within reach. Will continue to monitor.

## 2022-08-30 NOTE — PLAN OF CARE
Patient A/O x4, VSS on RA, c/o mod-severe pain. Oxy PRN. WBAT to RUE, elevated to reduce swelling. N/T to BUE at baseline. Voiding freely, last BM 8/25-miralax given tonight. Plan for dc to [facility] . Safety measures in place.

## 2023-02-27 NOTE — CM/SW NOTE
From: Cherie Bennett  To: Liana Atkinson  Sent: 2/25/2023 7:40 AM CST  Subject: Adderall/different Medication     I don’t feel comfortable asking around different pharmacies for my medication and prescription. Could we possibly move it to the dosage amount that I had previously? Or could my medication be changed to Intuniv or Vyvvamind? The thing is I run out of my current medication on Wednesday (March 1st). Thank you in advance for your help!   Spoke with Jay Esposito from [place] [address] who stated pt is accepted pending insurance approval.  Pt had ins policy that termed on 7/04. New insurance card in [address], however policy reflecting only coverage for pt's spouse. Coni from [place] [address] met with pt who stated she is active on her spouse's insurance as of 3/1. Pt/spouse will contact spouse's HR to ensure pt added to policy on 3/1 as expected and that this is reflected when ins is contacted for auth. / to remain available for support and/or discharge planning. Stanton Wang [facility]  Discharge Planner  [phone]    Addendum:  Met with pt to discuss DC planning as above. Pt stated her  is in contact with their workplace  for proof that pt has been added to [employer]. Discussed that insurance auth for [facility] will be needed and cannot be initiated until policy shows coverage for pt. [address] [facility] [address] [facility] and insurance verifiers also attempting to assist.  Pt expressed frustration, but stated she does not feel safe to discharge to home. Spoke with Stiven Mcgrath from PT who will work with pt today. OT worked with pt earlier today and she does feel acute rehab is still most appropriate DC plan for patient at this time. / to remain available for support and/or discharge planning. Statement Selected

## 2023-03-16 RX ORDER — GABAPENTIN 300 MG/1
300 CAPSULE ORAL 3 TIMES DAILY
Status: ON HOLD | COMMUNITY
Start: 2022-05-18

## 2023-03-21 ENCOUNTER — LABORATORY ENCOUNTER (OUTPATIENT)
Dept: LAB | Age: 63
End: 2023-03-21
Attending: ORTHOPAEDIC SURGERY
Payer: COMMERCIAL

## 2023-03-21 ENCOUNTER — EKG ENCOUNTER (OUTPATIENT)
Dept: LAB | Age: 63
End: 2023-03-21
Attending: ORTHOPAEDIC SURGERY
Payer: COMMERCIAL

## 2023-03-21 DIAGNOSIS — Z01.818 PRE-OP TESTING: ICD-10-CM

## 2023-03-21 LAB
ALBUMIN SERPL-MCNC: 4.3 G/DL (ref 3.4–5)
ALBUMIN/GLOB SERPL: 1.2 {RATIO} (ref 1–2)
ALP LIVER SERPL-CCNC: 58 U/L
ALT SERPL-CCNC: 35 U/L
ANION GAP SERPL CALC-SCNC: 3 MMOL/L (ref 0–18)
ANTIBODY SCREEN: NEGATIVE
AST SERPL-CCNC: 29 U/L (ref 15–37)
ATRIAL RATE: 60 BPM
BASOPHILS # BLD AUTO: 0.04 X10(3) UL (ref 0–0.2)
BASOPHILS NFR BLD AUTO: 0.6 %
BILIRUB SERPL-MCNC: 0.3 MG/DL (ref 0.1–2)
BUN BLD-MCNC: 13 MG/DL (ref 7–18)
CALCIUM BLD-MCNC: 9.5 MG/DL (ref 8.5–10.1)
CHLORIDE SERPL-SCNC: 102 MMOL/L (ref 98–112)
CO2 SERPL-SCNC: 31 MMOL/L (ref 21–32)
CREAT BLD-MCNC: 0.62 MG/DL
EOSINOPHIL # BLD AUTO: 0.29 X10(3) UL (ref 0–0.7)
EOSINOPHIL NFR BLD AUTO: 4.3 %
ERYTHROCYTE [DISTWIDTH] IN BLOOD BY AUTOMATED COUNT: 13.1 %
FASTING STATUS PATIENT QL REPORTED: YES
GFR SERPLBLD BASED ON 1.73 SQ M-ARVRAT: 99 ML/MIN/1.73M2 (ref 60–?)
GLOBULIN PLAS-MCNC: 3.5 G/DL (ref 2.8–4.4)
GLUCOSE BLD-MCNC: 93 MG/DL (ref 70–99)
HCT VFR BLD AUTO: 34.8 %
HGB BLD-MCNC: 11.4 G/DL
IMM GRANULOCYTES # BLD AUTO: 0.02 X10(3) UL (ref 0–1)
IMM GRANULOCYTES NFR BLD: 0.3 %
LYMPHOCYTES # BLD AUTO: 1.75 X10(3) UL (ref 1–4)
LYMPHOCYTES NFR BLD AUTO: 26 %
MCH RBC QN AUTO: 31 PG (ref 26–34)
MCHC RBC AUTO-ENTMCNC: 32.8 G/DL (ref 31–37)
MCV RBC AUTO: 94.6 FL
MONOCYTES # BLD AUTO: 0.82 X10(3) UL (ref 0.1–1)
MONOCYTES NFR BLD AUTO: 12.2 %
NEUTROPHILS # BLD AUTO: 3.82 X10 (3) UL (ref 1.5–7.7)
NEUTROPHILS # BLD AUTO: 3.82 X10(3) UL (ref 1.5–7.7)
NEUTROPHILS NFR BLD AUTO: 56.6 %
OSMOLALITY SERPL CALC.SUM OF ELEC: 282 MOSM/KG (ref 275–295)
P AXIS: 20 DEGREES
P-R INTERVAL: 186 MS
PLATELET # BLD AUTO: 291 10(3)UL (ref 150–450)
POTASSIUM SERPL-SCNC: 4.4 MMOL/L (ref 3.5–5.1)
PROT SERPL-MCNC: 7.8 G/DL (ref 6.4–8.2)
Q-T INTERVAL: 422 MS
QRS DURATION: 82 MS
QTC CALCULATION (BEZET): 422 MS
R AXIS: 34 DEGREES
RBC # BLD AUTO: 3.68 X10(6)UL
RH BLOOD TYPE: NEGATIVE
SODIUM SERPL-SCNC: 136 MMOL/L (ref 136–145)
T AXIS: 34 DEGREES
VENTRICULAR RATE: 60 BPM
WBC # BLD AUTO: 6.7 X10(3) UL (ref 4–11)

## 2023-03-21 PROCEDURE — 86900 BLOOD TYPING SEROLOGIC ABO: CPT

## 2023-03-21 PROCEDURE — 86850 RBC ANTIBODY SCREEN: CPT

## 2023-03-21 PROCEDURE — 87081 CULTURE SCREEN ONLY: CPT

## 2023-03-21 PROCEDURE — 80053 COMPREHEN METABOLIC PANEL: CPT

## 2023-03-21 PROCEDURE — 85025 COMPLETE CBC W/AUTO DIFF WBC: CPT

## 2023-03-21 PROCEDURE — 93005 ELECTROCARDIOGRAM TRACING: CPT

## 2023-03-21 PROCEDURE — 86901 BLOOD TYPING SEROLOGIC RH(D): CPT

## 2023-03-21 PROCEDURE — 93010 ELECTROCARDIOGRAM REPORT: CPT | Performed by: INTERNAL MEDICINE

## 2023-04-07 ENCOUNTER — LAB ENCOUNTER (OUTPATIENT)
Dept: LAB | Age: 63
End: 2023-04-07
Attending: ORTHOPAEDIC SURGERY
Payer: COMMERCIAL

## 2023-04-07 DIAGNOSIS — Z01.818 PRE-OP TESTING: ICD-10-CM

## 2023-04-08 LAB — SARS-COV-2 RNA RESP QL NAA+PROBE: NOT DETECTED

## 2023-04-10 ENCOUNTER — ANESTHESIA (OUTPATIENT)
Dept: SURGERY | Facility: HOSPITAL | Age: 63
End: 2023-04-10
Payer: COMMERCIAL

## 2023-04-10 ENCOUNTER — APPOINTMENT (OUTPATIENT)
Dept: GENERAL RADIOLOGY | Facility: HOSPITAL | Age: 63
End: 2023-04-10
Attending: PHYSICIAN ASSISTANT
Payer: COMMERCIAL

## 2023-04-10 ENCOUNTER — ANESTHESIA EVENT (OUTPATIENT)
Dept: SURGERY | Facility: HOSPITAL | Age: 63
End: 2023-04-10
Payer: COMMERCIAL

## 2023-04-10 ENCOUNTER — HOSPITAL ENCOUNTER (OUTPATIENT)
Facility: HOSPITAL | Age: 63
Discharge: HOME HEALTH | End: 2023-04-12
Attending: ORTHOPAEDIC SURGERY | Admitting: ORTHOPAEDIC SURGERY
Payer: COMMERCIAL

## 2023-04-10 DIAGNOSIS — Z01.818 PRE-OP TESTING: Primary | ICD-10-CM

## 2023-04-10 DIAGNOSIS — M17.12 OSTEOARTHRITIS OF LEFT KNEE: ICD-10-CM

## 2023-04-10 PROCEDURE — 73560 X-RAY EXAM OF KNEE 1 OR 2: CPT | Performed by: PHYSICIAN ASSISTANT

## 2023-04-10 PROCEDURE — 76942 ECHO GUIDE FOR BIOPSY: CPT | Performed by: ANESTHESIOLOGY

## 2023-04-10 PROCEDURE — 0SRD0J9 REPLACEMENT OF LEFT KNEE JOINT WITH SYNTHETIC SUBSTITUTE, CEMENTED, OPEN APPROACH: ICD-10-PCS | Performed by: ORTHOPAEDIC SURGERY

## 2023-04-10 PROCEDURE — 88305 TISSUE EXAM BY PATHOLOGIST: CPT | Performed by: ORTHOPAEDIC SURGERY

## 2023-04-10 PROCEDURE — 88311 DECALCIFY TISSUE: CPT | Performed by: ORTHOPAEDIC SURGERY

## 2023-04-10 DEVICE — ATTUNE PINNING SYSTEM
Type: IMPLANTABLE DEVICE | Site: KNEE | Status: FUNCTIONAL
Brand: ATTUNE

## 2023-04-10 DEVICE — ATTUNE PATELLA MEDIALIZED ANATOMIC 32MM CEMENTED AOX
Type: IMPLANTABLE DEVICE | Site: KNEE | Status: FUNCTIONAL
Brand: ATTUNE

## 2023-04-10 DEVICE — SMARTSET GMV HIGH PERFORMANCE GENTAMICIN MEDIUM VISCOSITY BONE CEMENT 40G
Type: IMPLANTABLE DEVICE | Site: KNEE | Status: FUNCTIONAL
Brand: SMARTSET

## 2023-04-10 DEVICE — ATTUNE KNEE SYSTEM TIBIAL BASE ROTATING PLATFORM SIZE 4 CEMENTED
Type: IMPLANTABLE DEVICE | Site: KNEE | Status: FUNCTIONAL
Brand: ATTUNE

## 2023-04-10 DEVICE — ATTUNE KNEE SYSTEM FEMORAL POSTERIOR STABILIZED SIZE 4 LEFT CEMENTED
Type: IMPLANTABLE DEVICE | Site: KNEE | Status: FUNCTIONAL
Brand: ATTUNE

## 2023-04-10 DEVICE — ATTUNE KNEE SYSTEM TIBIAL INSERT ROTATING PLATFORM POSTERIOR STABILIZED 4 6MM AOX
Type: IMPLANTABLE DEVICE | Site: KNEE | Status: FUNCTIONAL
Brand: ATTUNE

## 2023-04-10 RX ORDER — SODIUM PHOSPHATE, DIBASIC AND SODIUM PHOSPHATE, MONOBASIC 7; 19 G/133ML; G/133ML
1 ENEMA RECTAL ONCE AS NEEDED
Status: DISCONTINUED | OUTPATIENT
Start: 2023-04-10 | End: 2023-04-12

## 2023-04-10 RX ORDER — LIDOCAINE HYDROCHLORIDE 10 MG/ML
INJECTION, SOLUTION EPIDURAL; INFILTRATION; INTRACAUDAL; PERINEURAL AS NEEDED
Status: DISCONTINUED | OUTPATIENT
Start: 2023-04-10 | End: 2023-04-10 | Stop reason: SURG

## 2023-04-10 RX ORDER — PROCHLORPERAZINE EDISYLATE 5 MG/ML
5 INJECTION INTRAMUSCULAR; INTRAVENOUS EVERY 8 HOURS PRN
Status: DISCONTINUED | OUTPATIENT
Start: 2023-04-10 | End: 2023-04-10 | Stop reason: HOSPADM

## 2023-04-10 RX ORDER — OXYCODONE HYDROCHLORIDE 5 MG/1
5 TABLET ORAL EVERY 4 HOURS PRN
Status: DISCONTINUED | OUTPATIENT
Start: 2023-04-10 | End: 2023-04-12

## 2023-04-10 RX ORDER — DIPHENHYDRAMINE HYDROCHLORIDE 50 MG/ML
12.5 INJECTION INTRAMUSCULAR; INTRAVENOUS AS NEEDED
Status: DISCONTINUED | OUTPATIENT
Start: 2023-04-10 | End: 2023-04-10 | Stop reason: HOSPADM

## 2023-04-10 RX ORDER — TRAMADOL HYDROCHLORIDE 50 MG/1
1 TABLET ORAL EVERY 6 HOURS PRN
Status: ON HOLD | COMMUNITY
Start: 2023-04-06

## 2023-04-10 RX ORDER — ACETAMINOPHEN 500 MG
1000 TABLET ORAL ONCE AS NEEDED
Status: DISCONTINUED | OUTPATIENT
Start: 2023-04-10 | End: 2023-04-10 | Stop reason: HOSPADM

## 2023-04-10 RX ORDER — NALOXONE HYDROCHLORIDE 0.4 MG/ML
80 INJECTION, SOLUTION INTRAMUSCULAR; INTRAVENOUS; SUBCUTANEOUS AS NEEDED
Status: DISCONTINUED | OUTPATIENT
Start: 2023-04-10 | End: 2023-04-10 | Stop reason: HOSPADM

## 2023-04-10 RX ORDER — KETAMINE HYDROCHLORIDE 50 MG/ML
INJECTION, SOLUTION, CONCENTRATE INTRAMUSCULAR; INTRAVENOUS AS NEEDED
Status: DISCONTINUED | OUTPATIENT
Start: 2023-04-10 | End: 2023-04-10 | Stop reason: SURG

## 2023-04-10 RX ORDER — TRAMADOL HYDROCHLORIDE 50 MG/1
50 TABLET ORAL EVERY 6 HOURS SCHEDULED
Status: DISCONTINUED | OUTPATIENT
Start: 2023-04-10 | End: 2023-04-12

## 2023-04-10 RX ORDER — BISACODYL 10 MG
10 SUPPOSITORY, RECTAL RECTAL
Status: DISCONTINUED | OUTPATIENT
Start: 2023-04-10 | End: 2023-04-12

## 2023-04-10 RX ORDER — CEFAZOLIN SODIUM/WATER 2 G/20 ML
SYRINGE (ML) INTRAVENOUS
Status: DISPENSED
Start: 2023-04-10 | End: 2023-04-10

## 2023-04-10 RX ORDER — DOCUSATE SODIUM 100 MG/1
100 CAPSULE, LIQUID FILLED ORAL 2 TIMES DAILY
Status: DISCONTINUED | OUTPATIENT
Start: 2023-04-10 | End: 2023-04-12

## 2023-04-10 RX ORDER — CELECOXIB 200 MG/1
200 CAPSULE ORAL DAILY
Status: ON HOLD | COMMUNITY
Start: 2023-04-06

## 2023-04-10 RX ORDER — DIPHENHYDRAMINE HYDROCHLORIDE 50 MG/ML
25 INJECTION INTRAMUSCULAR; INTRAVENOUS ONCE AS NEEDED
Status: ACTIVE | OUTPATIENT
Start: 2023-04-10 | End: 2023-04-10

## 2023-04-10 RX ORDER — POLYETHYLENE GLYCOL 3350 17 G/17G
17 POWDER, FOR SOLUTION ORAL DAILY PRN
Status: DISCONTINUED | OUTPATIENT
Start: 2023-04-10 | End: 2023-04-12

## 2023-04-10 RX ORDER — ONDANSETRON 2 MG/ML
INJECTION INTRAMUSCULAR; INTRAVENOUS AS NEEDED
Status: DISCONTINUED | OUTPATIENT
Start: 2023-04-10 | End: 2023-04-10 | Stop reason: SURG

## 2023-04-10 RX ORDER — KETOROLAC TROMETHAMINE 30 MG/ML
15 INJECTION, SOLUTION INTRAMUSCULAR; INTRAVENOUS EVERY 6 HOURS
Status: COMPLETED | OUTPATIENT
Start: 2023-04-10 | End: 2023-04-11

## 2023-04-10 RX ORDER — HYDROMORPHONE HYDROCHLORIDE 1 MG/ML
0.4 INJECTION, SOLUTION INTRAMUSCULAR; INTRAVENOUS; SUBCUTANEOUS EVERY 5 MIN PRN
Status: DISCONTINUED | OUTPATIENT
Start: 2023-04-10 | End: 2023-04-10 | Stop reason: HOSPADM

## 2023-04-10 RX ORDER — ACETAMINOPHEN 500 MG
1000 TABLET ORAL EVERY 6 HOURS PRN
Status: ON HOLD | COMMUNITY

## 2023-04-10 RX ORDER — DIPHENHYDRAMINE HYDROCHLORIDE 50 MG/ML
12.5 INJECTION INTRAMUSCULAR; INTRAVENOUS EVERY 4 HOURS PRN
Status: DISCONTINUED | OUTPATIENT
Start: 2023-04-10 | End: 2023-04-12

## 2023-04-10 RX ORDER — ACETAMINOPHEN 500 MG
1000 TABLET ORAL ONCE
Status: DISCONTINUED | OUTPATIENT
Start: 2023-04-10 | End: 2023-04-10 | Stop reason: HOSPADM

## 2023-04-10 RX ORDER — ROCURONIUM BROMIDE 10 MG/ML
INJECTION, SOLUTION INTRAVENOUS AS NEEDED
Status: DISCONTINUED | OUTPATIENT
Start: 2023-04-10 | End: 2023-04-10 | Stop reason: SURG

## 2023-04-10 RX ORDER — ACETAMINOPHEN 325 MG/1
650 TABLET ORAL ONCE
Status: COMPLETED | OUTPATIENT
Start: 2023-04-10 | End: 2023-04-10

## 2023-04-10 RX ORDER — ACETAMINOPHEN 325 MG/1
650 TABLET ORAL 4 TIMES DAILY
Status: DISCONTINUED | OUTPATIENT
Start: 2023-04-10 | End: 2023-04-12

## 2023-04-10 RX ORDER — DEXAMETHASONE SODIUM PHOSPHATE 10 MG/ML
INJECTION, SOLUTION INTRAMUSCULAR; INTRAVENOUS AS NEEDED
Status: DISCONTINUED | OUTPATIENT
Start: 2023-04-10 | End: 2023-04-10 | Stop reason: SURG

## 2023-04-10 RX ORDER — SODIUM CHLORIDE, SODIUM LACTATE, POTASSIUM CHLORIDE, CALCIUM CHLORIDE 600; 310; 30; 20 MG/100ML; MG/100ML; MG/100ML; MG/100ML
INJECTION, SOLUTION INTRAVENOUS CONTINUOUS
Status: DISCONTINUED | OUTPATIENT
Start: 2023-04-10 | End: 2023-04-10 | Stop reason: HOSPADM

## 2023-04-10 RX ORDER — TRANEXAMIC ACID 10 MG/ML
INJECTION, SOLUTION INTRAVENOUS AS NEEDED
Status: DISCONTINUED | OUTPATIENT
Start: 2023-04-10 | End: 2023-04-10 | Stop reason: SURG

## 2023-04-10 RX ORDER — HYDROCODONE BITARTRATE AND ACETAMINOPHEN 5; 325 MG/1; MG/1
2 TABLET ORAL ONCE AS NEEDED
Status: DISCONTINUED | OUTPATIENT
Start: 2023-04-10 | End: 2023-04-10 | Stop reason: HOSPADM

## 2023-04-10 RX ORDER — FAMOTIDINE 20 MG/1
20 TABLET, FILM COATED ORAL 2 TIMES DAILY
Status: DISCONTINUED | OUTPATIENT
Start: 2023-04-10 | End: 2023-04-12

## 2023-04-10 RX ORDER — FAMOTIDINE 10 MG/ML
20 INJECTION, SOLUTION INTRAVENOUS 2 TIMES DAILY
Status: DISCONTINUED | OUTPATIENT
Start: 2023-04-10 | End: 2023-04-12

## 2023-04-10 RX ORDER — CEFAZOLIN SODIUM/WATER 2 G/20 ML
2 SYRINGE (ML) INTRAVENOUS ONCE
Status: COMPLETED | OUTPATIENT
Start: 2023-04-10 | End: 2023-04-10

## 2023-04-10 RX ORDER — HYDROMORPHONE HYDROCHLORIDE 1 MG/ML
INJECTION, SOLUTION INTRAMUSCULAR; INTRAVENOUS; SUBCUTANEOUS
Status: DISCONTINUED
Start: 2023-04-10 | End: 2023-04-10 | Stop reason: WASHOUT

## 2023-04-10 RX ORDER — HYDROXYCHLOROQUINE SULFATE 200 MG/1
200 TABLET, FILM COATED ORAL 2 TIMES DAILY
Status: DISCONTINUED | OUTPATIENT
Start: 2023-04-10 | End: 2023-04-12

## 2023-04-10 RX ORDER — CEFAZOLIN SODIUM/WATER 2 G/20 ML
2 SYRINGE (ML) INTRAVENOUS EVERY 8 HOURS
Status: COMPLETED | OUTPATIENT
Start: 2023-04-10 | End: 2023-04-11

## 2023-04-10 RX ORDER — GABAPENTIN 300 MG/1
300 CAPSULE ORAL 3 TIMES DAILY
Status: DISCONTINUED | OUTPATIENT
Start: 2023-04-10 | End: 2023-04-12

## 2023-04-10 RX ORDER — ACETAMINOPHEN 325 MG/1
TABLET ORAL
Status: COMPLETED
Start: 2023-04-10 | End: 2023-04-10

## 2023-04-10 RX ORDER — TIZANIDINE 2 MG/1
2 TABLET ORAL EVERY 6 HOURS PRN
Status: DISCONTINUED | OUTPATIENT
Start: 2023-04-10 | End: 2023-04-12

## 2023-04-10 RX ORDER — HYDROCODONE BITARTRATE AND ACETAMINOPHEN 5; 325 MG/1; MG/1
1 TABLET ORAL ONCE AS NEEDED
Status: DISCONTINUED | OUTPATIENT
Start: 2023-04-10 | End: 2023-04-10 | Stop reason: HOSPADM

## 2023-04-10 RX ORDER — ONDANSETRON 2 MG/ML
4 INJECTION INTRAMUSCULAR; INTRAVENOUS EVERY 6 HOURS PRN
Status: DISCONTINUED | OUTPATIENT
Start: 2023-04-10 | End: 2023-04-12

## 2023-04-10 RX ORDER — SENNOSIDES 8.6 MG
17.2 TABLET ORAL NIGHTLY
Status: DISCONTINUED | OUTPATIENT
Start: 2023-04-10 | End: 2023-04-12

## 2023-04-10 RX ORDER — PROCHLORPERAZINE EDISYLATE 5 MG/ML
5 INJECTION INTRAMUSCULAR; INTRAVENOUS EVERY 8 HOURS PRN
Status: DISCONTINUED | OUTPATIENT
Start: 2023-04-10 | End: 2023-04-12

## 2023-04-10 RX ORDER — ATORVASTATIN CALCIUM 20 MG/1
20 TABLET, FILM COATED ORAL NIGHTLY
Status: DISCONTINUED | OUTPATIENT
Start: 2023-04-10 | End: 2023-04-12

## 2023-04-10 RX ORDER — MEPERIDINE HYDROCHLORIDE 25 MG/ML
12.5 INJECTION INTRAMUSCULAR; INTRAVENOUS; SUBCUTANEOUS AS NEEDED
Status: DISCONTINUED | OUTPATIENT
Start: 2023-04-10 | End: 2023-04-10 | Stop reason: HOSPADM

## 2023-04-10 RX ORDER — DIPHENHYDRAMINE HCL 25 MG
25 CAPSULE ORAL EVERY 4 HOURS PRN
Status: DISCONTINUED | OUTPATIENT
Start: 2023-04-10 | End: 2023-04-12

## 2023-04-10 RX ORDER — DEXAMETHASONE SODIUM PHOSPHATE 10 MG/ML
8 INJECTION, SOLUTION INTRAMUSCULAR; INTRAVENOUS ONCE
Status: COMPLETED | OUTPATIENT
Start: 2023-04-11 | End: 2023-04-11

## 2023-04-10 RX ORDER — PSEUDOEPHEDRINE HCL 30 MG
100 TABLET ORAL 2 TIMES DAILY
Status: ON HOLD | COMMUNITY
Start: 2023-04-06

## 2023-04-10 RX ORDER — OXYCODONE HYDROCHLORIDE 5 MG/1
1 TABLET ORAL EVERY 6 HOURS PRN
Status: ON HOLD | COMMUNITY
Start: 2023-04-06

## 2023-04-10 RX ORDER — SCOLOPAMINE TRANSDERMAL SYSTEM 1 MG/1
1 PATCH, EXTENDED RELEASE TRANSDERMAL ONCE
Status: DISCONTINUED | OUTPATIENT
Start: 2023-04-10 | End: 2023-04-10

## 2023-04-10 RX ORDER — HYDROMORPHONE HYDROCHLORIDE 1 MG/ML
0.2 INJECTION, SOLUTION INTRAMUSCULAR; INTRAVENOUS; SUBCUTANEOUS EVERY 5 MIN PRN
Status: DISCONTINUED | OUTPATIENT
Start: 2023-04-10 | End: 2023-04-10 | Stop reason: HOSPADM

## 2023-04-10 RX ORDER — LABETALOL HYDROCHLORIDE 5 MG/ML
5 INJECTION, SOLUTION INTRAVENOUS EVERY 5 MIN PRN
Status: DISCONTINUED | OUTPATIENT
Start: 2023-04-10 | End: 2023-04-10 | Stop reason: HOSPADM

## 2023-04-10 RX ORDER — ONDANSETRON 2 MG/ML
4 INJECTION INTRAMUSCULAR; INTRAVENOUS EVERY 6 HOURS PRN
Status: DISCONTINUED | OUTPATIENT
Start: 2023-04-10 | End: 2023-04-10 | Stop reason: HOSPADM

## 2023-04-10 RX ORDER — HYDROMORPHONE HYDROCHLORIDE 1 MG/ML
0.4 INJECTION, SOLUTION INTRAMUSCULAR; INTRAVENOUS; SUBCUTANEOUS EVERY 2 HOUR PRN
Status: DISCONTINUED | OUTPATIENT
Start: 2023-04-10 | End: 2023-04-12

## 2023-04-10 RX ORDER — CEPHALEXIN 500 MG/1
1 CAPSULE ORAL 3 TIMES DAILY
Status: ON HOLD | COMMUNITY
Start: 2023-04-06

## 2023-04-10 RX ORDER — DEXAMETHASONE SODIUM PHOSPHATE 4 MG/ML
VIAL (ML) INJECTION AS NEEDED
Status: DISCONTINUED | OUTPATIENT
Start: 2023-04-10 | End: 2023-04-10 | Stop reason: SURG

## 2023-04-10 RX ORDER — SODIUM CHLORIDE, SODIUM LACTATE, POTASSIUM CHLORIDE, CALCIUM CHLORIDE 600; 310; 30; 20 MG/100ML; MG/100ML; MG/100ML; MG/100ML
INJECTION, SOLUTION INTRAVENOUS CONTINUOUS
Status: DISCONTINUED | OUTPATIENT
Start: 2023-04-10 | End: 2023-04-12

## 2023-04-10 RX ORDER — HYDROMORPHONE HYDROCHLORIDE 1 MG/ML
0.8 INJECTION, SOLUTION INTRAMUSCULAR; INTRAVENOUS; SUBCUTANEOUS EVERY 2 HOUR PRN
Status: DISCONTINUED | OUTPATIENT
Start: 2023-04-10 | End: 2023-04-12

## 2023-04-10 RX ORDER — OXYCODONE HYDROCHLORIDE 10 MG/1
10 TABLET ORAL EVERY 4 HOURS PRN
Status: DISCONTINUED | OUTPATIENT
Start: 2023-04-10 | End: 2023-04-12

## 2023-04-10 RX ORDER — SCOLOPAMINE TRANSDERMAL SYSTEM 1 MG/1
1 PATCH, EXTENDED RELEASE TRANSDERMAL ONCE
Status: DISCONTINUED | OUTPATIENT
Start: 2023-04-10 | End: 2023-04-12

## 2023-04-10 RX ORDER — HYDROMORPHONE HYDROCHLORIDE 1 MG/ML
0.6 INJECTION, SOLUTION INTRAMUSCULAR; INTRAVENOUS; SUBCUTANEOUS EVERY 5 MIN PRN
Status: DISCONTINUED | OUTPATIENT
Start: 2023-04-10 | End: 2023-04-10 | Stop reason: HOSPADM

## 2023-04-10 RX ADMIN — SODIUM CHLORIDE, SODIUM LACTATE, POTASSIUM CHLORIDE, CALCIUM CHLORIDE: 600; 310; 30; 20 INJECTION, SOLUTION INTRAVENOUS at 13:26:00

## 2023-04-10 RX ADMIN — SODIUM CHLORIDE, SODIUM LACTATE, POTASSIUM CHLORIDE, CALCIUM CHLORIDE: 600; 310; 30; 20 INJECTION, SOLUTION INTRAVENOUS at 13:04:00

## 2023-04-10 RX ADMIN — SODIUM CHLORIDE, SODIUM LACTATE, POTASSIUM CHLORIDE, CALCIUM CHLORIDE: 600; 310; 30; 20 INJECTION, SOLUTION INTRAVENOUS at 13:37:00

## 2023-04-10 RX ADMIN — SODIUM CHLORIDE, SODIUM LACTATE, POTASSIUM CHLORIDE, CALCIUM CHLORIDE: 600; 310; 30; 20 INJECTION, SOLUTION INTRAVENOUS at 14:41:00

## 2023-04-10 RX ADMIN — TRANEXAMIC ACID 1000 MG: 10 INJECTION, SOLUTION INTRAVENOUS at 13:15:00

## 2023-04-10 RX ADMIN — CEFAZOLIN SODIUM/WATER 2 G: 2 G/20 ML SYRINGE (ML) INTRAVENOUS at 13:15:00

## 2023-04-10 RX ADMIN — KETAMINE HYDROCHLORIDE 25 MG: 50 INJECTION, SOLUTION, CONCENTRATE INTRAMUSCULAR; INTRAVENOUS at 13:24:00

## 2023-04-10 RX ADMIN — ROCURONIUM BROMIDE 50 MG: 10 INJECTION, SOLUTION INTRAVENOUS at 13:08:00

## 2023-04-10 RX ADMIN — LIDOCAINE HYDROCHLORIDE 100 MG: 10 INJECTION, SOLUTION EPIDURAL; INFILTRATION; INTRACAUDAL; PERINEURAL at 13:08:00

## 2023-04-10 RX ADMIN — ONDANSETRON 4 MG: 2 INJECTION INTRAMUSCULAR; INTRAVENOUS at 13:16:00

## 2023-04-10 RX ADMIN — DEXAMETHASONE SODIUM PHOSPHATE 8 MG: 4 MG/ML VIAL (ML) INJECTION at 13:19:00

## 2023-04-10 RX ADMIN — DEXAMETHASONE SODIUM PHOSPHATE 2 MG: 10 INJECTION, SOLUTION INTRAMUSCULAR; INTRAVENOUS at 13:14:00

## 2023-04-10 NOTE — ANESTHESIA POSTPROCEDURE EVALUATION
[address] Patient Status:  Outpatient in a Bed   Age/Gender 58year old female MRN [id number]   Location [facility] Attending Echo Bonilla MD   Hosp Day # 0 PCP Constantino Vo MD       Anesthesia Post-op Note    LEFT TOTAL KNEE REPLACEMENT    Procedure Summary     Date: 04/10/23 Room / Location: [facility] MAIN OR 12 / [facility] MAIN OR    Anesthesia Start: 5750 Anesthesia Stop: 5639    Procedure: LEFT TOTAL KNEE REPLACEMENT (Left: Knee) Diagnosis: (LEFT KNEE OSTEOARTHRITIS)    Surgeons: Echo Bonilla MD Anesthesiologist: Luz Conroy MD    Anesthesia Type: general, regional ASA Status: 2          Anesthesia Type: general, regional    Vitals Value Taken Time   /83 04/10/23 1510   Temp 98.1 04/10/23 1511   Pulse 78 04/10/23 1510   Resp 8 04/10/23 1510   SpO2 99 % 04/10/23 1510   Vitals shown include unvalidated device data. Patient Location: PACU    Anesthesia Type: regional and general    Airway Patency: patent and extubated    Postop Pain Control: adequate    Mental Status: preanesthetic baseline    Nausea/Vomiting: none    Cardiopulmonary/Hydration status: stable euvolemic    Complications: no apparent anesthesia related complications    Postop vital signs: stable    Dental Exam: Unchanged from Preop    Patient to be discharged from PACU when criteria met.

## 2023-04-10 NOTE — ANESTHESIA PROCEDURE NOTES
Airway  Date/Time: 4/10/2023 1:08 PM  Urgency: elective    Airway not difficult    General Information and Staff    Patient location during procedure: OR  Anesthesiologist: Melodie Hung MD  Performed: anesthesiologist   Performed by: Melodie Hung MD  Authorized by: Melodie Hung MD      Indications and Patient Condition  Indications for airway management: anesthesia  Spontaneous Ventilation: absent  Sedation level: deep  Preoxygenated: yes  Patient position: sniffing  Mask difficulty assessment: 0 - not attempted    Final Airway Details  Final airway type: endotracheal airway      Successful airway: ETT  Cuffed: yes   Successful intubation technique: Video laryngoscopy  Facilitating devices/methods: intubating stylet  Endotracheal tube insertion site: oral  Blade: GlideScope  Blade size: #3  ETT size (mm): 6.5    Cormack-Lehane Classification: grade I - full view of glottis  Placement verified by: chest auscultation and capnometry   Cuff volume (mL): 9  Measured from: lips  ETT to lips (cm): 22  Number of attempts at approach: 1  Number of other approaches attempted: 0

## 2023-04-10 NOTE — PHYSICAL THERAPY NOTE
PT order received, chart reviewed. Pt just arriving to unit with transport. Will f/u for PT eval tomorrow morning.

## 2023-04-10 NOTE — ANESTHESIA PROCEDURE NOTES
Regional Block    Date/Time: 4/10/2023 1:12 PM    Performed by: May Thomson MD  Authorized by: May Thomson MD      General Information and Staff    Start Time:  4/10/2023 1:12 PM  End Time:  4/10/2023 1:14 PM  Anesthesiologist:  May Thomson MD  Performed by: Anesthesiologist  Patient Location:  OR      Site Identification: real time ultrasound guided and image stored and retrievable    Block site/laterality marked before start: site marked  Reason for Block: at surgeon's request and post-op pain management    Preanesthetic Checklist: 2 patient identifers, IV checked, site marked, risks and benefits discussed, monitors and equipment checked, pre-op evaluation, timeout performed, anesthesia consent, sterile technique used, no prohibitive neurological deficits and no local skin infection at insertion site      Procedure Details    Patient Position:   Prep: ChloraPrep    Monitoring:  Cardiac monitor, continuous pulse ox, blood pressure cuff and heart rate  Block Type: Adductor canal  Laterality:  Left  Injection Technique:  Single-shot    Needle    Needle Type:  Short-bevel and echogenic  Needle Gauge:  20 G  Needle Length:  100 mm  Needle Localization:  Ultrasound guidance  Reason for Ultrasound Use: appropriate spread of the medication was noted in real time and no ultrasound evidence of intravascular and/or intraneural injection            Assessment    Injection Assessment:  Good spread noted, negative resistance, negative aspiration for heme, incremental injection and low pressure  Heart Rate Change: No    - Patient tolerated block procedure well without evidence of immediate block related complications.      Medications  4/10/2023 1:12 PM      Additional Comments    Medication:  Ropivacaine 0.375% 20mL with 1:200,000 epi and PF decadron 2 mg

## 2023-04-10 NOTE — OPERATIVE REPORT
ANATOMIC ALIGNMENT TOTAL KNEE OPERATIVE REPORT:  DATE OF SURGERY: 4/10/2023    Alma Delia Blas       [id number]     1/20/1960    PREOP DX: LEFT  KNEE PRIMARY OSTEOARTHRITIS  POSTOP DX:LEFT KNEE PRIMARY OSTEOARTHRITIS  PROCEDURE: LEFT TOTAL KNEE REPLACEMENT (ANATOMIC ALIGNMENT)  SURGEON: Roberto Carlos Ellis MD  FIRST ASSIST: JOAQUIM SANDS PA-C  ANESTHESIA: SPINAL AND ADDUCTOR CANAL BLOCK  EBL: 50 CC  SPECIMEN: DISTAL FEMORAL AND PROXIMAL TIBIA CUT BONE  COMPLICATIONS: NONE  DRAIN: NONE  TT: 47 min. IMPLANT:  DEPUY ATTUNE RP PS FEMORAL SIZE  4 ,  RP TIBIA SIZE 4, RP PS POLY SIZE 6,  32 ANATOMIC PATELLA,  GMV CEMENT  PROCEDURE NOTE:  PATIENT WAS CORRECTLY IDENTIFIED AND OPERATIVE SITE WAS VERIFIED IN THE PREOPERATIVE HOLDING AREA. PATIENT WAS BROUGHT TO THE OR AND WAS PLACED IN SUPINE POSITION. PREOPERATIVE ANTIBIOTIC WAS GIVEN. NONOPERATIVE LEG HAD SCD APPLIED. ANESTHESIA WAS ADMINISTERED. ARMS WERE POSITIONED BY ANESTHESIA. OPERATIVE LEG WAS PREPPED AND DRAPED IN SURGICALLY STERILE AND STANDARD FASHION. TIMEOUT WAS DONE.  BLOOD WAS EXSANGUINATED. TOURNIQUET WAS INFLATED. ANTERIOR LINEAR INCISION WAS MADE. MEDIAL ARTHROTOMY WAS PERFORMED. LIMITED MEDIAL RELEASE WAS DONE. RETROPATELLAR FAT AND ANTERIOR FEMORAL FAT WERE REMOVED IN LIMITED FASHION. PATELLA WAS SUBLUXED LATERALLY. KNEE WAS FLEXED. OSTEOARTHRITIS WAS IDENTIFIED. ALL OSTEOPHYTES WERE REMOVED. ACL/PCL WERE REMOVED. DISTAL FEMORAL INTRAMEDULLARY DRILL WAS DONE. 9 mm DISTAL FEMORAL CUT WAS MADE BY USING SURFACE MATCHING ANATOMIC TECHNIQUE. PROXIMAL TIBIA WAS SUBLUXED ANTERIORLY. MEDIAL AND LATERAL MENISCI WERE REMOVED. PROXIMAL TIBIA CUT WAS MADE USING EXTRAMEDULLARY GUIDE TO MATCH THE DISTAL FEMORAL CUT IN PARALLEL FASHION. EXTENSION GAP WAS CHECKED USING AN EXTENSION SPACER. ATTENTION WAS TURNED BACK TO DISTAL FEMUR. KNEE WAS FLEXED. FEMUR WAS SIZED AT 4.    ANTERIOR-POSTERIOR CUT WAS MADE AT 0 DEG IN LINE WITH POSTERIOR FEMORAL CONDYLES. IT WAS A EQUAL CUT FROM BOTH POSTERIOR CONDYLES. FLEXION AND EXTENSION GAPS WERE CHECKED USING A SPACER BLOCK. GAPS WERE FOUND TO SATISFACTORY. FEMUR WAS FINISHED OFF WITH CHAMFER AND [address] CUTS IN USUAL FASHION. POSTERIOR FEMORAL OSTEOPHYTES WERE REMOVED. POSTERIOR CAPSULAR RELEASE WAS DONE CAREFULLY. PROXIMAL TIBIA WAS EXPOSED. TIBIA WAS SIZED at 4 AND ROTATION WAS SET USING MEDIAL 1/3 OF TIBIA TUBERCLE. TIBIA WAS PREPARED IN STANDARD FASHION. TRIAL COMPONENTS WERE PLACED. PATELLA WAS EVERTED AND THICKNESS MEASURED at 23 mm. PATELLAR CUT WAS MADE FREE HANDED FASHION. PATELLA WAS FINISHED. TRIAL COMPONENTS WERE INSERTED. KNEE WAS RANGED. GOOD FULL EXTENSION WAS ESTABLISHED AND FLEXION BEYOND BEYOND 120 DEG  KNEE WAS OBTAINED.  KNEE WAS STABLE TO VALGUS AND VARUS STRESS. PATELLA TRACKED NICELY. ALL THE TRIAL IMPLANTS WERE REMOVED. WOUND WAS IRRIGATED. LOCAL COCKTAIL WAS INFILTRATED CAREFULLY TO POSTERIOR CAPSULE, PERIOSTEUM, BOTH GUTTERS, AND SUPERFICIAL SOFT TISSUE. CEMENT WAS MIXED. CEMENT WAS CAREFULLY APPLIED TO BOTH BONE SURFACE AND IMPLANT SURFACE. FIRST TIBIA COMPONENT, THEN FEMORAL COMPONENT WERE APPLIED. EXCESS CEMENT WAS REMOVED. REAL POLY WAS INSERTED. KNEE WAS EXTENDED. PATELLAR COMPONENT WAS APPLIED. KNEE WAS HELD IN EXTENSION UNTIL CEMENT WAS SET. TOURNIQUET WAS DEFLATED. HEMOSTASIS WAS OBTAINED. IRRIGATION WAS DONE.    KNEE WAS RANGED AGAIN WITH GOOD STABILITY AND PATELLAR TRACKING. ANY LOOSE OR EXCESS CEMENT WAS REMOVED. ARTHROTOMY WAS CLOSED. SUBCUTANEOUS LAYER WAS CLOSED IN MULTIPLE LAYERS. SKIN WAS CLOSED. STERILE DRESSING WAS APPLIED. ALL COUNTS WERE CORRECT. PHYSICIAN ASSISTANT WAS NECESSARY FOR PATIENT POSITIONING, RETRACTION OF SOFT TISSUES, IMPLANT INSERTION, DISLOCATION AND REDUCTION OF THE KNEE JOINT, STABILITY TESTING, AND WOUND CLOSURE.   Deeanna Pallas, MD  4/10/2023

## 2023-04-10 NOTE — CM/SW NOTE
Pt is s/p L TKA. She has a pre-op plan with [facility]. Therapy evals pending at this time. Gurpreet Howell with [facility] will continue to follow. CM/SW to remain available for discharge planning.     Chirag Arce, BSN, RN-BC    [id number]

## 2023-04-10 NOTE — PLAN OF CARE
Patient admitted under dr Hamzah Cerna for total knee. Dressing dry and intact. Pain controlled, vitals stable. Problem: PAIN - ADULT  Goal: Verbalizes/displays adequate comfort level or patient's stated pain goal  Description: INTERVENTIONS:  - Encourage pt to monitor pain and request assistance  - Assess pain using appropriate pain scale  - Administer analgesics based on type and severity of pain and evaluate response  - Implement non-pharmacological measures as appropriate and evaluate response  - Consider cultural and social influences on pain and pain management  - Manage/alleviate anxiety  - Utilize distraction and/or relaxation techniques  - Monitor for opioid side effects  - Notify MD/LIP if interventions unsuccessful or patient reports new pain  - Anticipate increased pain with activity and pre-medicate as appropriate  Outcome: Progressing     Problem: RISK FOR INFECTION - ADULT  Goal: Absence of fever/infection during anticipated neutropenic period  Description: INTERVENTIONS  - Monitor WBC  - Administer growth factors as ordered  - Implement neutropenic guidelines  Outcome: Progressing     Problem: SAFETY ADULT - FALL  Goal: Free from fall injury  Description: INTERVENTIONS:  - Assess pt frequently for physical needs  - Identify cognitive and physical deficits and behaviors that affect risk of falls.   - [place] fall precautions as indicated by assessment.  - Educate pt/family on patient safety including physical limitations  - Instruct pt to call for assistance with activity based on assessment  - Modify environment to reduce risk of injury  - Provide assistive devices as appropriate  - Consider OT/PT consult to assist with strengthening/mobility  - Encourage toileting schedule  Outcome: Progressing     Problem: DISCHARGE PLANNING  Goal: Discharge to home or other facility with appropriate resources  Description: INTERVENTIONS:  - Identify barriers to discharge w/pt and caregiver  - Include patient/family/discharge partner in discharge planning  - Arrange for needed discharge resources and transportation as appropriate  - Identify discharge learning needs (meds, wound care, etc)  - Arrange for interpreters to assist at discharge as needed  - Consider post-discharge preferences of patient/family/discharge partner  - Complete POLST form as appropriate  - Assess patient's ability to be responsible for managing their own health  - Refer to Case Management Department for coordinating discharge planning if the patient needs post-hospital services based on physician/LIP order or complex needs related to functional status, cognitive ability or social support system  Outcome: Progressing

## 2023-04-11 PROCEDURE — 97162 PT EVAL MOD COMPLEX 30 MIN: CPT

## 2023-04-11 PROCEDURE — 97166 OT EVAL MOD COMPLEX 45 MIN: CPT

## 2023-04-11 PROCEDURE — 97116 GAIT TRAINING THERAPY: CPT

## 2023-04-11 PROCEDURE — S0028 INJECTION, FAMOTIDINE, 20 MG: HCPCS | Performed by: PHYSICIAN ASSISTANT

## 2023-04-11 PROCEDURE — 97535 SELF CARE MNGMENT TRAINING: CPT

## 2023-04-11 RX ORDER — ASPIRIN 81 MG/1
81 TABLET ORAL 2 TIMES DAILY
Qty: 84 TABLET | Refills: 0 | Status: SHIPPED | COMMUNITY
Start: 2023-04-11 | End: 2023-04-12

## 2023-04-11 NOTE — PROGRESS NOTES
Left knee new Aquacel dressing noted to be more than 50% saturated. Dr. Kirk Albarado paged and waiting for call back.

## 2023-04-11 NOTE — PLAN OF CARE
Alert and oriented x4. VSS. On RA. . IS encouraged. SCDs on BLE. Tolerating diet. Pain controlled with scheduled medications. Aquacel dressing to left knee C/D/I. Gel ice and spandagrip applied. Ambulating with min assist. PT/OT. Preop set up with [facility]. Call light within reach.

## 2023-04-11 NOTE — PROGRESS NOTES
Anesthesia Pain Service    POD# 1 s/p TKA    Block type: Lower extremity: Adductor canal    Laterality: left    Injection technique: Single shot    Post op review: No evidence of immediate block related complications    Continue total joint protocol. Will sign off.     Tina Cruz MD  Anesthesiology

## 2023-04-11 NOTE — CM/SW NOTE
04/11/23 1000   CM/SW Referral Data   Referral Source Social Work (self-referral)   Reason for Referral Discharge planning   Informant EMR;Clinical Staff Member   Discharge Needs   Anticipated D/C needs Home health care       Patient is a 59 y/o woman admitted s/p TKR. Pt with pre-operative plan for [facility] [place]. PT recommending [place] [id number] services at discharge. Gurpreet Howell at [facility] confirmed pt accepted for [address] services at [place]. No other [place] needs/concerns identified at this time. / to remain available for support and/or discharge planning.      Batsheva Pinto LCSW  Discharge Planner  [phone]

## 2023-04-11 NOTE — PROGRESS NOTES
Apply pressure dressing to left knee with Adaptic, 4x4, ABD and ace wrap. Left knee looks more swollen this afternoon compared to this morning with bruising. Will hold Eliquis tonight as ordered.

## 2023-04-11 NOTE — PLAN OF CARE
POD 1 left total knee, pt is alert and oriented x4, VSS, room air, aquacel dressing dry and intact, IV fluids infusing as ordered, up min assist with walker, WBAT, PO meds for pain see MAR, plan to discharge today with [facility], all safety measures in place, call light within reach. Will continue to monitor.

## 2023-04-12 VITALS
HEART RATE: 71 BPM | DIASTOLIC BLOOD PRESSURE: 61 MMHG | SYSTOLIC BLOOD PRESSURE: 121 MMHG | RESPIRATION RATE: 18 BRPM | WEIGHT: 161 LBS | BODY MASS INDEX: 29.63 KG/M2 | OXYGEN SATURATION: 92 % | HEIGHT: 62 IN | TEMPERATURE: 98 F

## 2023-04-12 RX ORDER — ASPIRIN 81 MG/1
81 TABLET ORAL 2 TIMES DAILY
Qty: 84 TABLET | Refills: 0 | Status: ON HOLD | COMMUNITY
Start: 2023-04-14

## 2023-04-12 NOTE — PLAN OF CARE
Alert and oriented x4. VSS. On RA. . IS encouraged. SCDs on BLE. Tolerating diet. Pain controlled with scheduled medications. Acewrap dressing to left knee C/D/I. Gel ice and spandagrip applied. Ambulating with min assist. PT/OT. Plan dc with [facility]. Call light within reach.

## 2023-04-12 NOTE — PLAN OF CARE
NURSING DISCHARGE NOTE    Discharged Home via Ambulatory. Accompanied by Family member  Belongings Returned to patient from safe. Discharge video reviewed. Dressing supplies provided. Patient and family verbalize understanding of discharge instructions. Discharge medications received prior to surgery.

## 2023-04-14 NOTE — DISCHARGE SUMMARY
Discharge Summary  Patient ID:  Chidi Feliciano  [id number]  58year old  1/20/1960    Admit date: 4/10/2023    Discharge date and time: 4/12/23    Attending Physician: No att. providers found     Reason for admission: left knee primary osteoarthritis    Discharge Diagnoses: LEFT KNEE OSTEOARTHRITIS  Patient Active Problem List:     Elevated blood pressure reading without diagnosis of hypertension     Pruritus     Irritability     Menopausal disorder     Tobacco abuse     Benign essential hypertension     Elevated liver enzymes     Hyponatremia     Right lumbar radiculopathy     Rotator cuff syndrome, left     Complete rotator cuff tear of left shoulder     Full thickness tear of left subscapularis tendon     Spondylolisthesis of lumbar region     Spinal stenosis of lumbar region with neurogenic claudication     Lumbar radiculopathy     Unstable gait     Foot drop, bilateral     Neuropathy     Numbness and tingling of both feet     Risk for falls     Cervical myelopathy ([address]     Cervical myelopathy ([address]    Discharged Condition: stable    Hospital Course:   Internal medicine managed patient from medical stand point. Patient participated in PT and OT. Consults:  1) PCP (hospitalist)  2) Physical therapy  3) Occupational therapy  4) Case management  5) Social work      Procedures: left TKR    Disposition: home    Patient Instructions:   Discharge Medication List as of 4/12/2023 12:58 PM    CONTINUE these medications which have CHANGED    aspirin 81 MG Oral Tab EC  Take 1 tablet (81 mg total) by mouth in the morning and 1 tablet (81 mg total) before bedtime. , Historical, Disp-84 tablet, R-0      CONTINUE these medications which have NOT CHANGED    oxyCODONE 5 MG Oral Tab  Take 1 tablet (5 mg total) by mouth every 6 (six) hours as needed., Historical    traMADol 50 MG Oral Tab  Take 1 tablet (50 mg total) by mouth every 6 (six) hours as needed., Historical    cephalexin 500 MG Oral Cap  Take 1 capsule (500 mg total) by mouth 3 (three) times daily. , Historical    docusate sodium 100 MG Oral Cap  Take 100 mg by mouth 2 (two) times daily. , Historical    celecoxib 200 MG Oral Cap  Take 1 capsule (200 mg total) by mouth daily. , Historical    acetaminophen 500 MG Oral Tab  Take 2 tablets (1,000 mg total) by mouth every 6 (six) hours as needed for Pain., Historical    gabapentin 300 MG Oral Cap  Take 1 capsule (300 mg total) by mouth 3 (three) times daily. , Historical    TURMERIC OR  Take 3 tablets by mouth daily. , Historical    omega-3 fatty acids 1000 MG Oral Cap  Take 1,000 mg by mouth daily. , Historical    hydroxychloroquine 200 MG Oral Tab  Take 1 tablet (200 mg total) by mouth 2 (two) times daily. , Historical    atorvastatin 20 MG Oral Tab  Take 1 tablet (20 mg total) by mouth nightly., Historical    Calcium Citrate-Vitamin D (CALCIUM CITRATE + D3 OR)  Take 1 tablet by mouth daily. , Historical    vitamin E 400 UNITS Oral Cap  Take 1 capsule (400 Units total) by mouth daily. , Historical    BIOTIN OR  Take 1 tablet by mouth daily. , Historical    Ascorbic Acid (VITAMIN C OR)  Take 1 tablet by mouth daily. , Historical    loratadine 10 MG Oral Tab  Take 1 tablet (10 mg total) by mouth daily. , Historical    Lidocaine HCl 4 % External Cream  Apply 1 Application topically daily as needed., Historical      STOP taking these medications    Upadacitinib ER (RINVOQ) 15 MG Oral Tablet 24 Hr        Activity: activity as tolerated    Follow-up with Suzette Thomas MD in 2 weeks. Signed:   Suzette Thomas MD  4/14/2023  7:05 AM

## 2023-04-17 ENCOUNTER — APPOINTMENT (OUTPATIENT)
Dept: GENERAL RADIOLOGY | Facility: HOSPITAL | Age: 63
End: 2023-04-17
Attending: EMERGENCY MEDICINE
Payer: COMMERCIAL

## 2023-04-17 ENCOUNTER — HOSPITAL ENCOUNTER (INPATIENT)
Facility: HOSPITAL | Age: 63
LOS: 1 days | Discharge: NURSING, REHAB OR LONG-TERM CARE | End: 2023-04-18
Attending: EMERGENCY MEDICINE | Admitting: INTERNAL MEDICINE
Payer: COMMERCIAL

## 2023-04-17 DIAGNOSIS — M25.562 LEFT KNEE PAIN, UNSPECIFIED CHRONICITY: Primary | ICD-10-CM

## 2023-04-17 PROBLEM — E87.6 HYPOKALEMIA: Status: ACTIVE | Noted: 2023-04-17

## 2023-04-17 PROBLEM — R79.89 AZOTEMIA: Status: ACTIVE | Noted: 2023-04-17

## 2023-04-17 PROBLEM — D64.9 ANEMIA: Status: ACTIVE | Noted: 2023-04-17

## 2023-04-17 PROBLEM — R73.9 HYPERGLYCEMIA: Status: ACTIVE | Noted: 2023-04-17

## 2023-04-17 LAB
ALBUMIN SERPL-MCNC: 3.4 G/DL (ref 3.4–5)
ALBUMIN/GLOB SERPL: 0.9 {RATIO} (ref 1–2)
ALP LIVER SERPL-CCNC: 72 U/L
ALT SERPL-CCNC: 22 U/L
ANION GAP SERPL CALC-SCNC: 5 MMOL/L (ref 0–18)
AST SERPL-CCNC: 18 U/L (ref 15–37)
BASOPHILS # BLD AUTO: 0.02 X10(3) UL (ref 0–0.2)
BASOPHILS NFR BLD AUTO: 0.3 %
BILIRUB SERPL-MCNC: 0.6 MG/DL (ref 0.1–2)
BUN BLD-MCNC: 13 MG/DL (ref 7–18)
CALCIUM BLD-MCNC: 9.1 MG/DL (ref 8.5–10.1)
CHLORIDE SERPL-SCNC: 101 MMOL/L (ref 98–112)
CO2 SERPL-SCNC: 28 MMOL/L (ref 21–32)
CREAT BLD-MCNC: 0.65 MG/DL
CRP SERPL-MCNC: 10.6 MG/DL (ref ?–0.3)
EOSINOPHIL # BLD AUTO: 0.28 X10(3) UL (ref 0–0.7)
EOSINOPHIL NFR BLD AUTO: 4.5 %
ERYTHROCYTE [DISTWIDTH] IN BLOOD BY AUTOMATED COUNT: 13.6 %
ERYTHROCYTE [SEDIMENTATION RATE] IN BLOOD: 42 MM/HR
GFR SERPLBLD BASED ON 1.73 SQ M-ARVRAT: 98 ML/MIN/1.73M2 (ref 60–?)
GLOBULIN PLAS-MCNC: 4 G/DL (ref 2.8–4.4)
GLUCOSE BLD-MCNC: 110 MG/DL (ref 70–99)
HCT VFR BLD AUTO: 23.9 %
HGB BLD-MCNC: 7.7 G/DL
IMM GRANULOCYTES # BLD AUTO: 0.05 X10(3) UL (ref 0–1)
IMM GRANULOCYTES NFR BLD: 0.8 %
LYMPHOCYTES # BLD AUTO: 0.97 X10(3) UL (ref 1–4)
LYMPHOCYTES NFR BLD AUTO: 15.7 %
MCH RBC QN AUTO: 30.3 PG (ref 26–34)
MCHC RBC AUTO-ENTMCNC: 32.2 G/DL (ref 31–37)
MCV RBC AUTO: 94.1 FL
MONOCYTES # BLD AUTO: 0.75 X10(3) UL (ref 0.1–1)
MONOCYTES NFR BLD AUTO: 12.2 %
NEUTROPHILS # BLD AUTO: 4.09 X10 (3) UL (ref 1.5–7.7)
NEUTROPHILS # BLD AUTO: 4.09 X10(3) UL (ref 1.5–7.7)
NEUTROPHILS NFR BLD AUTO: 66.5 %
OSMOLALITY SERPL CALC.SUM OF ELEC: 279 MOSM/KG (ref 275–295)
PLATELET # BLD AUTO: 313 10(3)UL (ref 150–450)
POTASSIUM SERPL-SCNC: 3.5 MMOL/L (ref 3.5–5.1)
PROT SERPL-MCNC: 7.4 G/DL (ref 6.4–8.2)
RBC # BLD AUTO: 2.54 X10(6)UL
SARS-COV-2 RNA RESP QL NAA+PROBE: NOT DETECTED
SODIUM SERPL-SCNC: 134 MMOL/L (ref 136–145)
WBC # BLD AUTO: 6.2 X10(3) UL (ref 4–11)

## 2023-04-17 PROCEDURE — 97530 THERAPEUTIC ACTIVITIES: CPT

## 2023-04-17 PROCEDURE — 80053 COMPREHEN METABOLIC PANEL: CPT | Performed by: EMERGENCY MEDICINE

## 2023-04-17 PROCEDURE — 73560 X-RAY EXAM OF KNEE 1 OR 2: CPT | Performed by: EMERGENCY MEDICINE

## 2023-04-17 PROCEDURE — 97162 PT EVAL MOD COMPLEX 30 MIN: CPT

## 2023-04-17 PROCEDURE — 86140 C-REACTIVE PROTEIN: CPT | Performed by: EMERGENCY MEDICINE

## 2023-04-17 PROCEDURE — 85652 RBC SED RATE AUTOMATED: CPT | Performed by: EMERGENCY MEDICINE

## 2023-04-17 PROCEDURE — 85025 COMPLETE CBC W/AUTO DIFF WBC: CPT | Performed by: EMERGENCY MEDICINE

## 2023-04-17 RX ORDER — DOCUSATE SODIUM 100 MG/1
100 CAPSULE, LIQUID FILLED ORAL 2 TIMES DAILY
Status: DISCONTINUED | OUTPATIENT
Start: 2023-04-17 | End: 2023-04-18

## 2023-04-17 RX ORDER — CEPHALEXIN 500 MG/1
500 CAPSULE ORAL ONCE
Status: COMPLETED | OUTPATIENT
Start: 2023-04-17 | End: 2023-04-17

## 2023-04-17 RX ORDER — HYDROMORPHONE HYDROCHLORIDE 1 MG/ML
0.8 INJECTION, SOLUTION INTRAMUSCULAR; INTRAVENOUS; SUBCUTANEOUS EVERY 2 HOUR PRN
Status: DISCONTINUED | OUTPATIENT
Start: 2023-04-17 | End: 2023-04-18

## 2023-04-17 RX ORDER — CHLORAL HYDRATE 500 MG
1000 CAPSULE ORAL DAILY
Status: DISCONTINUED | OUTPATIENT
Start: 2023-04-17 | End: 2023-04-17

## 2023-04-17 RX ORDER — HYDROMORPHONE HYDROCHLORIDE 1 MG/ML
0.5 INJECTION, SOLUTION INTRAMUSCULAR; INTRAVENOUS; SUBCUTANEOUS EVERY 30 MIN PRN
Status: DISCONTINUED | OUTPATIENT
Start: 2023-04-17 | End: 2023-04-18

## 2023-04-17 RX ORDER — BISACODYL 10 MG
10 SUPPOSITORY, RECTAL RECTAL
Status: DISCONTINUED | OUTPATIENT
Start: 2023-04-17 | End: 2023-04-18

## 2023-04-17 RX ORDER — GABAPENTIN 300 MG/1
300 CAPSULE ORAL ONCE
Status: COMPLETED | OUTPATIENT
Start: 2023-04-17 | End: 2023-04-17

## 2023-04-17 RX ORDER — HYDROXYCHLOROQUINE SULFATE 200 MG/1
200 TABLET, FILM COATED ORAL 2 TIMES DAILY
Status: DISCONTINUED | OUTPATIENT
Start: 2023-04-17 | End: 2023-04-18

## 2023-04-17 RX ORDER — PROCHLORPERAZINE EDISYLATE 5 MG/ML
5 INJECTION INTRAMUSCULAR; INTRAVENOUS EVERY 8 HOURS PRN
Status: DISCONTINUED | OUTPATIENT
Start: 2023-04-17 | End: 2023-04-18

## 2023-04-17 RX ORDER — HYDROMORPHONE HYDROCHLORIDE 1 MG/ML
0.4 INJECTION, SOLUTION INTRAMUSCULAR; INTRAVENOUS; SUBCUTANEOUS EVERY 2 HOUR PRN
Status: DISCONTINUED | OUTPATIENT
Start: 2023-04-17 | End: 2023-04-18

## 2023-04-17 RX ORDER — SODIUM CHLORIDE 9 MG/ML
INJECTION, SOLUTION INTRAVENOUS CONTINUOUS
Status: ACTIVE | OUTPATIENT
Start: 2023-04-17 | End: 2023-04-17

## 2023-04-17 RX ORDER — HYDROMORPHONE HYDROCHLORIDE 1 MG/ML
0.5 INJECTION, SOLUTION INTRAMUSCULAR; INTRAVENOUS; SUBCUTANEOUS EVERY 30 MIN PRN
Status: DISCONTINUED | OUTPATIENT
Start: 2023-04-17 | End: 2023-04-17 | Stop reason: SDUPTHER

## 2023-04-17 RX ORDER — ONDANSETRON 2 MG/ML
4 INJECTION INTRAMUSCULAR; INTRAVENOUS EVERY 6 HOURS PRN
Status: DISCONTINUED | OUTPATIENT
Start: 2023-04-17 | End: 2023-04-18

## 2023-04-17 RX ORDER — ONDANSETRON 2 MG/ML
4 INJECTION INTRAMUSCULAR; INTRAVENOUS EVERY 4 HOURS PRN
Status: DISCONTINUED | OUTPATIENT
Start: 2023-04-17 | End: 2023-04-17 | Stop reason: SDUPTHER

## 2023-04-17 RX ORDER — GABAPENTIN 300 MG/1
300 CAPSULE ORAL 3 TIMES DAILY
Status: DISCONTINUED | OUTPATIENT
Start: 2023-04-17 | End: 2023-04-18

## 2023-04-17 RX ORDER — OXYCODONE HYDROCHLORIDE 5 MG/1
5 TABLET ORAL EVERY 6 HOURS PRN
Status: DISCONTINUED | OUTPATIENT
Start: 2023-04-17 | End: 2023-04-18

## 2023-04-17 RX ORDER — CETIRIZINE HYDROCHLORIDE 10 MG/1
10 TABLET ORAL DAILY
Status: DISCONTINUED | OUTPATIENT
Start: 2023-04-18 | End: 2023-04-18

## 2023-04-17 RX ORDER — ACETAMINOPHEN 500 MG
1000 TABLET ORAL EVERY 6 HOURS PRN
Status: DISCONTINUED | OUTPATIENT
Start: 2023-04-17 | End: 2023-04-18

## 2023-04-17 RX ORDER — SENNOSIDES 8.6 MG
17.2 TABLET ORAL NIGHTLY PRN
Status: DISCONTINUED | OUTPATIENT
Start: 2023-04-17 | End: 2023-04-18

## 2023-04-17 RX ORDER — ASPIRIN 81 MG/1
81 TABLET ORAL 2 TIMES DAILY
Status: DISCONTINUED | OUTPATIENT
Start: 2023-04-17 | End: 2023-04-18

## 2023-04-17 RX ORDER — SODIUM PHOSPHATE, DIBASIC AND SODIUM PHOSPHATE, MONOBASIC 7; 19 G/133ML; G/133ML
1 ENEMA RECTAL ONCE AS NEEDED
Status: DISCONTINUED | OUTPATIENT
Start: 2023-04-17 | End: 2023-04-18

## 2023-04-17 RX ORDER — HYDROMORPHONE HYDROCHLORIDE 1 MG/ML
0.2 INJECTION, SOLUTION INTRAMUSCULAR; INTRAVENOUS; SUBCUTANEOUS EVERY 2 HOUR PRN
Status: DISCONTINUED | OUTPATIENT
Start: 2023-04-17 | End: 2023-04-18

## 2023-04-17 RX ORDER — ATORVASTATIN CALCIUM 20 MG/1
20 TABLET, FILM COATED ORAL NIGHTLY
Status: DISCONTINUED | OUTPATIENT
Start: 2023-04-17 | End: 2023-04-18

## 2023-04-17 RX ORDER — POLYETHYLENE GLYCOL 3350 17 G/17G
17 POWDER, FOR SOLUTION ORAL DAILY PRN
Status: DISCONTINUED | OUTPATIENT
Start: 2023-04-17 | End: 2023-04-18

## 2023-04-17 NOTE — CM/SW NOTE
Request for insurance auth for [facility] was submitted earlier today by [facility]. Insurance [employer] Ch[employer] has not been received yet. Patient and MD updated. Patient is agreeable to staying in the ED until auth is received.

## 2023-04-17 NOTE — ED QUICK NOTES
Dinner tray order for patient. Patient made aware it takes 30-45 minutes to get the tray. Verbalized understanding.

## 2023-04-17 NOTE — ED QUICK NOTES
Assumed care of patient at this time. Received report from [facility]. Patient is alert and oriented, resting on the cot, vitals stable, no distress noted. Will continue to monitor.

## 2023-04-17 NOTE — ED INITIAL ASSESSMENT (HPI)
A&Ox3 patient bib ems from home for c/o L knee pain    Patient had L knee replacement by MD Fabrice Chavira on 4/10, started developing increased pain/swelling and yellow discharge at surgical site since Friday    Denies any fevers, no n/v/d    Patient did not tolerate Tramadol, was switched to [facility] and Oxycodone for pain management    RR even/NL, speaking in full clear sentences

## 2023-04-17 NOTE — CM/SW NOTE
[facility] has a bed for patient today pending insurance auth. [facility] is one of the facilities that patient would like to go to. Called [facility] to initiate [facility], was told they do not manage this patient. Brynn at [facility] will contact [facility] for [facility]. Per Mitch Santana, patient will have to patient 80% of VAL[facility] after she meets her deductible. Discussed with patient. Patient aware of out of pocket expenses and is agreeable to go to [facility]. PT recommending [facility] - attached in A[place].   Brynn at [facility] submitted for [facility] from [facility]

## 2023-04-17 NOTE — PROGRESS NOTES
Increase in pain over wknd. No fever. No distress  Left knee has moderate swelling.  +bruising. No obvious erythema. Inferior medial aspect has small blisters forming. Calf is NT. MS intact. S/p TKR  Placed her in a compressive dressing from toes to mid thigh. Do not take dressing down. WBAT  PT as tolerated. She was placed on keflex prophylactically due her h/o RA. Finish what is remaining. No need to continue. Fu in office 2-3 days. Instructed patient and  to make appt.

## 2023-04-17 NOTE — CM/SW NOTE
Asked to assist patient with SNF placement (if she is medically cleared by Ortho). Patient is s/p left TKR last week.   Patient was evaluated by PT on 4/11/23 - recommended home with home PT  Patient with increasing pain and increasing difficulty ambulating  Patient normally uses walker d/t RA  Patient is agreeable to go to SNF  PASRR completed  PT eval ordered  Insurance auth initiated

## 2023-04-18 ENCOUNTER — INITIAL APN SNF VISIT (OUTPATIENT)
Dept: INTERNAL MEDICINE CLINIC | Age: 63
End: 2023-04-18

## 2023-04-18 VITALS
DIASTOLIC BLOOD PRESSURE: 62 MMHG | SYSTOLIC BLOOD PRESSURE: 119 MMHG | RESPIRATION RATE: 18 BRPM | HEART RATE: 79 BPM | WEIGHT: 160 LBS | TEMPERATURE: 98 F | OXYGEN SATURATION: 92 % | BODY MASS INDEX: 30.21 KG/M2 | HEIGHT: 61 IN

## 2023-04-18 VITALS
RESPIRATION RATE: 18 BRPM | SYSTOLIC BLOOD PRESSURE: 119 MMHG | OXYGEN SATURATION: 93 % | DIASTOLIC BLOOD PRESSURE: 71 MMHG | TEMPERATURE: 99 F | HEART RATE: 93 BPM

## 2023-04-18 DIAGNOSIS — Z96.652 AFTERCARE FOLLOWING LEFT KNEE JOINT REPLACEMENT SURGERY: ICD-10-CM

## 2023-04-18 DIAGNOSIS — Z98.890 S/P LEFT KNEE SURGERY: ICD-10-CM

## 2023-04-18 DIAGNOSIS — R60.0 EDEMA OF LEFT LOWER LEG: ICD-10-CM

## 2023-04-18 DIAGNOSIS — E78.5 HYPERLIPIDEMIA, UNSPECIFIED HYPERLIPIDEMIA TYPE: ICD-10-CM

## 2023-04-18 DIAGNOSIS — M25.562 LEFT KNEE PAIN, UNSPECIFIED CHRONICITY: Primary | ICD-10-CM

## 2023-04-18 DIAGNOSIS — D50.0 IRON DEFICIENCY ANEMIA DUE TO CHRONIC BLOOD LOSS: ICD-10-CM

## 2023-04-18 DIAGNOSIS — M05.79 RHEUMATOID ARTHRITIS INVOLVING MULTIPLE SITES WITH POSITIVE RHEUMATOID FACTOR (HCC): ICD-10-CM

## 2023-04-18 DIAGNOSIS — Z47.1 AFTERCARE FOLLOWING LEFT KNEE JOINT REPLACEMENT SURGERY: ICD-10-CM

## 2023-04-18 LAB
ALBUMIN SERPL-MCNC: 3.3 G/DL (ref 3.4–5)
ALBUMIN/GLOB SERPL: 0.9 {RATIO} (ref 1–2)
ALP LIVER SERPL-CCNC: 63 U/L
ALT SERPL-CCNC: 20 U/L
ANION GAP SERPL CALC-SCNC: 4 MMOL/L (ref 0–18)
AST SERPL-CCNC: 17 U/L (ref 15–37)
BASOPHILS # BLD AUTO: 0.02 X10(3) UL (ref 0–0.2)
BASOPHILS NFR BLD AUTO: 0.3 %
BILIRUB SERPL-MCNC: 0.6 MG/DL (ref 0.1–2)
BUN BLD-MCNC: 11 MG/DL (ref 7–18)
CALCIUM BLD-MCNC: 9.2 MG/DL (ref 8.5–10.1)
CHLORIDE SERPL-SCNC: 104 MMOL/L (ref 98–112)
CO2 SERPL-SCNC: 27 MMOL/L (ref 21–32)
CREAT BLD-MCNC: 0.54 MG/DL
EOSINOPHIL # BLD AUTO: 0.26 X10(3) UL (ref 0–0.7)
EOSINOPHIL NFR BLD AUTO: 4.1 %
ERYTHROCYTE [DISTWIDTH] IN BLOOD BY AUTOMATED COUNT: 13.9 %
GFR SERPLBLD BASED ON 1.73 SQ M-ARVRAT: 103 ML/MIN/1.73M2 (ref 60–?)
GLOBULIN PLAS-MCNC: 3.6 G/DL (ref 2.8–4.4)
GLUCOSE BLD-MCNC: 106 MG/DL (ref 70–99)
HCT VFR BLD AUTO: 23 %
HGB BLD-MCNC: 7.2 G/DL
IMM GRANULOCYTES # BLD AUTO: 0.03 X10(3) UL (ref 0–1)
IMM GRANULOCYTES NFR BLD: 0.5 %
LYMPHOCYTES # BLD AUTO: 1.01 X10(3) UL (ref 1–4)
LYMPHOCYTES NFR BLD AUTO: 15.8 %
MAGNESIUM SERPL-MCNC: 2.3 MG/DL (ref 1.6–2.6)
MCH RBC QN AUTO: 30.6 PG (ref 26–34)
MCHC RBC AUTO-ENTMCNC: 31.3 G/DL (ref 31–37)
MCV RBC AUTO: 97.9 FL
MONOCYTES # BLD AUTO: 0.92 X10(3) UL (ref 0.1–1)
MONOCYTES NFR BLD AUTO: 14.4 %
NEUTROPHILS # BLD AUTO: 4.17 X10 (3) UL (ref 1.5–7.7)
NEUTROPHILS # BLD AUTO: 4.17 X10(3) UL (ref 1.5–7.7)
NEUTROPHILS NFR BLD AUTO: 64.9 %
OSMOLALITY SERPL CALC.SUM OF ELEC: 280 MOSM/KG (ref 275–295)
PLATELET # BLD AUTO: 333 10(3)UL (ref 150–450)
POTASSIUM SERPL-SCNC: 4 MMOL/L (ref 3.5–5.1)
PROT SERPL-MCNC: 6.9 G/DL (ref 6.4–8.2)
RBC # BLD AUTO: 2.35 X10(6)UL
SODIUM SERPL-SCNC: 135 MMOL/L (ref 136–145)
WBC # BLD AUTO: 6.4 X10(3) UL (ref 4–11)

## 2023-04-18 PROCEDURE — 99310 SBSQ NF CARE HIGH MDM 45: CPT | Performed by: NURSE PRACTITIONER

## 2023-04-18 PROCEDURE — 83735 ASSAY OF MAGNESIUM: CPT | Performed by: HOSPITALIST

## 2023-04-18 PROCEDURE — 85025 COMPLETE CBC W/AUTO DIFF WBC: CPT | Performed by: HOSPITALIST

## 2023-04-18 PROCEDURE — 3078F DIAST BP <80 MM HG: CPT | Performed by: NURSE PRACTITIONER

## 2023-04-18 PROCEDURE — 97535 SELF CARE MNGMENT TRAINING: CPT

## 2023-04-18 PROCEDURE — 97165 OT EVAL LOW COMPLEX 30 MIN: CPT

## 2023-04-18 PROCEDURE — 3074F SYST BP LT 130 MM HG: CPT | Performed by: NURSE PRACTITIONER

## 2023-04-18 PROCEDURE — 80053 COMPREHEN METABOLIC PANEL: CPT | Performed by: HOSPITALIST

## 2023-04-18 RX ORDER — ACETAMINOPHEN 500 MG
1000 TABLET ORAL EVERY 6 HOURS PRN
Qty: 120 TABLET | Refills: 0 | Status: SHIPPED | OUTPATIENT
Start: 2023-04-18

## 2023-04-18 RX ORDER — ASPIRIN 81 MG/1
81 TABLET ORAL 2 TIMES DAILY
Qty: 84 TABLET | Refills: 0 | Status: SHIPPED | OUTPATIENT
Start: 2023-04-18

## 2023-04-18 RX ORDER — NALOXONE HYDROCHLORIDE 4 MG/.1ML
4 SPRAY NASAL AS NEEDED
Qty: 1 KIT | Refills: 0 | Status: SHIPPED | OUTPATIENT
Start: 2023-04-18

## 2023-04-18 RX ORDER — OXYCODONE HYDROCHLORIDE 5 MG/1
5 TABLET ORAL EVERY 6 HOURS PRN
Qty: 20 TABLET | Refills: 0 | Status: SHIPPED | OUTPATIENT
Start: 2023-04-18

## 2023-04-18 RX ORDER — CELECOXIB 200 MG/1
200 CAPSULE ORAL DAILY
Qty: 30 CAPSULE | Refills: 0 | Status: SHIPPED | OUTPATIENT
Start: 2023-04-18

## 2023-04-18 NOTE — ED QUICK NOTES
Patient informed of decision to admit her upstairs d/t unable to get [facility] authorized tonight and her having hypoxia after pain meds and when she falls asleep. Patient agrees with treatment plan verbalized understanding. Requesting lip balm, which was given to patient. Waiting for bed placement.

## 2023-04-18 NOTE — ED QUICK NOTES
Orders for admission, patient is aware of plan and ready to go upstairs. Any questions, please call ED RN Nia Salgado at extension [phone].      Patient Covid vaccination status: Fully vaccinated     COVID Test Ordered in ED: Rapid SARS-CoV-2 by PCR    COVID Suspicion at Admission: N/A    Running Infusions:  None    Mental Status/LOC at time of transport: AAOx4    Other pertinent information:   CIWA score: N/A   NIH score:  N/A

## 2023-04-18 NOTE — PROGRESS NOTES
AVS completed, Tramadol removed fr AVS ( allergic reaction) , IV dc'd , will dc to [facility] via ambulance-will be here in 20 min.

## 2023-04-18 NOTE — PLAN OF CARE
Patient is alert and oriented x4. Vital signs within normal range. Continuous pulse oximeter in place. Tele monitoring. Cardiac electrolyte protocol in place. Cardiac diet. Compression dressing to stay in place until follow-up with Dr. Johnathon Tejada on 4/20. PT/OT evaluations completed. Weight-bearing as tolerated with walker. Provided discharge report to Sae Mathew, RN at [facility]. Patient is schedule for transport between 1300 and 1330.

## 2023-04-18 NOTE — CM/SW NOTE
PT/OT recommending [facility]. Pt accepted at [facility] pending insurance [address]. OT eval added to [facility] referral.  Received updated from [facility] that [address] is still pending at this time. Await auth for discharge. / to remain available for support and/or discharge planning. [facility]  [phone]   [address], [facility]  Discharge Planner  [phone]    Addendum:  Informed by [facility] that they have received insurance auth and can accept pt for admission today. [facility] transport scheduled for 1pm.  PCS form completed and available for RN to print. Updated pt's RN and pt at bedside. Informed pt of costs for medicar transport.

## 2023-04-18 NOTE — PROGRESS NOTES
NURSING ADMISSION NOTE      Patient admitted via stretecher  Oriented to room. Safety precautions initiated. Bed in low position. Call light in reach.

## 2023-04-18 NOTE — ED NOTES
The patient's case was discussed with the  we have been attempting to get this patient transferred over to a rehab facility but patient had already been seen by Dr. Angelica Villareal here in the emergency room and felt that the patient is more from pain control need needed to go to facility she has required multiple doses of Dilaudid here. She does get better with the Dilaudid. But because unable to get the patient admission and the fact that with the Dilaudid she does get hypoxic she was placed on nasal cannula the patient's case was Case discussed with Dr. Atkins Post the patient will be admitted for pain control and further evaluation.

## 2023-04-21 ENCOUNTER — SNF VISIT (OUTPATIENT)
Dept: INTERNAL MEDICINE CLINIC | Age: 63
End: 2023-04-21

## 2023-04-21 DIAGNOSIS — Z98.890 S/P LEFT KNEE SURGERY: ICD-10-CM

## 2023-04-21 DIAGNOSIS — R60.0 EDEMA OF LEFT LOWER LEG: ICD-10-CM

## 2023-04-21 DIAGNOSIS — D50.8 OTHER IRON DEFICIENCY ANEMIA: ICD-10-CM

## 2023-04-21 DIAGNOSIS — M25.562 LEFT KNEE PAIN, UNSPECIFIED CHRONICITY: Primary | ICD-10-CM

## 2023-04-21 DIAGNOSIS — E55.9 VITAMIN D DEFICIENCY: ICD-10-CM

## 2023-04-21 DIAGNOSIS — D50.0 IRON DEFICIENCY ANEMIA DUE TO CHRONIC BLOOD LOSS: ICD-10-CM

## 2023-04-21 PROCEDURE — 3078F DIAST BP <80 MM HG: CPT | Performed by: NURSE PRACTITIONER

## 2023-04-21 PROCEDURE — 3074F SYST BP LT 130 MM HG: CPT | Performed by: NURSE PRACTITIONER

## 2023-04-21 PROCEDURE — 99309 SBSQ NF CARE MODERATE MDM 30: CPT | Performed by: NURSE PRACTITIONER

## 2023-04-22 VITALS
TEMPERATURE: 97 F | SYSTOLIC BLOOD PRESSURE: 124 MMHG | OXYGEN SATURATION: 99 % | RESPIRATION RATE: 20 BRPM | HEART RATE: 79 BPM | DIASTOLIC BLOOD PRESSURE: 69 MMHG

## 2023-04-25 ENCOUNTER — SNF DISCHARGE (OUTPATIENT)
Dept: INTERNAL MEDICINE CLINIC | Age: 63
End: 2023-04-25

## 2023-04-25 DIAGNOSIS — Z98.890 S/P LEFT KNEE SURGERY: ICD-10-CM

## 2023-04-25 DIAGNOSIS — Z96.652 AFTERCARE FOLLOWING LEFT KNEE JOINT REPLACEMENT SURGERY: ICD-10-CM

## 2023-04-25 DIAGNOSIS — D50.8 OTHER IRON DEFICIENCY ANEMIA: ICD-10-CM

## 2023-04-25 DIAGNOSIS — R60.0 EDEMA OF LEFT LOWER LEG: ICD-10-CM

## 2023-04-25 DIAGNOSIS — M25.562 LEFT KNEE PAIN, UNSPECIFIED CHRONICITY: Primary | ICD-10-CM

## 2023-04-25 DIAGNOSIS — Z47.1 AFTERCARE FOLLOWING LEFT KNEE JOINT REPLACEMENT SURGERY: ICD-10-CM

## 2023-04-25 DIAGNOSIS — E55.9 VITAMIN D DEFICIENCY: ICD-10-CM

## 2023-04-25 PROCEDURE — 3078F DIAST BP <80 MM HG: CPT | Performed by: NURSE PRACTITIONER

## 2023-04-25 PROCEDURE — 99316 NF DSCHRG MGMT 30 MIN+: CPT | Performed by: NURSE PRACTITIONER

## 2023-04-25 PROCEDURE — 3074F SYST BP LT 130 MM HG: CPT | Performed by: NURSE PRACTITIONER

## 2023-04-26 VITALS
RESPIRATION RATE: 20 BRPM | SYSTOLIC BLOOD PRESSURE: 127 MMHG | DIASTOLIC BLOOD PRESSURE: 71 MMHG | TEMPERATURE: 97 F | HEART RATE: 79 BPM | OXYGEN SATURATION: 99 %

## 2023-05-22 ENCOUNTER — HOSPITAL ENCOUNTER (OUTPATIENT)
Dept: ULTRASOUND IMAGING | Facility: HOSPITAL | Age: 63
Discharge: HOME | End: 2023-05-22
Attending: INTERNAL MEDICINE
Payer: COMMERCIAL

## 2023-05-22 ENCOUNTER — OFFICE VISIT (OUTPATIENT)
Dept: WOUND CARE | Facility: HOSPITAL | Age: 63
End: 2023-05-22
Attending: INTERNAL MEDICINE
Payer: COMMERCIAL

## 2023-05-22 VITALS
HEIGHT: 61 IN | SYSTOLIC BLOOD PRESSURE: 146 MMHG | WEIGHT: 158 LBS | HEART RATE: 91 BPM | TEMPERATURE: 99 F | RESPIRATION RATE: 16 BRPM | DIASTOLIC BLOOD PRESSURE: 74 MMHG | BODY MASS INDEX: 29.83 KG/M2

## 2023-05-22 DIAGNOSIS — M79.89 LEFT LEG SWELLING: ICD-10-CM

## 2023-05-22 DIAGNOSIS — S81.002A OPEN WOUND OF LEFT KNEE, INITIAL ENCOUNTER: ICD-10-CM

## 2023-05-22 DIAGNOSIS — R23.8 SLOUGHING OF WOUND: ICD-10-CM

## 2023-05-22 DIAGNOSIS — T81.89XA NON-HEALING SURGICAL WOUND, INITIAL ENCOUNTER: ICD-10-CM

## 2023-05-22 DIAGNOSIS — I96 NECROSIS (HCC): ICD-10-CM

## 2023-05-22 DIAGNOSIS — S81.002A OPEN WOUND OF LEFT KNEE, INITIAL ENCOUNTER: Primary | ICD-10-CM

## 2023-05-22 PROCEDURE — 99214 OFFICE O/P EST MOD 30 MIN: CPT

## 2023-05-22 PROCEDURE — 87070 CULTURE OTHR SPECIMN AEROBIC: CPT | Performed by: INTERNAL MEDICINE

## 2023-05-22 PROCEDURE — 93971 EXTREMITY STUDY: CPT | Performed by: INTERNAL MEDICINE

## 2023-05-22 PROCEDURE — 11043 DBRDMT MUSC&/FSCA 1ST 20/<: CPT | Performed by: INTERNAL MEDICINE

## 2023-05-22 RX ORDER — SULFAMETHOXAZOLE AND TRIMETHOPRIM 800; 160 MG/1; MG/1
1 TABLET ORAL 2 TIMES DAILY
Qty: 14 TABLET | Refills: 0 | Status: SHIPPED | OUTPATIENT
Start: 2023-05-22 | End: 2023-05-29

## 2023-05-22 RX ORDER — GENTAMICIN SULFATE 1 MG/G
1 OINTMENT TOPICAL 2 TIMES DAILY
Qty: 30 G | Refills: 3 | Status: SHIPPED | OUTPATIENT
Start: 2023-05-22

## 2023-05-22 NOTE — PATIENT INSTRUCTIONS
USS LLE venous doppler to r/o DVT  Wound culture  Start topical gentmaycin and oral bactrmi. Knne brace to immobilize Left knee  Compression garment LLE. Wound Cleaning and Dressings:    Wash your hands with soap and water. Always wear gloves while changing dressings. Donot touch wound / stacy-wound skin with un-gloved hands. Remove old dressing, discard and place into trash. DRESSINGS: gentamycin / pack with gauze   Change dressing twice daily. Compression Therapy : Yes, spandagrip  Compression Therapy Instructions:  1. Put on first thing in the morning and may remove at bedside. Okay to wear overnight      if comfortable. Do not let stockings roll up/down and kink. Hand wash stockings and      hang dry as needed. 2.  Avoid prolonged standing in one place. It is better to have your calf muscles moving       to pump fluid out of the legs. 3.  Elevate leg(s) above the level of the heart when sitting or as much as possible. 4.  Take your diuretics as directed by your provider. Do not skip doses or change doses      unless instructed to do so by your provider. 5. Do not get leg(s) with compression wrap wet. If wraps are too tight as indicated        By pain, numbness/tingling or discoloration of toes remove wrap completely       and call the   wound center. Miscellaneous Instructions:  Supplement with a daily multivitamin   Low salt diet  Increase protein intake / consider protein supplements - see below  Elevate extremities at all times when sitting / laying down.     DIETARY MODIFICATIONS TO HELP WITH WOUND HEALING:    Protein: Meats, beans, eggs, milk and yogurt particularly [place] yogurt), tofu, soy nuts, soy protein products    Vitamin C: Citrus fruits and juices, strawberries, tomatoes, tomato juice, peppers, baked potatoes, spinach, broccoli, cauliflower, [place] sprouts, cabbage    Vitamin A: Dark green, leafy vegetables, orange or yellow vegetables, cantaloupe, fortified dairy products, liver, fortified cereals    Zinc: Fortified cereals, red meats, seafood    Consider Doni by [employer] (These are essential branch chain amino acids that help with tissue building and wound healing) and take 2 packets/day. you can order online at [employer] or [facility] [employer] REMINDERS:    The treatment plan has been discussed at length with you and your provider. Follow all instructions carefully, it is very important. If you do not follow all instructions, you are at  risk of your wound not healing, infection, possible loss of limb and even end of life. Please call the clinic during regular business hours ( 7:30 AM - 5:30 PM) if you notice increased bleeding, redness, warmth, pain or pus like drainage or start running a fever greater than 100.3. For after hour emergencies, please call your primary physician or go to the nearest emergency room.

## 2023-05-22 NOTE — PROGRESS NOTES
Patient ID: Rafi Bone is a 58year old female. Debridement   Wound 05/22/23 #1 Left knee Old surgical Knee Anterior; Left    Consent obtained? verbal  Consent given by: patient    Performed by: provider  Debridement type: surgical  Level of debridement: muscle  Pain control: lidocaine 4%  Pre-debridement measurements  Length (cm): 4  Width (cm): 1.8  Surface Area (cm^2): 7.2      Post-debridement measurements  Length (cm): 4.2  Width (cm): 1.9  Depth (cm): 0.6  Percent debrided: 100%  Surface Area (cm^2): 7.98  Area debrided (cm^2): 7.98  Volume (cm^3): 4.79  Tissue and other material debrided: muscle and subcutaneous tissue  Devitalized tissue debrided: biofilm, necrotic debris and slough  Instrument(s) utilized: blade, curette and forceps  Bleeding: small  Hemostasis obtained with: silver nitrate  Procedural pain (0-10): 3  Post-procedural pain: 2   Response to treatment: procedure was tolerated well

## 2023-05-22 NOTE — PROGRESS NOTES
.Weekly Wound Education Note    Teaching Provided To: Patient; Family  Training Topics: Discharge instructions;Dressing;Edema control; Compression;Cleasing and general instructions  Training Method: Explain/Verbal;Written  Training Response: Patient responds and understands; Reinforcement needed           Gentamicin ointment ordered this visit, patient to apply twice daily. Extra supplies provided. Patient encouraged to wear her knee immobilizer brace. STAT Ultrasound for left leg ordered. Spanda  size E to mid thigh. Oral antibiotics ordered, wound cultured.

## 2023-05-23 NOTE — PROGRESS NOTES
Attempted to call, LVM on identifiable VM - not DVT noted at this time, continue orders from dressing changes. Awaiting culture results. Advised to call clinic if there are any questions or concerns.

## 2023-05-26 ENCOUNTER — OFFICE VISIT (OUTPATIENT)
Dept: WOUND CARE | Facility: HOSPITAL | Age: 63
End: 2023-05-26
Attending: INTERNAL MEDICINE
Payer: COMMERCIAL

## 2023-05-26 VITALS
HEART RATE: 76 BPM | TEMPERATURE: 97 F | DIASTOLIC BLOOD PRESSURE: 71 MMHG | RESPIRATION RATE: 16 BRPM | SYSTOLIC BLOOD PRESSURE: 128 MMHG

## 2023-05-26 DIAGNOSIS — S81.002A OPEN WOUND OF LEFT KNEE, INITIAL ENCOUNTER: Primary | ICD-10-CM

## 2023-05-26 PROCEDURE — 99214 OFFICE O/P EST MOD 30 MIN: CPT

## 2023-05-31 ENCOUNTER — OFFICE VISIT (OUTPATIENT)
Dept: WOUND CARE | Facility: HOSPITAL | Age: 63
End: 2023-05-31
Attending: INTERNAL MEDICINE
Payer: COMMERCIAL

## 2023-05-31 VITALS
RESPIRATION RATE: 16 BRPM | TEMPERATURE: 98 F | HEART RATE: 75 BPM | SYSTOLIC BLOOD PRESSURE: 130 MMHG | DIASTOLIC BLOOD PRESSURE: 65 MMHG

## 2023-05-31 DIAGNOSIS — I96 NECROSIS (HCC): ICD-10-CM

## 2023-05-31 DIAGNOSIS — M79.89 LEFT LEG SWELLING: ICD-10-CM

## 2023-05-31 DIAGNOSIS — T81.89XD NON-HEALING SURGICAL WOUND, SUBSEQUENT ENCOUNTER: ICD-10-CM

## 2023-05-31 DIAGNOSIS — R23.8 SLOUGHING OF WOUND: ICD-10-CM

## 2023-05-31 DIAGNOSIS — S81.002D OPEN WOUND OF LEFT KNEE, SUBSEQUENT ENCOUNTER: Primary | ICD-10-CM

## 2023-05-31 PROBLEM — T81.89XA SURGICAL WOUND, NON HEALING: Status: ACTIVE | Noted: 2023-05-31

## 2023-05-31 PROBLEM — S81.002A OPEN WOUND OF LEFT KNEE: Status: ACTIVE | Noted: 2023-05-31

## 2023-05-31 PROCEDURE — 11042 DBRDMT SUBQ TIS 1ST 20SQCM/<: CPT | Performed by: INTERNAL MEDICINE

## 2023-05-31 RX ORDER — UPADACITINIB 15 MG/1
TABLET, EXTENDED RELEASE ORAL
COMMUNITY
Start: 2023-05-11

## 2023-05-31 RX ORDER — CYCLOBENZAPRINE HCL 5 MG
TABLET ORAL
COMMUNITY
Start: 2023-04-14

## 2023-05-31 RX ORDER — COLLAGENASE SANTYL 250 [ARB'U]/G
OINTMENT TOPICAL
Qty: 90 G | Refills: 0 | Status: SHIPPED | OUTPATIENT
Start: 2023-05-31

## 2023-05-31 NOTE — PROGRESS NOTES
Patient ID: Harshil Beard is a 58year old female. Debridement   Consent obtained? verbal  Consent given by: patient  Risks discussed? procedural risks discussed  Performed by: provider  Debridement type: surgical  Level of debridement: subcutaneous tissue  Pain control: lidocaine 4%  Pre-debridement measurements  Length (cm): 4.2  Width (cm): 2  Depth (cm): 0.9  Surface Area (cm^2): 8.4  Volume (cm^3): 7.56    Post-debridement measurements  Length (cm): 4.2  Width (cm): 2.1  Depth (cm): 1.1  Percent debrided: 75%  Surface Area (cm^2): 8.82  Area debrided (cm^2): 6.62  Volume (cm^3): 9.7  Devitalized tissue debrided: biofilm, clots, necrotic debris and slough  Instrument(s) utilized: curette, blade and scissors  Bleeding: medium  Hemostasis obtained with: pressure  Procedural pain (0-10): 1  Post-procedural pain: 0   Response to treatment: procedure was tolerated well        Post-Debridement underminin-12 o'clock deepest at 11 o'clock at 1.3cm.

## 2023-05-31 NOTE — PROGRESS NOTES
Weekly Wound Education Note    Teaching Provided To: Patient;Caregiver  Training Topics: Cleasing and general instructions; Compression; Discharge instructions;Dressing;Edema control  Training Method: Explain/Verbal  Training Response: Patient responds and understands; Reinforcement needed        Notes: Stable. Honey gel, silver alginate, bordered gauze. Pt to continue to use spandagrip E and knee brace. Santyl ordered today, educated on dressing changes - supplies provided. Wound vac and HH ordered today.

## 2023-05-31 NOTE — PATIENT INSTRUCTIONS
Start santyl / gauze ( honey / silver alginate today)  Knee brace to immobilize Left knee  Compression garment LLE. Order wound vac. Orders Placed This Encounter      Debridement          Order Comments: This order was created via procedure documentation      collagenase (SANTYL) 250 UNIT/GM External Ointment          Sig: Apply topical daily. Dispense:  90 g          Refill:  0      RINVOQ 15 MG Oral Tablet 24 Hr      cyclobenzaprine 5 MG Oral Tab      Wound Cleaning and Dressings:    Wash your hands with soap and water. Always wear gloves while changing dressings. Donot touch wound / stacy-wound skin with un-gloved hands. Remove old dressing, discard and place into trash. DRESSINGS: santyl  / pack with moist gauze   Change dressing  daily. Compression Therapy : Yes, spandagrip  Compression Therapy Instructions:  1. Put on first thing in the morning and may remove at bedside. Okay to wear overnight      if comfortable. Do not let stockings roll up/down and kink. Hand wash stockings and      hang dry as needed. 2.  Avoid prolonged standing in one place. It is better to have your calf muscles moving       to pump fluid out of the legs. 3.  Elevate leg(s) above the level of the heart when sitting or as much as possible. 4.  Take your diuretics as directed by your provider. Do not skip doses or change doses      unless instructed to do so by your provider. 5. Do not get leg(s) with compression wrap wet. If wraps are too tight as indicated        By pain, numbness/tingling or discoloration of toes remove wrap completely       and call the   wound center. Miscellaneous Instructions:  Supplement with a daily multivitamin   Low salt diet  Increase protein intake / consider protein supplements - see below  Elevate extremities at all times when sitting / laying down.     DIETARY MODIFICATIONS TO HELP WITH WOUND HEALING:    Protein: Meats, beans, eggs, milk and yogurt particularly [place] yogurt), tofu, soy nuts, soy protein products    Vitamin C: Citrus fruits and juices, strawberries, tomatoes, tomato juice, peppers, baked potatoes, spinach, broccoli, cauliflower, [place] sprouts, cabbage    Vitamin A: Dark green, leafy vegetables, orange or yellow vegetables, cantaloupe, fortified dairy products, liver, fortified cereals    Zinc: Fortified cereals, red meats, seafood    Consider Doni by [employer] (These are essential branch chain amino acids that help with tissue building and wound healing) and take 2 packets/day. you can order online at [employer] or [address] [place] [address] Drive REMINDERS:    The treatment plan has been discussed at length with you and your provider. Follow all instructions carefully, it is very important. If you do not follow all instructions, you are at  risk of your wound not healing, infection, possible loss of limb and even end of life. Please call the clinic during regular business hours ( 7:30 AM - 5:30 PM) if you notice increased bleeding, redness, warmth, pain or pus like drainage or start running a fever greater than 100.3. For after hour emergencies, please call your primary physician or go to the nearest emergency room.

## 2023-06-02 ENCOUNTER — APPOINTMENT (OUTPATIENT)
Dept: WOUND CARE | Facility: HOSPITAL | Age: 63
End: 2023-06-02
Attending: INTERNAL MEDICINE
Payer: COMMERCIAL

## 2023-06-07 ENCOUNTER — OFFICE VISIT (OUTPATIENT)
Dept: WOUND CARE | Facility: HOSPITAL | Age: 63
End: 2023-06-07
Attending: INTERNAL MEDICINE
Payer: COMMERCIAL

## 2023-06-07 VITALS
RESPIRATION RATE: 16 BRPM | TEMPERATURE: 99 F | DIASTOLIC BLOOD PRESSURE: 65 MMHG | SYSTOLIC BLOOD PRESSURE: 112 MMHG | HEART RATE: 83 BPM

## 2023-06-07 DIAGNOSIS — R23.8 SLOUGHING OF WOUND: ICD-10-CM

## 2023-06-07 DIAGNOSIS — I96 NECROSIS (HCC): ICD-10-CM

## 2023-06-07 DIAGNOSIS — M79.89 LEFT LEG SWELLING: ICD-10-CM

## 2023-06-07 DIAGNOSIS — T81.89XD NON-HEALING SURGICAL WOUND, SUBSEQUENT ENCOUNTER: ICD-10-CM

## 2023-06-07 DIAGNOSIS — S81.002D OPEN WOUND OF LEFT KNEE, SUBSEQUENT ENCOUNTER: Primary | ICD-10-CM

## 2023-06-07 PROCEDURE — 99214 OFFICE O/P EST MOD 30 MIN: CPT

## 2023-06-07 PROCEDURE — 11042 DBRDMT SUBQ TIS 1ST 20SQCM/<: CPT | Performed by: INTERNAL MEDICINE

## 2023-06-07 PROCEDURE — 97605 NEG PRS WND THER DME<=50SQCM: CPT

## 2023-06-07 RX ORDER — FUROSEMIDE 40 MG/1
40 TABLET ORAL DAILY
Qty: 14 TABLET | Refills: 0 | Status: ON HOLD | OUTPATIENT
Start: 2023-06-07

## 2023-06-07 RX ORDER — POTASSIUM CHLORIDE 750 MG/1
10 TABLET, FILM COATED, EXTENDED RELEASE ORAL DAILY
Qty: 14 TABLET | Refills: 0 | Status: ON HOLD | OUTPATIENT
Start: 2023-06-07 | End: 2023-06-21

## 2023-06-07 NOTE — PROGRESS NOTES
Patient ID: Demario Moreland is a 58year old female. Debridement   Wound 05/22/23 #1 Left knee Old surgical Knee Anterior; Left    Consent obtained? verbal  Consent given by: patient  Risks discussed?  procedural risks discussed  Performed by: provider  Debridement type: surgical  Level of debridement: subcutaneous tissue  Pain control: lidocaine 4%  Pre-debridement measurements  Length (cm): 3.4  Width (cm): 2.5  Depth (cm): 0.5  Surface Area (cm^2): 8.5  Volume (cm^3): 4.25    Post-debridement measurements  Length (cm): 3.4  Width (cm): 2.6  Depth (cm): 0.6  Percent debrided: 50%  Surface Area (cm^2): 8.84  Area debrided (cm^2): 4.42  Volume (cm^3): 5.3  Devitalized tissue debrided: biofilm, necrotic debris and slough  Instrument(s) utilized: curette  Bleeding: medium  Hemostasis obtained with: pressure  Procedural pain (0-10): 4  Post-procedural pain: 1   Response to treatment: procedure was tolerated well      Post-debridement undermining: 10-12 o'clock deepest at 11 o'clock 0.9cm

## 2023-06-07 NOTE — PATIENT INSTRUCTIONS
Orders Placed This Encounter      Debridement          Order Comments: This order was created via procedure documentation      furosemide 40 MG Oral Tab          Sig: Take 1 tablet (40 mg total) by mouth daily. Dispense:  14 tablet          Refill:  0      Potassium Chloride ER 10 MEQ Oral Tab CR          Sig: Take 1 tablet (10 mEq total) by mouth daily for 14 days. Dispense:  14 tablet          Refill:  0      Start lasix / K  HH for dressing changes 3 times a week  Knee brace to immobilize Left knee  Compression garment LLE. Continue wound vac. Wound Cleaning and Dressings:    Wash your hands with soap and water. Always wear gloves while changing dressings. Donot touch wound / stacy-wound skin with un-gloved hands. Remove old dressing, discard and place into trash. DRESSINGS: collagen wound vac   Change dressing  3 times a week    Compression Therapy : Yes, spandagrip  Compression Therapy Instructions:  1. Put on first thing in the morning and may remove at bedside. Okay to wear overnight      if comfortable. Do not let stockings roll up/down and kink. Hand wash stockings and      hang dry as needed. 2.  Avoid prolonged standing in one place. It is better to have your calf muscles moving       to pump fluid out of the legs. 3.  Elevate leg(s) above the level of the heart when sitting or as much as possible. 4.  Take your diuretics as directed by your provider. Do not skip doses or change doses      unless instructed to do so by your provider. 5. Do not get leg(s) with compression wrap wet. If wraps are too tight as indicated        By pain, numbness/tingling or discoloration of toes remove wrap completely       and call the   wound center.        Miscellaneous Instructions:  Supplement with a daily multivitamin   Low salt diet  Increase protein intake / consider protein supplements - see below  Elevate extremities at all times when sitting / laying down.    DIETARY MODIFICATIONS TO HELP WITH WOUND HEALING:    Protein: Meats, beans, eggs, milk and yogurt particularly [place] yogurt), tofu, soy nuts, soy protein products    Vitamin C: Citrus fruits and juices, strawberries, tomatoes, tomato juice, peppers, baked potatoes, spinach, broccoli, cauliflower, [place] sprouts, cabbage    Vitamin A: Dark green, leafy vegetables, orange or yellow vegetables, cantaloupe, fortified dairy products, liver, fortified cereals    Zinc: Fortified cereals, red meats, seafood    Consider Doni by [facility] (These are essential branch chain amino acids that help with tissue building and wound healing) and take 2 packets/day. you can order online at [employer] or [employer] REMINDERS:    The treatment plan has been discussed at length with you and your provider. Follow all instructions carefully, it is very important. If you do not follow all instructions, you are at  risk of your wound not healing, infection, possible loss of limb and even end of life. Please call the clinic during regular business hours ( 7:30 AM - 5:30 PM) if you notice increased bleeding, redness, warmth, pain or pus like drainage or start running a fever greater than 100.3. For after hour emergencies, please call your primary physician or go to the nearest emergency room.

## 2023-06-07 NOTE — PROGRESS NOTES
Weekly Wound Education Note    Teaching Provided To: Patient  Training Topics: Cleasing and general instructions; Compression; Discharge instructions;Dressing;Edema control  Training Method: Explain/Verbal  Training Response: Patient responds and understands; Reinforcement needed        Notes: Stable. Sorbact over exposed tendon, june, NPWT 125mmhg, ACE wrap, spandagrip E (base of toes to knee), knee brace. Pt educated on wound vac and reminded that tubing should not be under any compression or brace as this can cause pressure.

## 2023-06-12 ENCOUNTER — TELEPHONE (OUTPATIENT)
Dept: WOUND CARE | Facility: HOSPITAL | Age: 63
End: 2023-06-12

## 2023-06-12 ENCOUNTER — APPOINTMENT (OUTPATIENT)
Dept: GENERAL RADIOLOGY | Facility: HOSPITAL | Age: 63
DRG: 920 | End: 2023-06-12
Attending: EMERGENCY MEDICINE
Payer: COMMERCIAL

## 2023-06-12 ENCOUNTER — HOSPITAL ENCOUNTER (INPATIENT)
Facility: HOSPITAL | Age: 63
LOS: 18 days | Discharge: NURSING, REHAB OR LONG-TERM CARE | End: 2023-06-30
Attending: EMERGENCY MEDICINE | Admitting: HOSPITALIST
Payer: COMMERCIAL

## 2023-06-12 ENCOUNTER — APPOINTMENT (OUTPATIENT)
Dept: GENERAL RADIOLOGY | Facility: HOSPITAL | Age: 63
End: 2023-06-12
Attending: EMERGENCY MEDICINE
Payer: COMMERCIAL

## 2023-06-12 ENCOUNTER — HOSPITAL ENCOUNTER (INPATIENT)
Facility: HOSPITAL | Age: 63
LOS: 18 days | Discharge: NURSING, REHAB OR LONG-TERM CARE | DRG: 920 | End: 2023-06-30
Attending: EMERGENCY MEDICINE | Admitting: HOSPITALIST
Payer: COMMERCIAL

## 2023-06-12 DIAGNOSIS — L03.116 CELLULITIS OF LEFT LOWER EXTREMITY: ICD-10-CM

## 2023-06-12 DIAGNOSIS — T81.42XA INFECTION OF DEEP INCISIONAL SURGICAL SITE AFTER PROCEDURE, INITIAL ENCOUNTER: Primary | ICD-10-CM

## 2023-06-12 DIAGNOSIS — T81.40XA POST OP INFECTION: ICD-10-CM

## 2023-06-12 LAB
ALBUMIN SERPL-MCNC: 3.4 G/DL (ref 3.4–5)
ALBUMIN/GLOB SERPL: 0.8 {RATIO} (ref 1–2)
ALP LIVER SERPL-CCNC: 65 U/L
ALT SERPL-CCNC: 14 U/L
ANION GAP SERPL CALC-SCNC: 6 MMOL/L (ref 0–18)
AST SERPL-CCNC: 18 U/L (ref 15–37)
BASOPHILS # BLD AUTO: 0.04 X10(3) UL (ref 0–0.2)
BASOPHILS NFR BLD AUTO: 0.6 %
BASOPHILS NFR SNV: 0 %
BILIRUB SERPL-MCNC: 0.2 MG/DL (ref 0.1–2)
BUN BLD-MCNC: 12 MG/DL (ref 7–18)
CALCIUM BLD-MCNC: 9 MG/DL (ref 8.5–10.1)
CHLORIDE SERPL-SCNC: 101 MMOL/L (ref 98–112)
CO2 SERPL-SCNC: 27 MMOL/L (ref 21–32)
CREAT BLD-MCNC: 0.53 MG/DL
CRP SERPL-MCNC: 13.6 MG/DL (ref ?–0.3)
CRYSTALS SNV QL MICRO: NEGATIVE
EOSINOPHIL # BLD AUTO: 0.27 X10(3) UL (ref 0–0.7)
EOSINOPHIL NFR BLD AUTO: 3.7 %
EOSINOPHIL NFR SNV: 0 %
ERYTHROCYTE [DISTWIDTH] IN BLOOD BY AUTOMATED COUNT: 14 %
ERYTHROCYTE [SEDIMENTATION RATE] IN BLOOD: 60 MM/HR
GFR SERPLBLD BASED ON 1.73 SQ M-ARVRAT: 103 ML/MIN/1.73M2 (ref 60–?)
GLOBULIN PLAS-MCNC: 4.1 G/DL (ref 2.8–4.4)
GLUCOSE BLD-MCNC: 82 MG/DL (ref 70–99)
GRANULOCYTES # SNV AUTO: 346 /MM3 (ref 0–200)
HCT VFR BLD AUTO: 29 %
HGB BLD-MCNC: 9.4 G/DL
IMM GRANULOCYTES # BLD AUTO: 0.01 X10(3) UL (ref 0–1)
IMM GRANULOCYTES NFR BLD: 0.1 %
LACTATE SERPL-SCNC: 2 MMOL/L (ref 0.4–2)
LYMPHOCYTES # BLD AUTO: 1.26 X10(3) UL (ref 1–4)
LYMPHOCYTES NFR BLD AUTO: 17.5 %
LYMPHOCYTES NFR SNV: 55 %
MCH RBC QN AUTO: 29.3 PG (ref 26–34)
MCHC RBC AUTO-ENTMCNC: 32.4 G/DL (ref 31–37)
MCV RBC AUTO: 90.3 FL
MONOCYTES # BLD AUTO: 0.84 X10(3) UL (ref 0.1–1)
MONOCYTES NFR BLD AUTO: 11.6 %
MONOS+MACROS NFR SNV: 26 %
NEUTROPHILS # BLD AUTO: 4.8 X10 (3) UL (ref 1.5–7.7)
NEUTROPHILS # BLD AUTO: 4.8 X10(3) UL (ref 1.5–7.7)
NEUTROPHILS NFR BLD AUTO: 66.5 %
NEUTROPHILS NFR SNV: 17 %
OSMOLALITY SERPL CALC.SUM OF ELEC: 277 MOSM/KG (ref 275–295)
PLATELET # BLD AUTO: 310 10(3)UL (ref 150–450)
POTASSIUM SERPL-SCNC: 3.5 MMOL/L (ref 3.5–5.1)
PROT SERPL-MCNC: 7.5 G/DL (ref 6.4–8.2)
RBC # BLD AUTO: 3.21 X10(6)UL
RBC # FLD AUTO: ABNORMAL /MM3 (ref ?–1)
SODIUM SERPL-SCNC: 134 MMOL/L (ref 136–145)
SYNOVIOCYTES NFR SNV: 2 %
TOTAL CELLS COUNTED FLD: 100
WBC # BLD AUTO: 7.2 X10(3) UL (ref 4–11)

## 2023-06-12 PROCEDURE — 83605 ASSAY OF LACTIC ACID: CPT | Performed by: EMERGENCY MEDICINE

## 2023-06-12 PROCEDURE — 96376 TX/PRO/DX INJ SAME DRUG ADON: CPT

## 2023-06-12 PROCEDURE — 73560 X-RAY EXAM OF KNEE 1 OR 2: CPT | Performed by: EMERGENCY MEDICINE

## 2023-06-12 PROCEDURE — 87186 SC STD MICRODIL/AGAR DIL: CPT | Performed by: EMERGENCY MEDICINE

## 2023-06-12 PROCEDURE — 85025 COMPLETE CBC W/AUTO DIFF WBC: CPT | Performed by: EMERGENCY MEDICINE

## 2023-06-12 PROCEDURE — 87205 SMEAR GRAM STAIN: CPT | Performed by: EMERGENCY MEDICINE

## 2023-06-12 PROCEDURE — 87147 CULTURE TYPE IMMUNOLOGIC: CPT | Performed by: EMERGENCY MEDICINE

## 2023-06-12 PROCEDURE — 0S9D3ZZ DRAINAGE OF LEFT KNEE JOINT, PERCUTANEOUS APPROACH: ICD-10-PCS | Performed by: ORTHOPAEDIC SURGERY

## 2023-06-12 PROCEDURE — 99285 EMERGENCY DEPT VISIT HI MDM: CPT

## 2023-06-12 PROCEDURE — 87070 CULTURE OTHR SPECIMN AEROBIC: CPT | Performed by: EMERGENCY MEDICINE

## 2023-06-12 PROCEDURE — 89060 EXAM SYNOVIAL FLUID CRYSTALS: CPT | Performed by: ORTHOPAEDIC SURGERY

## 2023-06-12 PROCEDURE — 87040 BLOOD CULTURE FOR BACTERIA: CPT | Performed by: EMERGENCY MEDICINE

## 2023-06-12 PROCEDURE — 96365 THER/PROPH/DIAG IV INF INIT: CPT

## 2023-06-12 PROCEDURE — 36415 COLL VENOUS BLD VENIPUNCTURE: CPT

## 2023-06-12 PROCEDURE — 86140 C-REACTIVE PROTEIN: CPT | Performed by: EMERGENCY MEDICINE

## 2023-06-12 PROCEDURE — 87070 CULTURE OTHR SPECIMN AEROBIC: CPT | Performed by: ORTHOPAEDIC SURGERY

## 2023-06-12 PROCEDURE — 96375 TX/PRO/DX INJ NEW DRUG ADDON: CPT

## 2023-06-12 PROCEDURE — 89050 BODY FLUID CELL COUNT: CPT | Performed by: ORTHOPAEDIC SURGERY

## 2023-06-12 PROCEDURE — 20610 DRAIN/INJ JOINT/BURSA W/O US: CPT

## 2023-06-12 PROCEDURE — 87205 SMEAR GRAM STAIN: CPT | Performed by: ORTHOPAEDIC SURGERY

## 2023-06-12 PROCEDURE — 80053 COMPREHEN METABOLIC PANEL: CPT | Performed by: EMERGENCY MEDICINE

## 2023-06-12 PROCEDURE — 85652 RBC SED RATE AUTOMATED: CPT | Performed by: EMERGENCY MEDICINE

## 2023-06-12 RX ORDER — PROCHLORPERAZINE EDISYLATE 5 MG/ML
5 INJECTION INTRAMUSCULAR; INTRAVENOUS EVERY 8 HOURS PRN
Status: DISCONTINUED | OUTPATIENT
Start: 2023-06-12 | End: 2023-06-13

## 2023-06-12 RX ORDER — MORPHINE SULFATE 4 MG/ML
4 INJECTION, SOLUTION INTRAMUSCULAR; INTRAVENOUS EVERY 2 HOUR PRN
Status: DISCONTINUED | OUTPATIENT
Start: 2023-06-12 | End: 2023-06-30

## 2023-06-12 RX ORDER — VANCOMYCIN 1.75 GRAM/500 ML IN 0.9 % SODIUM CHLORIDE INTRAVENOUS
25 ONCE
Status: COMPLETED | OUTPATIENT
Start: 2023-06-12 | End: 2023-06-12

## 2023-06-12 RX ORDER — ACETAMINOPHEN 500 MG
500 TABLET ORAL EVERY 4 HOURS PRN
Status: DISCONTINUED | OUTPATIENT
Start: 2023-06-12 | End: 2023-06-30

## 2023-06-12 RX ORDER — MORPHINE SULFATE 2 MG/ML
2 INJECTION, SOLUTION INTRAMUSCULAR; INTRAVENOUS EVERY 2 HOUR PRN
Status: DISCONTINUED | OUTPATIENT
Start: 2023-06-12 | End: 2023-06-30

## 2023-06-12 RX ORDER — HYDROMORPHONE HYDROCHLORIDE 1 MG/ML
0.5 INJECTION, SOLUTION INTRAMUSCULAR; INTRAVENOUS; SUBCUTANEOUS EVERY 30 MIN PRN
Status: COMPLETED | OUTPATIENT
Start: 2023-06-12 | End: 2023-06-13

## 2023-06-12 RX ORDER — HYDROCODONE BITARTRATE AND ACETAMINOPHEN 5; 325 MG/1; MG/1
2 TABLET ORAL EVERY 4 HOURS PRN
Status: DISCONTINUED | OUTPATIENT
Start: 2023-06-12 | End: 2023-06-30

## 2023-06-12 RX ORDER — CELECOXIB 200 MG/1
200 CAPSULE ORAL DAILY
Status: DISCONTINUED | OUTPATIENT
Start: 2023-06-13 | End: 2023-06-30

## 2023-06-12 RX ORDER — HYDROCODONE BITARTRATE AND ACETAMINOPHEN 5; 325 MG/1; MG/1
1 TABLET ORAL EVERY 4 HOURS PRN
Status: DISCONTINUED | OUTPATIENT
Start: 2023-06-12 | End: 2023-06-30

## 2023-06-12 RX ORDER — ATORVASTATIN CALCIUM 20 MG/1
20 TABLET, FILM COATED ORAL NIGHTLY
Status: DISCONTINUED | OUTPATIENT
Start: 2023-06-12 | End: 2023-06-30

## 2023-06-12 RX ORDER — ONDANSETRON 2 MG/ML
4 INJECTION INTRAMUSCULAR; INTRAVENOUS ONCE
Status: COMPLETED | OUTPATIENT
Start: 2023-06-12 | End: 2023-06-12

## 2023-06-12 RX ORDER — ENEMA 19; 7 G/133ML; G/133ML
1 ENEMA RECTAL ONCE AS NEEDED
Status: DISCONTINUED | OUTPATIENT
Start: 2023-06-12 | End: 2023-06-30

## 2023-06-12 RX ORDER — GABAPENTIN 300 MG/1
300 CAPSULE ORAL 3 TIMES DAILY
Status: DISCONTINUED | OUTPATIENT
Start: 2023-06-12 | End: 2023-06-30

## 2023-06-12 RX ORDER — POLYETHYLENE GLYCOL 3350 17 G/17G
17 POWDER, FOR SOLUTION ORAL DAILY PRN
Status: DISCONTINUED | OUTPATIENT
Start: 2023-06-12 | End: 2023-06-13

## 2023-06-12 RX ORDER — MORPHINE SULFATE 2 MG/ML
1 INJECTION, SOLUTION INTRAMUSCULAR; INTRAVENOUS EVERY 2 HOUR PRN
Status: DISCONTINUED | OUTPATIENT
Start: 2023-06-12 | End: 2023-06-30

## 2023-06-12 RX ORDER — BISACODYL 10 MG
10 SUPPOSITORY, RECTAL RECTAL
Status: DISCONTINUED | OUTPATIENT
Start: 2023-06-12 | End: 2023-06-13

## 2023-06-12 RX ORDER — ASPIRIN 81 MG/1
81 TABLET ORAL 2 TIMES DAILY
Status: DISCONTINUED | OUTPATIENT
Start: 2023-06-12 | End: 2023-06-14

## 2023-06-12 RX ORDER — SENNOSIDES 8.6 MG
17.2 TABLET ORAL NIGHTLY PRN
Status: DISCONTINUED | OUTPATIENT
Start: 2023-06-12 | End: 2023-06-30

## 2023-06-12 RX ORDER — ACETAMINOPHEN 325 MG/1
650 TABLET ORAL EVERY 4 HOURS PRN
Status: DISCONTINUED | OUTPATIENT
Start: 2023-06-12 | End: 2023-06-30

## 2023-06-12 RX ORDER — ONDANSETRON 2 MG/ML
4 INJECTION INTRAMUSCULAR; INTRAVENOUS EVERY 6 HOURS PRN
Status: DISCONTINUED | OUTPATIENT
Start: 2023-06-12 | End: 2023-06-13

## 2023-06-12 NOTE — ED INITIAL ASSESSMENT (HPI)
Left knee replacement April 10th 2023. Actual incision site is okay but to the right of the site pt has wound that is deep and not healing. Has been seeing wound care outpt and has had wound vac intermittently. Wound vac removed yesterday around noon. States site is painful. Was instructed by wound care nurse to come in for evaluation today. Denies fevers. States site has clear pink drainage.

## 2023-06-12 NOTE — TELEPHONE ENCOUNTER
Spoke with patient regarding her concerns about the wound being infected, informed her the clinic recommendation is that she go the ER. She states she understands.

## 2023-06-12 NOTE — TELEPHONE ENCOUNTER
Returned call to patient's [address] Aelxander who LVM stating pt was having a lot of pain with the wound vac over the weekend. She states when she saw the patient the leg was red and swollen with increased pain and the wound measuring larger. She states it appears to be infected so she did not reapply the wound vac and instead applied honey. Informed her if the patient looks infected the wound clinic recommends she go to the ER. She states she understands and will let the patient know.

## 2023-06-12 NOTE — ED QUICK NOTES
Orders for admission, patient is aware of plan and ready to go upstairs. Any questions, please call ED  [address] at extension [phone]. Patient Covid vaccination status: Fully vaccinated     COVID Test Ordered in ED: None    COVID Suspicion at Admission: N/A    Running Infusions:  None    Mental Status/LOC at time of transport: A/A/O x 4. Other pertinent information: Vancomycin IVPN held temporarily per ED Physician - Dr. Janette Allen. Awaiting for Dr. Leonides Chavez further instructions - re: for possible Knee aspiration. Will start med once instructed.    CIWA score: N/A   NIH score:  N/A

## 2023-06-13 ENCOUNTER — ANESTHESIA (OUTPATIENT)
Dept: SURGERY | Facility: HOSPITAL | Age: 63
End: 2023-06-13
Payer: COMMERCIAL

## 2023-06-13 ENCOUNTER — ANESTHESIA EVENT (OUTPATIENT)
Dept: SURGERY | Facility: HOSPITAL | Age: 63
End: 2023-06-13
Payer: COMMERCIAL

## 2023-06-13 LAB
ANION GAP SERPL CALC-SCNC: 7 MMOL/L (ref 0–18)
BUN BLD-MCNC: 8 MG/DL (ref 7–18)
CALCIUM BLD-MCNC: 9 MG/DL (ref 8.5–10.1)
CHLORIDE SERPL-SCNC: 103 MMOL/L (ref 98–112)
CO2 SERPL-SCNC: 27 MMOL/L (ref 21–32)
CREAT BLD-MCNC: 0.55 MG/DL
CREAT BLD-MCNC: 0.55 MG/DL
ERYTHROCYTE [DISTWIDTH] IN BLOOD BY AUTOMATED COUNT: 13.9 %
GFR SERPLBLD BASED ON 1.73 SQ M-ARVRAT: 102 ML/MIN/1.73M2 (ref 60–?)
GFR SERPLBLD BASED ON 1.73 SQ M-ARVRAT: 102 ML/MIN/1.73M2 (ref 60–?)
GLUCOSE BLD-MCNC: 145 MG/DL (ref 70–99)
HCT VFR BLD AUTO: 29.7 %
HGB BLD-MCNC: 9.3 G/DL
MCH RBC QN AUTO: 29 PG (ref 26–34)
MCHC RBC AUTO-ENTMCNC: 31.3 G/DL (ref 31–37)
MCV RBC AUTO: 92.5 FL
OSMOLALITY SERPL CALC.SUM OF ELEC: 285 MOSM/KG (ref 275–295)
PLATELET # BLD AUTO: 283 10(3)UL (ref 150–450)
POTASSIUM SERPL-SCNC: 3.6 MMOL/L (ref 3.5–5.1)
RBC # BLD AUTO: 3.21 X10(6)UL
SODIUM SERPL-SCNC: 137 MMOL/L (ref 136–145)
WBC # BLD AUTO: 5.6 X10(3) UL (ref 4–11)

## 2023-06-13 PROCEDURE — 87076 CULTURE ANAEROBE IDENT EACH: CPT | Performed by: INTERNAL MEDICINE

## 2023-06-13 PROCEDURE — 87205 SMEAR GRAM STAIN: CPT | Performed by: ORTHOPAEDIC SURGERY

## 2023-06-13 PROCEDURE — 87070 CULTURE OTHR SPECIMN AEROBIC: CPT | Performed by: INTERNAL MEDICINE

## 2023-06-13 PROCEDURE — 87075 CULTR BACTERIA EXCEPT BLOOD: CPT | Performed by: INTERNAL MEDICINE

## 2023-06-13 PROCEDURE — 87076 CULTURE ANAEROBE IDENT EACH: CPT | Performed by: ORTHOPAEDIC SURGERY

## 2023-06-13 PROCEDURE — 87077 CULTURE AEROBIC IDENTIFY: CPT | Performed by: ORTHOPAEDIC SURGERY

## 2023-06-13 PROCEDURE — 87070 CULTURE OTHR SPECIMN AEROBIC: CPT | Performed by: ORTHOPAEDIC SURGERY

## 2023-06-13 PROCEDURE — 87077 CULTURE AEROBIC IDENTIFY: CPT | Performed by: INTERNAL MEDICINE

## 2023-06-13 PROCEDURE — 87176 TISSUE HOMOGENIZATION CULTR: CPT | Performed by: ORTHOPAEDIC SURGERY

## 2023-06-13 PROCEDURE — 82565 ASSAY OF CREATININE: CPT | Performed by: INTERNAL MEDICINE

## 2023-06-13 PROCEDURE — 80048 BASIC METABOLIC PNL TOTAL CA: CPT | Performed by: INTERNAL MEDICINE

## 2023-06-13 PROCEDURE — 88312 SPECIAL STAINS GROUP 1: CPT | Performed by: ORTHOPAEDIC SURGERY

## 2023-06-13 PROCEDURE — 88304 TISSUE EXAM BY PATHOLOGIST: CPT | Performed by: ORTHOPAEDIC SURGERY

## 2023-06-13 PROCEDURE — 0JBP0ZZ EXCISION OF LEFT LOWER LEG SUBCUTANEOUS TISSUE AND FASCIA, OPEN APPROACH: ICD-10-PCS | Performed by: ANESTHESIOLOGY

## 2023-06-13 PROCEDURE — 87205 SMEAR GRAM STAIN: CPT | Performed by: INTERNAL MEDICINE

## 2023-06-13 PROCEDURE — 87075 CULTR BACTERIA EXCEPT BLOOD: CPT | Performed by: ORTHOPAEDIC SURGERY

## 2023-06-13 PROCEDURE — 85027 COMPLETE CBC AUTOMATED: CPT | Performed by: INTERNAL MEDICINE

## 2023-06-13 RX ORDER — PROCHLORPERAZINE EDISYLATE 5 MG/ML
5 INJECTION INTRAMUSCULAR; INTRAVENOUS EVERY 8 HOURS PRN
Status: DISCONTINUED | OUTPATIENT
Start: 2023-06-13 | End: 2023-06-30

## 2023-06-13 RX ORDER — DEXAMETHASONE SODIUM PHOSPHATE 4 MG/ML
VIAL (ML) INJECTION AS NEEDED
Status: DISCONTINUED | OUTPATIENT
Start: 2023-06-13 | End: 2023-06-13 | Stop reason: SURG

## 2023-06-13 RX ORDER — HYDROMORPHONE HYDROCHLORIDE 1 MG/ML
0.6 INJECTION, SOLUTION INTRAMUSCULAR; INTRAVENOUS; SUBCUTANEOUS EVERY 5 MIN PRN
Status: DISCONTINUED | OUTPATIENT
Start: 2023-06-13 | End: 2023-06-13 | Stop reason: HOSPADM

## 2023-06-13 RX ORDER — DIPHENHYDRAMINE HYDROCHLORIDE 50 MG/ML
25 INJECTION INTRAMUSCULAR; INTRAVENOUS ONCE AS NEEDED
Status: ACTIVE | OUTPATIENT
Start: 2023-06-13 | End: 2023-06-13

## 2023-06-13 RX ORDER — ENEMA 19; 7 G/133ML; G/133ML
1 ENEMA RECTAL ONCE AS NEEDED
Status: DISCONTINUED | OUTPATIENT
Start: 2023-06-13 | End: 2023-06-30

## 2023-06-13 RX ORDER — HYDROMORPHONE HYDROCHLORIDE 1 MG/ML
0.4 INJECTION, SOLUTION INTRAMUSCULAR; INTRAVENOUS; SUBCUTANEOUS EVERY 5 MIN PRN
Status: DISCONTINUED | OUTPATIENT
Start: 2023-06-13 | End: 2023-06-13 | Stop reason: HOSPADM

## 2023-06-13 RX ORDER — DIPHENHYDRAMINE HYDROCHLORIDE 50 MG/ML
INJECTION INTRAMUSCULAR; INTRAVENOUS AS NEEDED
Status: DISCONTINUED | OUTPATIENT
Start: 2023-06-13 | End: 2023-06-13 | Stop reason: SURG

## 2023-06-13 RX ORDER — BISACODYL 10 MG
10 SUPPOSITORY, RECTAL RECTAL
Status: DISCONTINUED | OUTPATIENT
Start: 2023-06-13 | End: 2023-06-30

## 2023-06-13 RX ORDER — HYDROCODONE BITARTRATE AND ACETAMINOPHEN 5; 325 MG/1; MG/1
1 TABLET ORAL ONCE AS NEEDED
Status: DISCONTINUED | OUTPATIENT
Start: 2023-06-13 | End: 2023-06-13 | Stop reason: HOSPADM

## 2023-06-13 RX ORDER — FAMOTIDINE 10 MG/ML
20 INJECTION, SOLUTION INTRAVENOUS 2 TIMES DAILY
Status: DISCONTINUED | OUTPATIENT
Start: 2023-06-13 | End: 2023-06-14

## 2023-06-13 RX ORDER — NALOXONE HYDROCHLORIDE 0.4 MG/ML
80 INJECTION, SOLUTION INTRAMUSCULAR; INTRAVENOUS; SUBCUTANEOUS AS NEEDED
Status: DISCONTINUED | OUTPATIENT
Start: 2023-06-13 | End: 2023-06-13 | Stop reason: HOSPADM

## 2023-06-13 RX ORDER — HEPARIN SODIUM 5000 [USP'U]/ML
5000 INJECTION, SOLUTION INTRAVENOUS; SUBCUTANEOUS EVERY 8 HOURS SCHEDULED
Status: DISCONTINUED | OUTPATIENT
Start: 2023-06-14 | End: 2023-06-30

## 2023-06-13 RX ORDER — DIPHENHYDRAMINE HYDROCHLORIDE 50 MG/ML
12.5 INJECTION INTRAMUSCULAR; INTRAVENOUS EVERY 4 HOURS PRN
Status: DISCONTINUED | OUTPATIENT
Start: 2023-06-13 | End: 2023-06-30

## 2023-06-13 RX ORDER — POLYETHYLENE GLYCOL 3350 17 G/17G
17 POWDER, FOR SOLUTION ORAL DAILY PRN
Status: DISCONTINUED | OUTPATIENT
Start: 2023-06-13 | End: 2023-06-30

## 2023-06-13 RX ORDER — ONDANSETRON 2 MG/ML
4 INJECTION INTRAMUSCULAR; INTRAVENOUS EVERY 6 HOURS PRN
Status: DISCONTINUED | OUTPATIENT
Start: 2023-06-13 | End: 2023-06-30

## 2023-06-13 RX ORDER — SENNOSIDES 8.6 MG
17.2 TABLET ORAL NIGHTLY
Status: DISCONTINUED | OUTPATIENT
Start: 2023-06-13 | End: 2023-06-30

## 2023-06-13 RX ORDER — DOCUSATE SODIUM 100 MG/1
100 CAPSULE, LIQUID FILLED ORAL 2 TIMES DAILY
Status: DISCONTINUED | OUTPATIENT
Start: 2023-06-13 | End: 2023-06-30

## 2023-06-13 RX ORDER — MEPERIDINE HYDROCHLORIDE 25 MG/ML
12.5 INJECTION INTRAMUSCULAR; INTRAVENOUS; SUBCUTANEOUS AS NEEDED
Status: DISCONTINUED | OUTPATIENT
Start: 2023-06-13 | End: 2023-06-13 | Stop reason: HOSPADM

## 2023-06-13 RX ORDER — HYDROMORPHONE HYDROCHLORIDE 1 MG/ML
INJECTION, SOLUTION INTRAMUSCULAR; INTRAVENOUS; SUBCUTANEOUS
Status: DISCONTINUED
Start: 2023-06-13 | End: 2023-06-13 | Stop reason: WASHOUT

## 2023-06-13 RX ORDER — FAMOTIDINE 20 MG/1
20 TABLET, FILM COATED ORAL 2 TIMES DAILY
Status: DISCONTINUED | OUTPATIENT
Start: 2023-06-13 | End: 2023-06-30

## 2023-06-13 RX ORDER — MIDAZOLAM HYDROCHLORIDE 1 MG/ML
1 INJECTION INTRAMUSCULAR; INTRAVENOUS EVERY 5 MIN PRN
Status: DISCONTINUED | OUTPATIENT
Start: 2023-06-13 | End: 2023-06-13 | Stop reason: HOSPADM

## 2023-06-13 RX ORDER — ACETAMINOPHEN 500 MG
1000 TABLET ORAL ONCE AS NEEDED
Status: DISCONTINUED | OUTPATIENT
Start: 2023-06-13 | End: 2023-06-13 | Stop reason: HOSPADM

## 2023-06-13 RX ORDER — DIPHENHYDRAMINE HYDROCHLORIDE 50 MG/ML
12.5 INJECTION INTRAMUSCULAR; INTRAVENOUS AS NEEDED
Status: DISCONTINUED | OUTPATIENT
Start: 2023-06-13 | End: 2023-06-13 | Stop reason: HOSPADM

## 2023-06-13 RX ORDER — PROCHLORPERAZINE EDISYLATE 5 MG/ML
5 INJECTION INTRAMUSCULAR; INTRAVENOUS EVERY 8 HOURS PRN
Status: DISCONTINUED | OUTPATIENT
Start: 2023-06-13 | End: 2023-06-13 | Stop reason: HOSPADM

## 2023-06-13 RX ORDER — DIPHENHYDRAMINE HCL 25 MG
25 CAPSULE ORAL EVERY 4 HOURS PRN
Status: DISCONTINUED | OUTPATIENT
Start: 2023-06-13 | End: 2023-06-30

## 2023-06-13 RX ORDER — HYDROMORPHONE HYDROCHLORIDE 1 MG/ML
0.2 INJECTION, SOLUTION INTRAMUSCULAR; INTRAVENOUS; SUBCUTANEOUS EVERY 5 MIN PRN
Status: DISCONTINUED | OUTPATIENT
Start: 2023-06-13 | End: 2023-06-13 | Stop reason: HOSPADM

## 2023-06-13 RX ORDER — CEFAZOLIN SODIUM/WATER 2 G/20 ML
2 SYRINGE (ML) INTRAVENOUS EVERY 8 HOURS
Status: DISCONTINUED | OUTPATIENT
Start: 2023-06-13 | End: 2023-06-13 | Stop reason: ALTCHOICE

## 2023-06-13 RX ORDER — ONDANSETRON 2 MG/ML
4 INJECTION INTRAMUSCULAR; INTRAVENOUS EVERY 6 HOURS PRN
Status: DISCONTINUED | OUTPATIENT
Start: 2023-06-13 | End: 2023-06-13 | Stop reason: HOSPADM

## 2023-06-13 RX ORDER — HYDROCODONE BITARTRATE AND ACETAMINOPHEN 5; 325 MG/1; MG/1
2 TABLET ORAL ONCE AS NEEDED
Status: DISCONTINUED | OUTPATIENT
Start: 2023-06-13 | End: 2023-06-13 | Stop reason: HOSPADM

## 2023-06-13 RX ORDER — LIDOCAINE HYDROCHLORIDE 10 MG/ML
INJECTION, SOLUTION EPIDURAL; INFILTRATION; INTRACAUDAL; PERINEURAL AS NEEDED
Status: DISCONTINUED | OUTPATIENT
Start: 2023-06-13 | End: 2023-06-13 | Stop reason: SURG

## 2023-06-13 RX ORDER — SODIUM CHLORIDE, SODIUM LACTATE, POTASSIUM CHLORIDE, CALCIUM CHLORIDE 600; 310; 30; 20 MG/100ML; MG/100ML; MG/100ML; MG/100ML
INJECTION, SOLUTION INTRAVENOUS CONTINUOUS
Status: DISCONTINUED | OUTPATIENT
Start: 2023-06-13 | End: 2023-06-13 | Stop reason: HOSPADM

## 2023-06-13 RX ADMIN — SODIUM CHLORIDE: 9 INJECTION, SOLUTION INTRAVENOUS at 18:08:00

## 2023-06-13 RX ADMIN — LIDOCAINE HYDROCHLORIDE 50 MG: 10 INJECTION, SOLUTION EPIDURAL; INFILTRATION; INTRACAUDAL; PERINEURAL at 17:25:00

## 2023-06-13 RX ADMIN — DEXAMETHASONE SODIUM PHOSPHATE 4 MG: 4 MG/ML VIAL (ML) INJECTION at 17:25:00

## 2023-06-13 RX ADMIN — ONDANSETRON 4 MG: 2 INJECTION INTRAMUSCULAR; INTRAVENOUS at 18:01:00

## 2023-06-13 RX ADMIN — DIPHENHYDRAMINE HYDROCHLORIDE 12.5 MG: 50 INJECTION INTRAMUSCULAR; INTRAVENOUS at 17:25:00

## 2023-06-13 NOTE — PLAN OF CARE
Patient alert and oriented. Pain controlled with PO and IV abx. Wound to left knee cultured per MD orders, wound assessed and dressed by wound care this morning. Up with RW and assist for ambulation and to the BR.   NPO since breakfast, had a couple of sips of water with meds. Plan for OR at 1700, pt to start cefepime postop per ID. Plan pending culture results and pt progress postop.

## 2023-06-13 NOTE — OPERATIVE REPORT
[facility]  Report of [address] [facility] Patient Status:  Inpatient    1960 MRN [id number]   [facility] SURGERY Attending Dennis Castellanos MD   Hosp Day # 1 PCP Sarah Mi MD     Preop diagnosis: Left total knee replacement postoperative wound dehiscence  Postoperative diagnosis: Same  Irrigation and debridement left postoperative knee wound dehiscence. Surgeon: Loretta Drake MD  First Assistant: MAHOGANY Hernandez  General anesthesia:  Specimen left knee deep wound culture x2. These cultures are both extra-articular. Tourniquet time less than 30 minutes    Patient was brought to the OR. Was laid in supine position. Anesthesia was given. She is already on her therapeutic vancomycin dosing. Prophylactic antibiotic was not necessary. Arms were positioned by anesthesia. Right leg had SCDs applied. Left leg was prepped and draped in sterile fashion using Betadine soap. Blood was exsanguinated by gravity. Tourniquet was inflated. Left knee wound was examined. There was a opening along the anterior aspect of the knee and then another wound opening about a centimeter in diameter medial to this. The wound edges were debrided. It was felt that the bridging skin between the smaller opening in the big opening basically was nonviable. This was taken down. Rongeur was used to carefully debride the soft tissue at the base. At this point I took the tourniquet down because I wanted to see the bleeding of the soft tissue. I cut out further around the wound edges until the bleeding was observed. The patella tendon was exposed which was remaining intact. This tendon did not look infected or devitalized. Medial retinacular area was explored. Debridement was done using a rongeur and careful fashion. I debrided using rongeur to the base to the fresh bleeding surface. 2 specimens were sent off for culturing.   By the time I was done debriding the entire wound size was 6 cm x 5 cm. We remained extra-articular. At this point, I irrigated the wound using 3 L of fluid in the bulb syringe irrigation. Then I used 2 Irrisept bottles to irrigate out wound as well. At this point with nice clean bleeding bed, I lightly controlled bleeding with little bit using Bovie. There was still continued oozing which I left alone. I placed a 4 x 4 gauze that was moistened with saline fluid. I packed the wound. Then I placed an Adaptic over it with 4 x 4 gauze and an ABD. Webril and Ace wrap was wrapped.     Orlin Patterson MD  [facility]  6/13/2023  6:03 PM

## 2023-06-13 NOTE — CM/SW NOTE
06/13/23 1600   CM/SW Referral Data   Referral Source Social Work (self-referral)   Reason for Referral Discharge planning   Informant EMR;Clinical Staff Member   Patient Status Prior to Admission   Services in place prior to admission [address] Provider [address]   Discharge Needs   Anticipated D/C needs Home health care;Subacute rehab; Infusion care; To be determined     Received voicemail from [facility] that pt is current with them. Pt is a 57 y/o woman with history of TKR in 4/2023 currently admitted with incisional infection. Plan for I&D later today. Spoke with Dr Ross Osborn who indicated possible need for IV abx pending surgical findings. Noted pt did have post op readmission and was discharged to [place] [address] at [facility]. Updates sent to [facility] via [facility]. Await MD and therapy recommendations for further DC planning. / to remain available for support and/or discharge planning.      Emy Yan \[facility] [place] [facility]\""KAREN  Discharge Planner  [phone]

## 2023-06-13 NOTE — ED QUICK NOTES
Orlando fowler served. Pt made aware to be NPO after 8 AM per Dr. Vincenzo Jones since Surgery is scheduled at 1700 tomorrow 6/13/23.

## 2023-06-13 NOTE — PROGRESS NOTES
Reviewed her diagnosis.  is at bedside. We discussed the fact that the knee aspiration cell count results do not reflect deep joint involvement. That said, in the midst of debridement, if I find that the wound does communicate with the knee joint, I will make a full arthrotomy and we will washout the knee with polyethylene change. She understands this possibility. She also understands that probability of needing a muscle flap with a skin grafting by plastic surgery is likely. Indication for today's debridement, potential risks of the surgery including but not limited to infection, wound healing issues, nerve injury, bleeding, chronic pain and limping, loss of function, anesthetic complications were discussed.

## 2023-06-13 NOTE — ANESTHESIA PROCEDURE NOTES
Airway  Date/Time: 6/13/2023 5:28 PM  Urgency: elective      General Information and Staff    Patient location during procedure: OR  Anesthesiologist: Jean Dean MD  Performed: anesthesiologist   Performed by: Jean Dean MD  Authorized by: Jean Dean MD      Indications and Patient Condition  Indications for airway management: anesthesia  Sedation level: deep  Preoxygenated: yes  Patient position: sniffing  Mask difficulty assessment: 1 - vent by mask    Final Airway Details  Final airway type: supraglottic airway      Successful airway: classic  Size 3       Number of attempts at approach: 1

## 2023-06-13 NOTE — PLAN OF CARE
NURSING ADMISSION NOTE      Patient admitted via Cart  Oriented to room. Safety precautions initiated. Bed in low position. Call light in reach. Alert and oriented x4. VSS. On RA. . Tolerating diet. Voiding freely. Denies pain at this time. Wet to dry dressing to left knee C/D/I. Ambulating with min assist, gait belt, and walker. Cultures pending. IV abx as ordered. Plan is NPO 6/13 at 0800 and surgery at 1700 with Dr. Monik Hauser.

## 2023-06-14 ENCOUNTER — APPOINTMENT (OUTPATIENT)
Dept: WOUND CARE | Facility: HOSPITAL | Age: 63
End: 2023-06-14
Attending: INTERNAL MEDICINE
Payer: COMMERCIAL

## 2023-06-14 LAB
ANION GAP SERPL CALC-SCNC: 4 MMOL/L (ref 0–18)
BASOPHILS # BLD AUTO: 0.02 X10(3) UL (ref 0–0.2)
BASOPHILS NFR BLD AUTO: 0.3 %
BUN BLD-MCNC: 13 MG/DL (ref 7–18)
CALCIUM BLD-MCNC: 9.1 MG/DL (ref 8.5–10.1)
CHLORIDE SERPL-SCNC: 105 MMOL/L (ref 98–112)
CO2 SERPL-SCNC: 28 MMOL/L (ref 21–32)
CREAT BLD-MCNC: 0.55 MG/DL
CREAT BLD-MCNC: 0.55 MG/DL
EOSINOPHIL # BLD AUTO: 0 X10(3) UL (ref 0–0.7)
EOSINOPHIL NFR BLD AUTO: 0 %
ERYTHROCYTE [DISTWIDTH] IN BLOOD BY AUTOMATED COUNT: 13.5 %
GFR SERPLBLD BASED ON 1.73 SQ M-ARVRAT: 102 ML/MIN/1.73M2 (ref 60–?)
GFR SERPLBLD BASED ON 1.73 SQ M-ARVRAT: 102 ML/MIN/1.73M2 (ref 60–?)
GLUCOSE BLD-MCNC: 122 MG/DL (ref 70–99)
HCT VFR BLD AUTO: 29.2 %
HGB BLD-MCNC: 9.2 G/DL
IMM GRANULOCYTES # BLD AUTO: 0.04 X10(3) UL (ref 0–1)
IMM GRANULOCYTES NFR BLD: 0.5 %
LYMPHOCYTES # BLD AUTO: 0.89 X10(3) UL (ref 1–4)
LYMPHOCYTES NFR BLD AUTO: 12 %
MAGNESIUM SERPL-MCNC: 2.3 MG/DL (ref 1.6–2.6)
MCH RBC QN AUTO: 28.4 PG (ref 26–34)
MCHC RBC AUTO-ENTMCNC: 31.5 G/DL (ref 31–37)
MCV RBC AUTO: 90.1 FL
MONOCYTES # BLD AUTO: 0.51 X10(3) UL (ref 0.1–1)
MONOCYTES NFR BLD AUTO: 6.9 %
NEUTROPHILS # BLD AUTO: 5.94 X10 (3) UL (ref 1.5–7.7)
NEUTROPHILS # BLD AUTO: 5.94 X10(3) UL (ref 1.5–7.7)
NEUTROPHILS NFR BLD AUTO: 80.3 %
OSMOLALITY SERPL CALC.SUM OF ELEC: 285 MOSM/KG (ref 275–295)
PLATELET # BLD AUTO: 350 10(3)UL (ref 150–450)
POTASSIUM SERPL-SCNC: 4.3 MMOL/L (ref 3.5–5.1)
RBC # BLD AUTO: 3.24 X10(6)UL
SODIUM SERPL-SCNC: 137 MMOL/L (ref 136–145)
VANCOMYCIN PEAK SERPL-MCNC: 38 UG/ML (ref 30–50)
WBC # BLD AUTO: 7.4 X10(3) UL (ref 4–11)

## 2023-06-14 PROCEDURE — 85025 COMPLETE CBC W/AUTO DIFF WBC: CPT | Performed by: HOSPITALIST

## 2023-06-14 PROCEDURE — 80202 ASSAY OF VANCOMYCIN: CPT | Performed by: INTERNAL MEDICINE

## 2023-06-14 PROCEDURE — 80048 BASIC METABOLIC PNL TOTAL CA: CPT | Performed by: HOSPITALIST

## 2023-06-14 PROCEDURE — 82565 ASSAY OF CREATININE: CPT | Performed by: INTERNAL MEDICINE

## 2023-06-14 PROCEDURE — 97165 OT EVAL LOW COMPLEX 30 MIN: CPT

## 2023-06-14 PROCEDURE — 97530 THERAPEUTIC ACTIVITIES: CPT

## 2023-06-14 PROCEDURE — 83735 ASSAY OF MAGNESIUM: CPT | Performed by: HOSPITALIST

## 2023-06-14 PROCEDURE — 97535 SELF CARE MNGMENT TRAINING: CPT

## 2023-06-14 NOTE — PROGRESS NOTES
Followed up regarding plastic surgery consult, called the plastics office and was notified Dr. Rosario Hernández was the on call provider, Dr. Vincenzo Jones of Ortho notified, message sent via [employer] to Dr. Rosario Hernández for new consult, number for Dr. Vincenzo Jones given to Dr. Rosario Hernández to discuss case in the message, currently awaiting response.

## 2023-06-14 NOTE — CM/SW NOTE
06/14/23 1448   Choice of Post-Acute Provider   Informed patient of right to choose their preferred provider Yes   List of appropriate post-acute services provided to patient/family with quality data No - Declined list   Patient/family choice [facility]   Information given to Patient         Met with pt to discuss DC planning. PT recommending [facility] at DC. OT recommending home. ID following for possible IV abx at DC. Plastics consult pending. Provided pt with [facility]IN information for [facility]. Pt has been pleased with services provided by [facility], but feels she will need to go to [facility] for VALENTE at [facility]. Pt very concerned about wound healing given complications since her original surgery. She does not feel she and her  can manage daily dressing changes at home. Discussed that referral can be sent to [facility] to confirm they can accept pt. Insurance Yesi [employer] will be needed. Pt stated if insurance does not approve [facility] she would intend to [employer] and would want to appeal.      Referral sent to [facility] via [place]. Await response and MD recommendations for further DC planning. Jesse Whitehead will be needed. / to remain available for support and/or discharge planning.      Srinivasan Xiao [facility]  Discharge Planner  [phone]

## 2023-06-14 NOTE — PLAN OF CARE
Alert and oriented x4. VSS. On RA. . IS encouraged. Tolerating diet. Denies pain at this time. Ace bandage to left knee C/D/I. PT/OT to see. IV abx as ordered. Voiding via purewick. Call light within reach. New consult for plastics. Paged Dr. Serafin Ohara at [phone] was informed she was not on call and to call ED to see who is on call. Was informed that Dr. Rosario Hernández is on call. Paged Dr. Rosario Hernández and was told he does not do those procedures.  Was informed to page again in AM.

## 2023-06-14 NOTE — PLAN OF CARE
Patient is alert and oriented x4. Pain well controlled at rest. No abnormal complaint of numbness or tingling, has neuropathy at baseline to BLE. Pulses bilateral +2. Plantar and dorsi flexions strong bilateral.  Dressing with acewrap is CDI, wound care to re-eval.  Vital signs in normal range. PT/OT to see. IS and ankle pumps encouraged. Belongings within reach. Encouraged to call for any needs.

## 2023-06-15 LAB
ANION GAP SERPL CALC-SCNC: 6 MMOL/L (ref 0–18)
BASOPHILS # BLD AUTO: 0.05 X10(3) UL (ref 0–0.2)
BASOPHILS NFR BLD AUTO: 0.8 %
BUN BLD-MCNC: 14 MG/DL (ref 7–18)
CALCIUM BLD-MCNC: 8.8 MG/DL (ref 8.5–10.1)
CHLORIDE SERPL-SCNC: 106 MMOL/L (ref 98–112)
CO2 SERPL-SCNC: 27 MMOL/L (ref 21–32)
CREAT BLD-MCNC: 0.57 MG/DL
CREAT BLD-MCNC: 0.57 MG/DL
EOSINOPHIL # BLD AUTO: 0.2 X10(3) UL (ref 0–0.7)
EOSINOPHIL NFR BLD AUTO: 3.3 %
ERYTHROCYTE [DISTWIDTH] IN BLOOD BY AUTOMATED COUNT: 13.8 %
GFR SERPLBLD BASED ON 1.73 SQ M-ARVRAT: 101 ML/MIN/1.73M2 (ref 60–?)
GFR SERPLBLD BASED ON 1.73 SQ M-ARVRAT: 101 ML/MIN/1.73M2 (ref 60–?)
GLUCOSE BLD-MCNC: 85 MG/DL (ref 70–99)
HCT VFR BLD AUTO: 29.3 %
HGB BLD-MCNC: 8.8 G/DL
IMM GRANULOCYTES # BLD AUTO: 0.06 X10(3) UL (ref 0–1)
IMM GRANULOCYTES NFR BLD: 1 %
LYMPHOCYTES # BLD AUTO: 2.12 X10(3) UL (ref 1–4)
LYMPHOCYTES NFR BLD AUTO: 34.8 %
MAGNESIUM SERPL-MCNC: 2.3 MG/DL (ref 1.6–2.6)
MCH RBC QN AUTO: 28.8 PG (ref 26–34)
MCHC RBC AUTO-ENTMCNC: 30 G/DL (ref 31–37)
MCV RBC AUTO: 95.8 FL
MONOCYTES # BLD AUTO: 0.67 X10(3) UL (ref 0.1–1)
MONOCYTES NFR BLD AUTO: 11 %
NEUTROPHILS # BLD AUTO: 3 X10 (3) UL (ref 1.5–7.7)
NEUTROPHILS # BLD AUTO: 3 X10(3) UL (ref 1.5–7.7)
NEUTROPHILS NFR BLD AUTO: 49.1 %
OSMOLALITY SERPL CALC.SUM OF ELEC: 288 MOSM/KG (ref 275–295)
PLATELET # BLD AUTO: 331 10(3)UL (ref 150–450)
POTASSIUM SERPL-SCNC: 3.6 MMOL/L (ref 3.5–5.1)
RBC # BLD AUTO: 3.06 X10(6)UL
SODIUM SERPL-SCNC: 139 MMOL/L (ref 136–145)
VANCOMYCIN TROUGH SERPL-MCNC: 14.8 UG/ML (ref 10–20)
WBC # BLD AUTO: 6.1 X10(3) UL (ref 4–11)

## 2023-06-15 PROCEDURE — 83735 ASSAY OF MAGNESIUM: CPT | Performed by: HOSPITALIST

## 2023-06-15 PROCEDURE — 80202 ASSAY OF VANCOMYCIN: CPT | Performed by: INTERNAL MEDICINE

## 2023-06-15 PROCEDURE — 85025 COMPLETE CBC W/AUTO DIFF WBC: CPT | Performed by: HOSPITALIST

## 2023-06-15 PROCEDURE — 99213 OFFICE O/P EST LOW 20 MIN: CPT

## 2023-06-15 PROCEDURE — 82565 ASSAY OF CREATININE: CPT | Performed by: INTERNAL MEDICINE

## 2023-06-15 PROCEDURE — 80048 BASIC METABOLIC PNL TOTAL CA: CPT | Performed by: HOSPITALIST

## 2023-06-15 RX ORDER — CEFAZOLIN SODIUM/WATER 2 G/20 ML
2 SYRINGE (ML) INTRAVENOUS EVERY 8 HOURS
Status: DISCONTINUED | OUTPATIENT
Start: 2023-06-15 | End: 2023-06-30

## 2023-06-15 NOTE — CM/SW NOTE
Met with pt and informed her that [facility] is willing to accept her for admission pending insurance auth. Per facility, pt's insurance only covers 30 days of VALENTE per calendar year. Pt has 19 days remaining at this time. Pt aware but strongly feels she will need to go to facility for wound care. Discussed plan to initiate request for insurance auth once wound care consult is complete and plan with plastics for possible additional surgery is confirmed. Lucinda Deiters will also be needed. / to remain available for support and/or discharge planning.      [facility]  S:[phone]   [address]  Discharge Planner  [phone]

## 2023-06-15 NOTE — PLAN OF CARE
A&O x4. VSS. 2L O2 nc overnight. . Pain controlled with PO norco. Ambulating with min assist with walker. Voids freely to bathroom. RLE SCDs. Lt knee dressing changed tonight, C/D/I. IV abx infusing as ordered. Cxs pending. Reviewed POC, pain management, and fall precautions with pt. Bed alarm on w/bed in lowest position. Pt reminded to use call light. Verbalized understanding. PT rec VALENTE on dc.

## 2023-06-15 NOTE — PLAN OF CARE
Patient is alert and oriented x4. VSS on RA. Pain is well controlled. No complaint of numbness or tingling. Pulses bilateral +2. Plantar and dorsi flexions moderate bilaterally. Dressing to knee is CDI. Wound care to see. Patient ambulates stand by assist w/ walker. IS and ankle pumps encouraged. Belongings within reach. Encouraged to call for any needs.

## 2023-06-15 NOTE — DISCHARGE INSTRUCTIONS
Daily dressing changes: bacitracin to skin graft followed by Xeroform, followed by ABD, no pressure on muscle and graft site. Xeroform to the LLE medial incision followed by gauze and ACE wrap. Do not ACE wrap the muscle site. Place back in knee immobilizer (3 weeks)    Continue BID Dangle of leg 30 minutes duration    Please call [facility]. Fax: [phone]. Tel: [phone] to schedule an an appointment with infectious disease in 1 to 2 weeks. It is important for you to schedule the follow-up as cultures continuously get updated and antibiotics may need to be adjusted accordingly.

## 2023-06-16 LAB
ANION GAP SERPL CALC-SCNC: 5 MMOL/L (ref 0–18)
BASOPHILS # BLD AUTO: 0.04 X10(3) UL (ref 0–0.2)
BASOPHILS NFR BLD AUTO: 0.7 %
BUN BLD-MCNC: 15 MG/DL (ref 7–18)
CALCIUM BLD-MCNC: 8.7 MG/DL (ref 8.5–10.1)
CHLORIDE SERPL-SCNC: 107 MMOL/L (ref 98–112)
CO2 SERPL-SCNC: 29 MMOL/L (ref 21–32)
CREAT BLD-MCNC: 0.5 MG/DL
CREAT BLD-MCNC: 0.5 MG/DL
EOSINOPHIL # BLD AUTO: 0.36 X10(3) UL (ref 0–0.7)
EOSINOPHIL NFR BLD AUTO: 6.3 %
ERYTHROCYTE [DISTWIDTH] IN BLOOD BY AUTOMATED COUNT: 13.9 %
GFR SERPLBLD BASED ON 1.73 SQ M-ARVRAT: 105 ML/MIN/1.73M2 (ref 60–?)
GFR SERPLBLD BASED ON 1.73 SQ M-ARVRAT: 105 ML/MIN/1.73M2 (ref 60–?)
GLUCOSE BLD-MCNC: 96 MG/DL (ref 70–99)
HCT VFR BLD AUTO: 26.8 %
HGB BLD-MCNC: 8.5 G/DL
IMM GRANULOCYTES # BLD AUTO: 0.09 X10(3) UL (ref 0–1)
IMM GRANULOCYTES NFR BLD: 1.6 %
LYMPHOCYTES # BLD AUTO: 1.73 X10(3) UL (ref 1–4)
LYMPHOCYTES NFR BLD AUTO: 30.1 %
MAGNESIUM SERPL-MCNC: 2.1 MG/DL (ref 1.6–2.6)
MCH RBC QN AUTO: 29 PG (ref 26–34)
MCHC RBC AUTO-ENTMCNC: 31.7 G/DL (ref 31–37)
MCV RBC AUTO: 91.5 FL
MONOCYTES # BLD AUTO: 0.56 X10(3) UL (ref 0.1–1)
MONOCYTES NFR BLD AUTO: 9.8 %
NEUTROPHILS # BLD AUTO: 2.96 X10 (3) UL (ref 1.5–7.7)
NEUTROPHILS # BLD AUTO: 2.96 X10(3) UL (ref 1.5–7.7)
NEUTROPHILS NFR BLD AUTO: 51.5 %
OSMOLALITY SERPL CALC.SUM OF ELEC: 293 MOSM/KG (ref 275–295)
PLATELET # BLD AUTO: 301 10(3)UL (ref 150–450)
POTASSIUM SERPL-SCNC: 3.9 MMOL/L (ref 3.5–5.1)
RBC # BLD AUTO: 2.93 X10(6)UL
SODIUM SERPL-SCNC: 141 MMOL/L (ref 136–145)
WBC # BLD AUTO: 5.7 X10(3) UL (ref 4–11)

## 2023-06-16 PROCEDURE — 97605 NEG PRS WND THER DME<=50SQCM: CPT

## 2023-06-16 PROCEDURE — 83735 ASSAY OF MAGNESIUM: CPT | Performed by: HOSPITALIST

## 2023-06-16 PROCEDURE — 82565 ASSAY OF CREATININE: CPT | Performed by: INTERNAL MEDICINE

## 2023-06-16 PROCEDURE — 80048 BASIC METABOLIC PNL TOTAL CA: CPT | Performed by: HOSPITALIST

## 2023-06-16 PROCEDURE — 85025 COMPLETE CBC W/AUTO DIFF WBC: CPT | Performed by: HOSPITALIST

## 2023-06-16 RX ORDER — HYDROXYCHLOROQUINE SULFATE 200 MG/1
200 TABLET, FILM COATED ORAL 2 TIMES DAILY
Status: DISCONTINUED | OUTPATIENT
Start: 2023-06-16 | End: 2023-06-30

## 2023-06-16 NOTE — WOUND PROGRESS NOTE
[facility]  Inpatient Wound Care Contact Note    Tootie Homero Patient Status:  Inpatient    1960 MRN [id number]   [facility] 3SW-A Attending Luc Youngblood MD   Hosp Day # 4 PCP Constantino Vo MD     Wound vac  set @ 125 mmHg continuous negative pressure applied today to left knee wound as planned. Applied 1 piece of adaptic dressing to cover exposed tendon and 1 piece of black granulation foam over the adaptic. Patient tolerated the procedure well. Will plan a wound vac change on   Upon discharge, wound vac needs to be removed and apply daily - adaptic dressing to wound bed, cover with ABD pad and wrap with kerlix until seen by the next provider [ home health or in a facility]. May continue negative pressure wound therapy at home or in a facility. We will continue to follow this patient while in-house and assist with wound care discharge planning. Please call me at [phone] or page me at [phone] if you have any questions about this consultation and plan of care. If unable to reach me at these, please call the Inpatient Wound Care pager at #[phone].       Thank you,    Ross Delgado RN BSN 3 [facility]  [address] [place] [address] [address]  [phone]  Spectralink: [phone]  Pager: [phone]  VM: [phone]

## 2023-06-16 NOTE — PROGRESS NOTES
59 yo female with RA is  s/p TKR 4/10/23. Doing well with healed wound but with a sizable dry black scab. Wound clinic debrided this down to the level of patellar tendon 5/22. Attempt of wound vac has failed. I further debrided this wound 6/13 down to clean base with patellar tendon still exposed. Tendon appear viable. Clinically she has functioning extensor mechanism with good knee extension. She needs muscle flap with skin grafting. Wound care nurse to reapply wound vac for now. I communicated with her yesterday. Dr Brian Brambila, plastics on call, does not do this procedure. I have been in communication with him daily. [facility]astics declines. Obinna Cole MD    Addendum:  Communicated with Dr. Ammon Taveras this morning. She will evaluate patient today.

## 2023-06-16 NOTE — PROGRESS NOTES
Wound care RN came around this AM and placed wound vac to left knee. 1050: Plastics at bedside to evaluate pt, wound vac to be removed. Wound care RN sent message via secure chat regarding wound vac removal and need to be re-applied. Plastics placed dressing after wound vac removal.       Patient alert and oriented x4. VSS, on RA. 2L via NC at [facility]. SCD's in place. Up x1 person assist, voiding freely in the bathroom. Tolerating regular diet, denies N/V. Plan: wound vac to be re-applied. Await further recs.

## 2023-06-16 NOTE — PROGRESS NOTES
Appreciate plastics eval and plan. Appreciate wound clinic manangement. My worry here is that this patient has RA and has had this wound for several weeks with patellar tendon exposed. In addition, there was a component of wound infection. I debrided this wound to clean base. There needs to be careful monitoring of infection and patellar tendon viability. I feel that this patient needs close monitoring by ID and wound care over the course of next several days leading up to surgery next week.      Suzette Thomas MD

## 2023-06-17 LAB
CREAT BLD-MCNC: 0.54 MG/DL
GFR SERPLBLD BASED ON 1.73 SQ M-ARVRAT: 103 ML/MIN/1.73M2 (ref 60–?)

## 2023-06-17 PROCEDURE — 82565 ASSAY OF CREATININE: CPT | Performed by: INTERNAL MEDICINE

## 2023-06-17 RX ORDER — CHOLECALCIFEROL (VITAMIN D3) 125 MCG
2000 CAPSULE ORAL DAILY
Status: DISCONTINUED | OUTPATIENT
Start: 2023-06-17 | End: 2023-06-30

## 2023-06-17 NOTE — PLAN OF CARE
Patient alert and oriented x4. VSS on RA. Pain controlled with PRN PO pain meds. Denies n/t. Wound vac to left knee in place. Knee immobilizer when out of bed. Right SCD in place. IS encouraged. Patient up x1 with walker. Tolerating diet, no c/o n/v. Voiding freely in bathroom.      Plan: Wound vac, plan for surgery with plastic surgery

## 2023-06-17 NOTE — PLAN OF CARE
A&Ox4 vital signs stable on RA. Tolerating diet, SCD to RLE, IS encouraged. Ambulating well with stand by assist and front wheel walker. Wound vac to L knee. Mild c/o pain, managed with PRN medication. POC discussed, patient verbalized understanding. No further questions at this time. Call light within reach.

## 2023-06-18 ENCOUNTER — APPOINTMENT (OUTPATIENT)
Dept: ULTRASOUND IMAGING | Facility: HOSPITAL | Age: 63
DRG: 920 | End: 2023-06-18
Attending: INTERNAL MEDICINE
Payer: COMMERCIAL

## 2023-06-18 ENCOUNTER — APPOINTMENT (OUTPATIENT)
Dept: ULTRASOUND IMAGING | Facility: HOSPITAL | Age: 63
End: 2023-06-18
Attending: INTERNAL MEDICINE
Payer: COMMERCIAL

## 2023-06-18 LAB
CREAT BLD-MCNC: 0.55 MG/DL
GFR SERPLBLD BASED ON 1.73 SQ M-ARVRAT: 102 ML/MIN/1.73M2 (ref 60–?)

## 2023-06-18 PROCEDURE — 82565 ASSAY OF CREATININE: CPT | Performed by: INTERNAL MEDICINE

## 2023-06-18 PROCEDURE — 93971 EXTREMITY STUDY: CPT | Performed by: INTERNAL MEDICINE

## 2023-06-18 NOTE — PLAN OF CARE
Patient alert and oriented x4. VSS on RA. Pain controlled with PO PRN pain meds. Dressing to left knee, CDI. Wound vac in place. Immobilizer worn when out ofbed. Right SCD in place. Ankle pumps encouraged. IS encouraged. Pt up x1 with the walker. Voiding freely in the bathroom. Tolerating diet, no c/o n/v.     Plan: surgery with plastics on 6/21.  Ultrasound doppler of LLE

## 2023-06-18 NOTE — PLAN OF CARE
A&Ox4 vital signs stable on RA. Tolerating diet, SCD to RLE, IS encouraged. Ambulating well with stand by assist and front wheel walker. Wound vac to L knee. Immobilizer worn when out of bed. Mild c/o pain, managed with PRN medication. POC discussed, patient verbalized understanding. No further questions at this time.  Call light within reach

## 2023-06-19 LAB
CREAT BLD-MCNC: 0.54 MG/DL
GFR SERPLBLD BASED ON 1.73 SQ M-ARVRAT: 103 ML/MIN/1.73M2 (ref 60–?)

## 2023-06-19 PROCEDURE — 82565 ASSAY OF CREATININE: CPT | Performed by: INTERNAL MEDICINE

## 2023-06-19 RX ORDER — METRONIDAZOLE 500 MG/1
500 TABLET ORAL EVERY 8 HOURS SCHEDULED
Status: DISCONTINUED | OUTPATIENT
Start: 2023-06-19 | End: 2023-06-30

## 2023-06-19 NOTE — PLAN OF CARE
Pt A&O. On room air. Scds to RLE. Tolerating diet with good appetite. Had BM today. Voiding w/o difficulty. Pain managed with oral tylenol. Pt reminded to \"call; don't fall\". Ambulating in halls with min assist using walker and gait belt. KI on when up. Left knee drsg C/D/I. Wound vac in place. Wound care RN to change drsg today. Tolerating atbx as ordered. Oral Flagyl added today per ID. Plan is to go back to surgery on Wednesday.

## 2023-06-19 NOTE — PROGRESS NOTES
06/19/23 0949   Clinical Encounter Type   Visited With Patient   Routine Visit Introduction   Continue Visiting No   Referral From Other (Comment)   Referral To    Taxonomy   Intended Effects Build relationship of care and support   Methods Offer spiritual/Zoroastrian support   Interventions Explain  role   Trigger for Consult   Trigger for Spiritual Care Consult No     The  responded to the spiritual care 7 day trigger list.   explained the reason for the visit and services provided by the spiritual care department. Patient  is actively visiting patient. Services not needed at the time. Spiritual Care support can be requested via an Epic consult.        [address]

## 2023-06-19 NOTE — PLAN OF CARE
Alert and oriented x4. VSS. On RA. . IS encouraged. SCD on RLE. Tolerating diet. Denies pain at this time. Wound vac in place. Ambulating with min assist. Knee immobilizer when OOB. Voiding freely. IV abx as ordered. Plan is surgery with plastics on 6/21. Call light within reach.

## 2023-06-20 ENCOUNTER — ANESTHESIA EVENT (OUTPATIENT)
Dept: SURGERY | Facility: HOSPITAL | Age: 63
End: 2023-06-20
Payer: COMMERCIAL

## 2023-06-20 ENCOUNTER — ANESTHESIA EVENT (OUTPATIENT)
Dept: SURGERY | Facility: HOSPITAL | Age: 63
DRG: 920 | End: 2023-06-20
Payer: COMMERCIAL

## 2023-06-20 ENCOUNTER — ANESTHESIA EVENT (OUTPATIENT)
Dept: SURGERY | Facility: HOSPITAL | Age: 63
DRG: 857 | End: 2023-06-20
Payer: COMMERCIAL

## 2023-06-20 LAB
CREAT BLD-MCNC: 0.63 MG/DL
GFR SERPLBLD BASED ON 1.73 SQ M-ARVRAT: 99 ML/MIN/1.73M2 (ref 60–?)

## 2023-06-20 PROCEDURE — 82565 ASSAY OF CREATININE: CPT | Performed by: INTERNAL MEDICINE

## 2023-06-20 NOTE — PLAN OF CARE
Alert and oriented x4. VSS. On RA. . IS encouraged. SCD on RLE. Tolerating diet. Pain controlled with PRN medication. Wound vac in place. Ambulating with min assist. Knee immobilizer when OOB. Voiding freely. IV abx as ordered. Plan is surgery with plastics on 6/21. Call light within reach.

## 2023-06-20 NOTE — PLAN OF CARE
Pt a/ox4. Pain well controlled with PO tylenol or norco. VSS. wound vac to left knee, dressing changed 6/19/23 by wound care RN. Immobilizer to left knee. WBAT. Up with assist and walker, ambulating with staff in [place] and room. Plan for NPO at [facility] for OR tomorrow with plastics. Hold heparin tomorrow for OR. Continuing IV ancef and PO flagyl per ID recommendations. Plan home vs [facility] once patient is cleared for discharge.

## 2023-06-21 ENCOUNTER — ANESTHESIA (OUTPATIENT)
Dept: SURGERY | Facility: HOSPITAL | Age: 63
DRG: 857 | End: 2023-06-21
Payer: COMMERCIAL

## 2023-06-21 ENCOUNTER — ANESTHESIA (OUTPATIENT)
Dept: SURGERY | Facility: HOSPITAL | Age: 63
DRG: 920 | End: 2023-06-21
Payer: COMMERCIAL

## 2023-06-21 ENCOUNTER — ANESTHESIA (OUTPATIENT)
Dept: SURGERY | Facility: HOSPITAL | Age: 63
End: 2023-06-21
Payer: COMMERCIAL

## 2023-06-21 LAB
CREAT BLD-MCNC: 0.65 MG/DL
GFR SERPLBLD BASED ON 1.73 SQ M-ARVRAT: 98 ML/MIN/1.73M2 (ref 60–?)

## 2023-06-21 PROCEDURE — 0KXT0ZZ TRANSFER LEFT LOWER LEG MUSCLE, OPEN APPROACH: ICD-10-PCS | Performed by: PLASTIC SURGERY

## 2023-06-21 PROCEDURE — 0HRLX74 REPLACEMENT OF LEFT LOWER LEG SKIN WITH AUTOLOGOUS TISSUE SUBSTITUTE, PARTIAL THICKNESS, EXTERNAL APPROACH: ICD-10-PCS | Performed by: PLASTIC SURGERY

## 2023-06-21 PROCEDURE — 82565 ASSAY OF CREATININE: CPT | Performed by: INTERNAL MEDICINE

## 2023-06-21 RX ORDER — HYDROMORPHONE HYDROCHLORIDE 1 MG/ML
INJECTION, SOLUTION INTRAMUSCULAR; INTRAVENOUS; SUBCUTANEOUS
Status: COMPLETED
Start: 2023-06-21 | End: 2023-06-21

## 2023-06-21 RX ORDER — MIDAZOLAM HYDROCHLORIDE 1 MG/ML
INJECTION INTRAMUSCULAR; INTRAVENOUS
Status: COMPLETED
Start: 2023-06-21 | End: 2023-06-21

## 2023-06-21 RX ORDER — LIDOCAINE HYDROCHLORIDE 10 MG/ML
INJECTION, SOLUTION EPIDURAL; INFILTRATION; INTRACAUDAL; PERINEURAL AS NEEDED
Status: DISCONTINUED | OUTPATIENT
Start: 2023-06-21 | End: 2023-06-21 | Stop reason: SURG

## 2023-06-21 RX ORDER — MEPERIDINE HYDROCHLORIDE 25 MG/ML
12.5 INJECTION INTRAMUSCULAR; INTRAVENOUS; SUBCUTANEOUS AS NEEDED
Status: DISCONTINUED | OUTPATIENT
Start: 2023-06-21 | End: 2023-06-21 | Stop reason: HOSPADM

## 2023-06-21 RX ORDER — SODIUM CHLORIDE, SODIUM LACTATE, POTASSIUM CHLORIDE, CALCIUM CHLORIDE 600; 310; 30; 20 MG/100ML; MG/100ML; MG/100ML; MG/100ML
INJECTION, SOLUTION INTRAVENOUS CONTINUOUS PRN
Status: DISCONTINUED | OUTPATIENT
Start: 2023-06-21 | End: 2023-06-21 | Stop reason: SURG

## 2023-06-21 RX ORDER — ACETAMINOPHEN 500 MG
1000 TABLET ORAL ONCE AS NEEDED
Status: DISCONTINUED | OUTPATIENT
Start: 2023-06-21 | End: 2023-06-21 | Stop reason: HOSPADM

## 2023-06-21 RX ORDER — HYDROMORPHONE HYDROCHLORIDE 1 MG/ML
0.2 INJECTION, SOLUTION INTRAMUSCULAR; INTRAVENOUS; SUBCUTANEOUS EVERY 5 MIN PRN
Status: DISCONTINUED | OUTPATIENT
Start: 2023-06-21 | End: 2023-06-21 | Stop reason: HOSPADM

## 2023-06-21 RX ORDER — NEOSTIGMINE METHYLSULFATE 1 MG/ML
INJECTION, SOLUTION INTRAVENOUS AS NEEDED
Status: DISCONTINUED | OUTPATIENT
Start: 2023-06-21 | End: 2023-06-21 | Stop reason: SURG

## 2023-06-21 RX ORDER — ROCURONIUM BROMIDE 10 MG/ML
INJECTION, SOLUTION INTRAVENOUS AS NEEDED
Status: DISCONTINUED | OUTPATIENT
Start: 2023-06-21 | End: 2023-06-21 | Stop reason: SURG

## 2023-06-21 RX ORDER — LIDOCAINE HYDROCHLORIDE AND EPINEPHRINE 10; 10 MG/ML; UG/ML
INJECTION, SOLUTION INFILTRATION; PERINEURAL AS NEEDED
Status: DISCONTINUED | OUTPATIENT
Start: 2023-06-21 | End: 2023-06-21 | Stop reason: HOSPADM

## 2023-06-21 RX ORDER — HYDROMORPHONE HYDROCHLORIDE 1 MG/ML
0.4 INJECTION, SOLUTION INTRAMUSCULAR; INTRAVENOUS; SUBCUTANEOUS EVERY 5 MIN PRN
Status: DISCONTINUED | OUTPATIENT
Start: 2023-06-21 | End: 2023-06-21 | Stop reason: HOSPADM

## 2023-06-21 RX ORDER — ONDANSETRON 2 MG/ML
INJECTION INTRAMUSCULAR; INTRAVENOUS AS NEEDED
Status: DISCONTINUED | OUTPATIENT
Start: 2023-06-21 | End: 2023-06-21 | Stop reason: SURG

## 2023-06-21 RX ORDER — GLYCOPYRROLATE 0.2 MG/ML
INJECTION, SOLUTION INTRAMUSCULAR; INTRAVENOUS AS NEEDED
Status: DISCONTINUED | OUTPATIENT
Start: 2023-06-21 | End: 2023-06-21 | Stop reason: SURG

## 2023-06-21 RX ORDER — DIPHENHYDRAMINE HYDROCHLORIDE 50 MG/ML
12.5 INJECTION INTRAMUSCULAR; INTRAVENOUS AS NEEDED
Status: DISCONTINUED | OUTPATIENT
Start: 2023-06-21 | End: 2023-06-21 | Stop reason: HOSPADM

## 2023-06-21 RX ORDER — CEFAZOLIN SODIUM 1 G/3ML
INJECTION, POWDER, FOR SOLUTION INTRAMUSCULAR; INTRAVENOUS AS NEEDED
Status: DISCONTINUED | OUTPATIENT
Start: 2023-06-21 | End: 2023-06-21 | Stop reason: SURG

## 2023-06-21 RX ORDER — HYDROCODONE BITARTRATE AND ACETAMINOPHEN 5; 325 MG/1; MG/1
2 TABLET ORAL ONCE AS NEEDED
Status: DISCONTINUED | OUTPATIENT
Start: 2023-06-21 | End: 2023-06-21 | Stop reason: HOSPADM

## 2023-06-21 RX ORDER — PROCHLORPERAZINE EDISYLATE 5 MG/ML
INJECTION INTRAMUSCULAR; INTRAVENOUS
Status: COMPLETED
Start: 2023-06-21 | End: 2023-06-21

## 2023-06-21 RX ORDER — ONDANSETRON 2 MG/ML
4 INJECTION INTRAMUSCULAR; INTRAVENOUS EVERY 6 HOURS PRN
Status: DISCONTINUED | OUTPATIENT
Start: 2023-06-21 | End: 2023-06-21 | Stop reason: HOSPADM

## 2023-06-21 RX ORDER — HYDROCODONE BITARTRATE AND ACETAMINOPHEN 5; 325 MG/1; MG/1
1 TABLET ORAL ONCE AS NEEDED
Status: DISCONTINUED | OUTPATIENT
Start: 2023-06-21 | End: 2023-06-21 | Stop reason: HOSPADM

## 2023-06-21 RX ORDER — NALOXONE HYDROCHLORIDE 0.4 MG/ML
80 INJECTION, SOLUTION INTRAMUSCULAR; INTRAVENOUS; SUBCUTANEOUS AS NEEDED
Status: DISCONTINUED | OUTPATIENT
Start: 2023-06-21 | End: 2023-06-21 | Stop reason: HOSPADM

## 2023-06-21 RX ORDER — HYDROMORPHONE HYDROCHLORIDE 1 MG/ML
0.6 INJECTION, SOLUTION INTRAMUSCULAR; INTRAVENOUS; SUBCUTANEOUS EVERY 5 MIN PRN
Status: DISCONTINUED | OUTPATIENT
Start: 2023-06-21 | End: 2023-06-21 | Stop reason: HOSPADM

## 2023-06-21 RX ORDER — ACETAMINOPHEN 10 MG/ML
1000 INJECTION, SOLUTION INTRAVENOUS ONCE
Status: COMPLETED | OUTPATIENT
Start: 2023-06-21 | End: 2023-06-21

## 2023-06-21 RX ORDER — MINERAL OIL
OIL (ML) MISCELLANEOUS AS NEEDED
Status: DISCONTINUED | OUTPATIENT
Start: 2023-06-21 | End: 2023-06-21 | Stop reason: HOSPADM

## 2023-06-21 RX ORDER — MIDAZOLAM HYDROCHLORIDE 1 MG/ML
1 INJECTION INTRAMUSCULAR; INTRAVENOUS EVERY 5 MIN PRN
Status: DISCONTINUED | OUTPATIENT
Start: 2023-06-21 | End: 2023-06-21 | Stop reason: HOSPADM

## 2023-06-21 RX ORDER — PROCHLORPERAZINE EDISYLATE 5 MG/ML
5 INJECTION INTRAMUSCULAR; INTRAVENOUS EVERY 8 HOURS PRN
Status: DISCONTINUED | OUTPATIENT
Start: 2023-06-21 | End: 2023-06-21 | Stop reason: HOSPADM

## 2023-06-21 RX ORDER — ACETAMINOPHEN 10 MG/ML
INJECTION, SOLUTION INTRAVENOUS
Status: COMPLETED
Start: 2023-06-21 | End: 2023-06-21

## 2023-06-21 RX ORDER — SODIUM CHLORIDE, SODIUM LACTATE, POTASSIUM CHLORIDE, CALCIUM CHLORIDE 600; 310; 30; 20 MG/100ML; MG/100ML; MG/100ML; MG/100ML
INJECTION, SOLUTION INTRAVENOUS CONTINUOUS
Status: DISCONTINUED | OUTPATIENT
Start: 2023-06-21 | End: 2023-06-21 | Stop reason: HOSPADM

## 2023-06-21 RX ORDER — METOCLOPRAMIDE HYDROCHLORIDE 5 MG/ML
INJECTION INTRAMUSCULAR; INTRAVENOUS AS NEEDED
Status: DISCONTINUED | OUTPATIENT
Start: 2023-06-21 | End: 2023-06-21 | Stop reason: SURG

## 2023-06-21 RX ADMIN — LIDOCAINE HYDROCHLORIDE 50 MG: 10 INJECTION, SOLUTION EPIDURAL; INFILTRATION; INTRACAUDAL; PERINEURAL at 15:30:00

## 2023-06-21 RX ADMIN — GLYCOPYRROLATE 0.4 MG: 0.2 INJECTION, SOLUTION INTRAMUSCULAR; INTRAVENOUS at 15:30:00

## 2023-06-21 RX ADMIN — METOCLOPRAMIDE HYDROCHLORIDE 10 MG: 5 INJECTION INTRAMUSCULAR; INTRAVENOUS at 13:45:00

## 2023-06-21 RX ADMIN — ROCURONIUM BROMIDE 30 MG: 10 INJECTION, SOLUTION INTRAVENOUS at 13:33:00

## 2023-06-21 RX ADMIN — CEFAZOLIN SODIUM 2 G: 1 INJECTION, POWDER, FOR SOLUTION INTRAMUSCULAR; INTRAVENOUS at 13:49:00

## 2023-06-21 RX ADMIN — ROCURONIUM BROMIDE 20 MG: 10 INJECTION, SOLUTION INTRAVENOUS at 13:59:00

## 2023-06-21 RX ADMIN — NEOSTIGMINE METHYLSULFATE 3 MG: 1 INJECTION, SOLUTION INTRAVENOUS at 15:30:00

## 2023-06-21 RX ADMIN — SODIUM CHLORIDE, SODIUM LACTATE, POTASSIUM CHLORIDE, CALCIUM CHLORIDE: 600; 310; 30; 20 INJECTION, SOLUTION INTRAVENOUS at 13:32:00

## 2023-06-21 RX ADMIN — LIDOCAINE HYDROCHLORIDE 50 MG: 10 INJECTION, SOLUTION EPIDURAL; INFILTRATION; INTRACAUDAL; PERINEURAL at 13:33:00

## 2023-06-21 RX ADMIN — ONDANSETRON 4 MG: 2 INJECTION INTRAMUSCULAR; INTRAVENOUS at 15:23:00

## 2023-06-21 RX ADMIN — SODIUM CHLORIDE, SODIUM LACTATE, POTASSIUM CHLORIDE, CALCIUM CHLORIDE: 600; 310; 30; 20 INJECTION, SOLUTION INTRAVENOUS at 15:24:00

## 2023-06-21 NOTE — PLAN OF CARE
Problem: SAFETY ADULT - FALL  Goal: Free from fall injury  Description: INTERVENTIONS:  - Assess pt frequently for physical needs  - Identify cognitive and physical deficits and behaviors that affect risk of falls. - [place] fall precautions as indicated by assessment.  - Educate pt/family on patient safety including physical limitations  - Instruct pt to call for assistance with activity based on assessment  - Modify environment to reduce risk of injury  - Provide assistive devices as appropriate  - Consider OT/PT consult to assist with strengthening/mobility  - Encourage toileting schedule  Outcome: Progressing   Patient NPO, IV pain medication ordered as needed.

## 2023-06-21 NOTE — CM/SW NOTE
Patient scheduled for LEFT LEG MEDIAL GASTROCNEMIUS FLAP WITH SPLIT THICKNESS SKIN GRAFT procedure today. CM spoke to Plastic Surgery team for update regarding discharge planning needs; CM informed patient will have bedrest ordered for 2 days post procedure and will need therapy re-evaluation (limited activity status of left leg). CM also informed wound care will not be needed. Floor SW updated. CM/SW to follow up for further dc planning.       Annabelle Noguera RN Case Manager [id number]

## 2023-06-21 NOTE — PROGRESS NOTES
Keep patient NPO, scheduled for surgery today for LEFT LEG MEDIAL GASTROCNEMIUS FLAP WITH SPLIT THICKNESS SKIN GRAFT. Left knee with wound vac,  Serosanguineous drainage noted. Wound vac dressing looks clean, dry and intact. Will have patient ready for surgery.

## 2023-06-21 NOTE — PROGRESS NOTES
Patient received from PACU post LEFT LEG MEDIAL GASTROCNEMIUS FLAP WITH SPLIT THICKNESS SKIN GRAFT. Received awake, alert and oriented x 4, spouse at the bedside. Left leg with ace wrap dressing, NATALIA drain and knee immobilizer on. Donor site to left thigh just above the knee with tegaderm and ABD dressing. Per Dr. Charline Echevarria patient will be on bedrest for 5 days and to keep left leg elevated in a pillow. Dressing will be change by Plastic surgeon only. Discussed post surgical orders with patient and spouse, both verbalized understanding.

## 2023-06-21 NOTE — ANESTHESIA PROCEDURE NOTES
Airway  Date/Time: 6/21/2023 1:36 PM  Urgency: elective    Airway not difficult    General Information and Staff    Patient location during procedure: OR  Anesthesiologist: Carmen Eugene MD  Resident/CRNA: Sally Mcgill CRNA  Performed: CRNA   Performed by: Sally Mcgill CRNA  Authorized by: Carmen Eugene MD      Indications and Patient Condition  Indications for airway management: anesthesia  Spontaneous Ventilation: absent  Sedation level: deep  Preoxygenated: yes  Patient position: sniffing  Mask difficulty assessment: 1 - vent by mask    Final Airway Details  Final airway type: endotracheal airway      Successful airway: ETT  Cuffed: yes   Successful intubation technique: Video laryngoscopy  Endotracheal tube insertion site: oral  Blade: GlideScope  Blade size: #3  ETT size (mm): 7.0    Cormack-Lehane Classification: grade I - full view of glottis  Placement verified by: capnometry   Measured from: lips  ETT to lips (cm): 21  Number of attempts at approach: 1    Additional Comments  Elective use of [place] video laryngoscope d/t decreased CROM. Atraumatic intubation.

## 2023-06-21 NOTE — PLAN OF CARE
Alert and oriented x4. VSS. On RA. . IS encouraged. SCD on RLE. Tolerating diet. Pain controlled with PRN medication. Wound vac in place. Ambulating with min assist. Knee immobilizer when OOB. Voiding freely. IV abx as ordered. Plan for NPO at midnight for OR tomorrow with plastics. Call light within reach.

## 2023-06-22 LAB
CREAT BLD-MCNC: 0.55 MG/DL
GFR SERPLBLD BASED ON 1.73 SQ M-ARVRAT: 102 ML/MIN/1.73M2 (ref 60–?)

## 2023-06-22 PROCEDURE — 82565 ASSAY OF CREATININE: CPT | Performed by: PLASTIC SURGERY

## 2023-06-22 NOTE — PROGRESS NOTES
Left lower leg with ace wrap, ABD dressing and knee immobilizer. No pressure applied to left knee. Left upper thigh skin graft donor site with Tegaderm dressing, looks red with scant amount of blood. Per Plastic surgeon, patient may sit up in the chair but keep LLE elevated at all times. Plastic surgeon checked surgical site, bacitracin ointment applied to graft site. SCD to right leg and Heparin subcutaneous given as ordered for DVT prophylaxis.

## 2023-06-22 NOTE — PLAN OF CARE
Patient A&Ox4 , vital signs stable . On bedrest . Dressing to left knee clean and dry , NATALIA drain to gravity with small amount of drainage . Knee immobilizer on all the time . Left leg elevated on pillow . Left thigh donor site with some drainage in the dressing . Pain is controlled with norco . Voiding per lucian . All safety precautions in place . Reminded to \"call don't fall\" . Will continue to monitor .

## 2023-06-22 NOTE — PROGRESS NOTES
Patient is s/p left TKR muscle flap and skin grafting. Patient reports no major issues. Activity per Plastics. No active input from ortho at this stage.   Will check back next week if still in hospital.    Ashley Reyes MD

## 2023-06-22 NOTE — PLAN OF CARE
Problem: PAIN - ADULT  Goal: Verbalizes/displays adequate comfort level or patient's stated pain goal  Description: INTERVENTIONS:  - Encourage pt to monitor pain and request assistance  - Assess pain using appropriate pain scale  - Administer analgesics based on type and severity of pain and evaluate response  - Implement non-pharmacological measures as appropriate and evaluate response  - Consider cultural and social influences on pain and pain management  - Manage/alleviate anxiety  - Utilize distraction and/or relaxation techniques  - Monitor for opioid side effects  - Notify MD/LIP if interventions unsuccessful or patient reports new pain  - Anticipate increased pain with activity and pre-medicate as appropriate  Outcome: Progressing   Verbalized Norco 5/325 mg 1 tablet gives her adequate pain relief.

## 2023-06-23 LAB
CREAT BLD-MCNC: 0.63 MG/DL
GFR SERPLBLD BASED ON 1.73 SQ M-ARVRAT: 99 ML/MIN/1.73M2 (ref 60–?)
PLATELET # BLD AUTO: 335 10(3)UL (ref 150–450)

## 2023-06-23 PROCEDURE — 85049 AUTOMATED PLATELET COUNT: CPT | Performed by: PHYSICIAN ASSISTANT

## 2023-06-23 PROCEDURE — 85014 HEMATOCRIT: CPT | Performed by: STUDENT IN AN ORGANIZED HEALTH CARE EDUCATION/TRAINING PROGRAM

## 2023-06-23 PROCEDURE — 85018 HEMOGLOBIN: CPT | Performed by: STUDENT IN AN ORGANIZED HEALTH CARE EDUCATION/TRAINING PROGRAM

## 2023-06-23 PROCEDURE — 82565 ASSAY OF CREATININE: CPT | Performed by: PLASTIC SURGERY

## 2023-06-23 NOTE — PLAN OF CARE
Patient A&Ox4 , remains on bedrest . Left leg elevated on pillow . Dressing to left knee clean and dry , immobilizer in place . NATALIA intact to suction with small amount of drainage. Left thigh donor site with some drainage , reinforced with tegaderm. Scd,s to right leg . Encouraged ankle pumps and use of IS . On iv and po antibiotics. Using o2 at night when sleeping . Blood pressure stable . Voiding per purewick . Pain is controlled with norco. All safety precautions in place . Reminded to \"call don't fall' . Will continue to monitor . Plan for [facility] when ready .

## 2023-06-23 NOTE — PLAN OF CARE
Alert and oriented x 4. Vitals stable on room air. Left leg pain managed on PO Rand. Reports chronic neuropathy to hands and feet. Left leg dressings intact. Small amount of serosanguinous drainage to left thigh site. Drain in place to surgical site with scant amount of output. Voiding without difficulty. Regular diet. SCD to right leg. Bedrest maintained. Safety precautions in place. Plan of care discussed with patient.

## 2023-06-23 NOTE — CM/SW NOTE
PASSR completed and attached to aidin referral to [facility]- will need to send more therapy notes once pt able to participate in therapy.     Antonella Groves, VEONW  /Discharge Planner

## 2023-06-24 LAB
ANION GAP SERPL CALC-SCNC: 2 MMOL/L (ref 0–18)
BASOPHILS # BLD AUTO: 0.04 X10(3) UL (ref 0–0.2)
BASOPHILS NFR BLD AUTO: 0.5 %
BUN BLD-MCNC: 12 MG/DL (ref 7–18)
CALCIUM BLD-MCNC: 9.1 MG/DL (ref 8.5–10.1)
CHLORIDE SERPL-SCNC: 102 MMOL/L (ref 98–112)
CO2 SERPL-SCNC: 31 MMOL/L (ref 21–32)
CREAT BLD-MCNC: 0.59 MG/DL
CREAT BLD-MCNC: 0.59 MG/DL
EOSINOPHIL # BLD AUTO: 0.22 X10(3) UL (ref 0–0.7)
EOSINOPHIL NFR BLD AUTO: 2.8 %
ERYTHROCYTE [DISTWIDTH] IN BLOOD BY AUTOMATED COUNT: 14.8 %
GFR SERPLBLD BASED ON 1.73 SQ M-ARVRAT: 101 ML/MIN/1.73M2 (ref 60–?)
GFR SERPLBLD BASED ON 1.73 SQ M-ARVRAT: 101 ML/MIN/1.73M2 (ref 60–?)
GLUCOSE BLD-MCNC: 97 MG/DL (ref 70–99)
HCT VFR BLD AUTO: 29.4 %
HCT VFR BLD AUTO: 33.2 %
HGB BLD-MCNC: 10.2 G/DL
HGB BLD-MCNC: 9.2 G/DL
IMM GRANULOCYTES # BLD AUTO: 0.02 X10(3) UL (ref 0–1)
IMM GRANULOCYTES NFR BLD: 0.3 %
LYMPHOCYTES # BLD AUTO: 1.69 X10(3) UL (ref 1–4)
LYMPHOCYTES NFR BLD AUTO: 21.5 %
MCH RBC QN AUTO: 29 PG (ref 26–34)
MCHC RBC AUTO-ENTMCNC: 31.3 G/DL (ref 31–37)
MCV RBC AUTO: 92.7 FL
MONOCYTES # BLD AUTO: 0.94 X10(3) UL (ref 0.1–1)
MONOCYTES NFR BLD AUTO: 12 %
NEUTROPHILS # BLD AUTO: 4.95 X10 (3) UL (ref 1.5–7.7)
NEUTROPHILS # BLD AUTO: 4.95 X10(3) UL (ref 1.5–7.7)
NEUTROPHILS NFR BLD AUTO: 62.9 %
OSMOLALITY SERPL CALC.SUM OF ELEC: 280 MOSM/KG (ref 275–295)
PLATELET # BLD AUTO: 335 10(3)UL (ref 150–450)
POTASSIUM SERPL-SCNC: 4.5 MMOL/L (ref 3.5–5.1)
RBC # BLD AUTO: 3.17 X10(6)UL
SODIUM SERPL-SCNC: 135 MMOL/L (ref 136–145)
WBC # BLD AUTO: 7.9 X10(3) UL (ref 4–11)

## 2023-06-24 PROCEDURE — 82565 ASSAY OF CREATININE: CPT | Performed by: PLASTIC SURGERY

## 2023-06-24 PROCEDURE — 85025 COMPLETE CBC W/AUTO DIFF WBC: CPT | Performed by: STUDENT IN AN ORGANIZED HEALTH CARE EDUCATION/TRAINING PROGRAM

## 2023-06-24 PROCEDURE — 80048 BASIC METABOLIC PNL TOTAL CA: CPT | Performed by: STUDENT IN AN ORGANIZED HEALTH CARE EDUCATION/TRAINING PROGRAM

## 2023-06-24 NOTE — PLAN OF CARE
Pt A&O. On room air. Encouraged use of incentive spirometer and ankle pump exercises every hour while awake. Scds to RLE. Tolerating diet with good appetite. Last BM 6/21/23. Miralax given this AM. Voiding w/o difficulty. Pain managed with oral medications. Pt able to get to chair today maintaining NWB to LLE with max assist x2 using walker and gait belt. KI on at all times. Surgical drsg and graft site drsg both intact. To be changed by Plastics. Drain w/ small amount drainage noted. Pt reminded to \"call; don't fall\". Tolerating atbx as ordered by ID. Plan is to be dc'd to [facility] [place] when cleared. Will need insurance auth for VALENTE.

## 2023-06-24 NOTE — PLAN OF CARE
A/O VSS on room air. Left leg pain managed with norco. Patient states chronic neuropathy to BLE and BUE. Left leg with ace wrap and immobilizer. Elevated on pillow. Left thigh graft site with tegaderm drainage noted. Patient on day 3/5 bedrest. Plan of care reviewed. Call light within reach.

## 2023-06-25 LAB
ANION GAP SERPL CALC-SCNC: 7 MMOL/L (ref 0–18)
BASOPHILS # BLD AUTO: 0.05 X10(3) UL (ref 0–0.2)
BASOPHILS NFR BLD AUTO: 0.7 %
BUN BLD-MCNC: 13 MG/DL (ref 7–18)
CALCIUM BLD-MCNC: 9.1 MG/DL (ref 8.5–10.1)
CHLORIDE SERPL-SCNC: 99 MMOL/L (ref 98–112)
CO2 SERPL-SCNC: 28 MMOL/L (ref 21–32)
CREAT BLD-MCNC: 0.46 MG/DL
CREAT BLD-MCNC: 0.46 MG/DL
EOSINOPHIL # BLD AUTO: 0.21 X10(3) UL (ref 0–0.7)
EOSINOPHIL NFR BLD AUTO: 3 %
ERYTHROCYTE [DISTWIDTH] IN BLOOD BY AUTOMATED COUNT: 14.8 %
GFR SERPLBLD BASED ON 1.73 SQ M-ARVRAT: 107 ML/MIN/1.73M2 (ref 60–?)
GFR SERPLBLD BASED ON 1.73 SQ M-ARVRAT: 107 ML/MIN/1.73M2 (ref 60–?)
GLUCOSE BLD-MCNC: 90 MG/DL (ref 70–99)
HCT VFR BLD AUTO: 30.3 %
HGB BLD-MCNC: 9.4 G/DL
IMM GRANULOCYTES # BLD AUTO: 0.02 X10(3) UL (ref 0–1)
IMM GRANULOCYTES NFR BLD: 0.3 %
LYMPHOCYTES # BLD AUTO: 1.37 X10(3) UL (ref 1–4)
LYMPHOCYTES NFR BLD AUTO: 19.4 %
MCH RBC QN AUTO: 28.7 PG (ref 26–34)
MCHC RBC AUTO-ENTMCNC: 31 G/DL (ref 31–37)
MCV RBC AUTO: 92.4 FL
MONOCYTES # BLD AUTO: 0.88 X10(3) UL (ref 0.1–1)
MONOCYTES NFR BLD AUTO: 12.4 %
NEUTROPHILS # BLD AUTO: 4.55 X10 (3) UL (ref 1.5–7.7)
NEUTROPHILS # BLD AUTO: 4.55 X10(3) UL (ref 1.5–7.7)
NEUTROPHILS NFR BLD AUTO: 64.2 %
OSMOLALITY SERPL CALC.SUM OF ELEC: 278 MOSM/KG (ref 275–295)
PLATELET # BLD AUTO: 339 10(3)UL (ref 150–450)
POTASSIUM SERPL-SCNC: 4.6 MMOL/L (ref 3.5–5.1)
RBC # BLD AUTO: 3.28 X10(6)UL
SODIUM SERPL-SCNC: 134 MMOL/L (ref 136–145)
WBC # BLD AUTO: 7.1 X10(3) UL (ref 4–11)

## 2023-06-25 PROCEDURE — 82565 ASSAY OF CREATININE: CPT | Performed by: PLASTIC SURGERY

## 2023-06-25 PROCEDURE — 85025 COMPLETE CBC W/AUTO DIFF WBC: CPT | Performed by: STUDENT IN AN ORGANIZED HEALTH CARE EDUCATION/TRAINING PROGRAM

## 2023-06-25 PROCEDURE — 80048 BASIC METABOLIC PNL TOTAL CA: CPT | Performed by: STUDENT IN AN ORGANIZED HEALTH CARE EDUCATION/TRAINING PROGRAM

## 2023-06-25 NOTE — PLAN OF CARE
A/O VSS on room air. Encouraged use of incentive spirometer. Left leg pain managed with norco. Patient states chronic neuropathy to BLE and BUE. Left leg with ace wrap and immobilizer. Elevated on pillow. Left thigh graft site with tegaderm drainage noted. Patient on day 3/5 bedrest. Plan of care reviewed. Call light within reach.

## 2023-06-25 NOTE — PLAN OF CARE
Pt A&O. On room air. Encouraged use of incentive spirometer and ankle pump exercises every hour while awake. Scds to RLE. Tolerating diet with good appetite. Last BM 6/21/23. Miralax given this AM. Voiding w/o difficulty. Pain managed with oral medications. Pt states she was happy to get to the chair yesterday but would like to wait until tomorrow to advance activity. NWB to LLE. KI on loosely tokeep pressure off of knee joint at all times. Surgical drsg and graft site drsg both intact. To be changed by Plastics. Drain w/ scant amount drainage noted. Pt reminded to \"call; don't fall\". Tolerating atbx as ordered by ID. Plan is to be dc'd to [facility] [place] when cleared. Will need insurance auth for VALENTE.

## 2023-06-26 LAB
ANION GAP SERPL CALC-SCNC: 2 MMOL/L (ref 0–18)
BASOPHILS # BLD AUTO: 0.04 X10(3) UL (ref 0–0.2)
BASOPHILS NFR BLD AUTO: 0.6 %
BUN BLD-MCNC: 9 MG/DL (ref 7–18)
CALCIUM BLD-MCNC: 9.1 MG/DL (ref 8.5–10.1)
CHLORIDE SERPL-SCNC: 101 MMOL/L (ref 98–112)
CO2 SERPL-SCNC: 30 MMOL/L (ref 21–32)
CREAT BLD-MCNC: 0.54 MG/DL
CREAT BLD-MCNC: 0.54 MG/DL
EOSINOPHIL # BLD AUTO: 0.17 X10(3) UL (ref 0–0.7)
EOSINOPHIL NFR BLD AUTO: 2.5 %
ERYTHROCYTE [DISTWIDTH] IN BLOOD BY AUTOMATED COUNT: 14.6 %
GFR SERPLBLD BASED ON 1.73 SQ M-ARVRAT: 103 ML/MIN/1.73M2 (ref 60–?)
GFR SERPLBLD BASED ON 1.73 SQ M-ARVRAT: 103 ML/MIN/1.73M2 (ref 60–?)
GLUCOSE BLD-MCNC: 101 MG/DL (ref 70–99)
HCT VFR BLD AUTO: 31.8 %
HGB BLD-MCNC: 10 G/DL
IMM GRANULOCYTES # BLD AUTO: 0.02 X10(3) UL (ref 0–1)
IMM GRANULOCYTES NFR BLD: 0.3 %
LYMPHOCYTES # BLD AUTO: 1.41 X10(3) UL (ref 1–4)
LYMPHOCYTES NFR BLD AUTO: 20.5 %
MCH RBC QN AUTO: 28.2 PG (ref 26–34)
MCHC RBC AUTO-ENTMCNC: 31.4 G/DL (ref 31–37)
MCV RBC AUTO: 89.8 FL
MONOCYTES # BLD AUTO: 0.9 X10(3) UL (ref 0.1–1)
MONOCYTES NFR BLD AUTO: 13.1 %
NEUTROPHILS # BLD AUTO: 4.33 X10 (3) UL (ref 1.5–7.7)
NEUTROPHILS # BLD AUTO: 4.33 X10(3) UL (ref 1.5–7.7)
NEUTROPHILS NFR BLD AUTO: 63 %
OSMOLALITY SERPL CALC.SUM OF ELEC: 275 MOSM/KG (ref 275–295)
PLATELET # BLD AUTO: 382 10(3)UL (ref 150–450)
POTASSIUM SERPL-SCNC: 4.3 MMOL/L (ref 3.5–5.1)
RBC # BLD AUTO: 3.54 X10(6)UL
SODIUM SERPL-SCNC: 133 MMOL/L (ref 136–145)
WBC # BLD AUTO: 6.9 X10(3) UL (ref 4–11)

## 2023-06-26 PROCEDURE — 82565 ASSAY OF CREATININE: CPT | Performed by: PLASTIC SURGERY

## 2023-06-26 PROCEDURE — 80048 BASIC METABOLIC PNL TOTAL CA: CPT | Performed by: STUDENT IN AN ORGANIZED HEALTH CARE EDUCATION/TRAINING PROGRAM

## 2023-06-26 PROCEDURE — 85025 COMPLETE CBC W/AUTO DIFF WBC: CPT | Performed by: STUDENT IN AN ORGANIZED HEALTH CARE EDUCATION/TRAINING PROGRAM

## 2023-06-26 NOTE — PLAN OF CARE
A/O VSS on room air. Encouraged use of incentive spirometer. Left leg pain managed with norco. Patient states chronic neuropathy to BLE and BUE. Left leg with ace wrap and immobilizer. Elevated on pillow. Left thigh graft site with tegaderm drainage noted. Patient on day 5/5 bedrest. Plan of care reviewed. Call light within reach.

## 2023-06-26 NOTE — CM/SW NOTE
Updates sent to [facility] via [place]. Plastics note with instructions for daily dressing changes included. Pt only able to dangle leg for limited time for next 2 days. Toe touch weight bearing. Spoke with pt's RN who will clarify when pt is able to work with PT for re-eval and when pt may be medically ready for DC. Insurance Roslyn Rea will be needed for VALENTE. / to remain available for support and/or discharge planning.      [facility] Meek [facility]  [facility] Planner  [phone]

## 2023-06-26 NOTE — PLAN OF CARE
LLE dressing change done by Dr. Carlos Alberto La this am, then daily dressing changes as per order. Started LLE dangling trial.  If graft tolerates dangling schedule, can have PT eval on thurs with TTWB and brace. LLE elevated on pillow with brace at all times. NATALIA intact with bulb suction. Taking Norco or Tylenol prn for pain control. VALENTE on dc. Pt verbalized understanding of POC.

## 2023-06-26 NOTE — PROGRESS NOTES
No major complaints. Alert and oriented  Right knee dressing intact. Leg compartments are soft. MS intact. S/p muscle flap with skin grafting due to post op wound left TKR:  Activity and dressing changes per plastics  Abx per ID  Fu in office once ok to resume PT. Anticipate immobilization for few weeks. She understands that there may be a degree of stiffness. Will sign off for now.     Nasima Shook MD

## 2023-06-27 LAB
CREAT BLD-MCNC: 0.54 MG/DL
GFR SERPLBLD BASED ON 1.73 SQ M-ARVRAT: 103 ML/MIN/1.73M2 (ref 60–?)

## 2023-06-27 PROCEDURE — 82565 ASSAY OF CREATININE: CPT | Performed by: PLASTIC SURGERY

## 2023-06-27 NOTE — PLAN OF CARE
Alert and oriented x 4. Vitals stable on room air. Pain managed on PO medications. Denies numbness/tingling. Dressing to the knee changed. NATALIA drain in place intact and draining. Voiding without difficulty. Last BM 06/25. Tolerating regular diet. SCD refused, ankle pumps encouraged. Dangling to the BLE to be done for 20 min 2 times today. Safety precautions in place. . Plan of care discussed with patient.

## 2023-06-27 NOTE — PHYSICAL THERAPY NOTE
New PT order placed but dated for 6/29/23 as a start date. Will f/u for skilled PT eval on Thursday as ordered.

## 2023-06-27 NOTE — CM/SW NOTE
Met with pt to discuss DC planning. Pt confirmed that she prefers to go to [facility] for Männi 12 at \[facility]\"". Insurance Haseeb [employer] will be needed and anticipate that pt will appeal if insurance denies VALENTE. Reviewed plan for PT eval on Thursday to determine if recommendations change to VALENTE given pt now TTWB with immobilizer. All questions/concerns addressed. Updates sent to [facility] via JumpIn. Spoke with Ginette Islas from [facility] [place] regarding DC plan and need to request auth Thursday once therapy re-evals are available. She confirmed facility able to provide wound care as detailed in plastics note from yesterday. / to remain available for support and/or discharge planning.      [facility]  W:[phone]   [facility]  Discharge Planner  [phone]

## 2023-06-27 NOTE — PLAN OF CARE
A&Ox4 vital signs stable on RA. POD#6. Tolerating diet, IS encouraged, voiding via purewick. Moderate c/o pain managed with PRN medication. Knee immobilizer in place. NATALIA drain intact. NWB to LLE. Incision sites D/I, old drainage noted. Dangling trail completed. IV abx administered per MAR. POC discussed, pt verbalized understanding. No further questions at this time. Call light within reach.

## 2023-06-28 LAB
CREAT BLD-MCNC: 0.55 MG/DL
GFR SERPLBLD BASED ON 1.73 SQ M-ARVRAT: 102 ML/MIN/1.73M2 (ref 60–?)

## 2023-06-28 PROCEDURE — 82565 ASSAY OF CREATININE: CPT | Performed by: PLASTIC SURGERY

## 2023-06-28 NOTE — PLAN OF CARE
Alert and oriented x 4. Vitals stable on room air. Pain managed on PO medications. Dressing intact to left leg (knee and thigh)  Purewick in place. Last BM 6/27. Tolerating regular diet. SCD on right leg. Immobilizer on left leg/knee while in bed. Patient dangled on edge of bed for 20 minutes. Tolerated well. Safety precautions in place. Plan of care discussed with patient.

## 2023-06-28 NOTE — PLAN OF CARE
Pt A&Ox4, VSS on RA, . Pt reports pain to LLE; PO pain medications given with adequate relief. Dressing and immobilizer to LLE intact. Daily dressing changes as ordered. Pt tolerating diet, voiding freely in purewick and bedside commode, LBM 6/28. Pt able to dangle at bedside for 30 minutes. Plan for full PT/OT eval and to DC pending PT recommendations. Patient to DC on PO abx. Plan of care reviewed with patient. Patient demonstrates understanding.

## 2023-06-28 NOTE — CM/SW NOTE
Care Progression Note:  Length of stay: 16  GMLOS:   Avoidable Delays:   Code Status: Full    Acute Medical Issue/Factors:   Infection of deep incisional surgical site after procedure, initial encounter   POD 7 left leg medial gastrocnemius muscle flap w/split thickness skin graft     Discharge Barriers: post-operative therapy evaluations, medical clearance  Expected discharge date: TBD, 1-3 days   Expected next site of care: TBD. Patient requesting discharge to [facility]E facility     PT evaluation ordered by Plastic Surgeon for Thursday 6/29; limited activity ordered post-operatively (5 days bedrest) per chart review. VALENTE referral initiated in [facility] system by MARICARMEN.  CM/SW to follow up for therapy recommendation and further discharge planning. Insurance authorization will be needed for post acute care.         Huy Lopez RN Case Manager [id number]

## 2023-06-29 LAB
CREAT BLD-MCNC: 0.56 MG/DL
GFR SERPLBLD BASED ON 1.73 SQ M-ARVRAT: 102 ML/MIN/1.73M2 (ref 60–?)

## 2023-06-29 PROCEDURE — 97168 OT RE-EVAL EST PLAN CARE: CPT

## 2023-06-29 PROCEDURE — 97162 PT EVAL MOD COMPLEX 30 MIN: CPT

## 2023-06-29 PROCEDURE — 82565 ASSAY OF CREATININE: CPT | Performed by: PLASTIC SURGERY

## 2023-06-29 PROCEDURE — 97530 THERAPEUTIC ACTIVITIES: CPT

## 2023-06-29 NOTE — PHYSICAL THERAPY NOTE
PHYSICAL THERAPY EVALUATION - INPATIENT     Room Number: 384/384-A  Evaluation Date: 6/29/2023  Type of Evaluation: Re-evaluation  Physician Order: PT Eval and Treat    Presenting Problem: s/p muscle flap with plastic  Co-Morbidities : back pain, high cholesterol  Reason for Therapy: Mobility Dysfunction and Discharge Planning    History related to current admission: Patient is a 58year old female admitted on 6/12/2023 from home for infection of deep incisional surgical site after procedure. Pt underwent L TKR on 4/10/23. Initially DC from hospital but returned 4/11/23 due to FTT. Complicated post op course as she developed necrotic wound along incision site. Debridement on 5/22/23 to wound site. Pt continued to experience delayed wound healing and additional I and D performed on 6/13/2023. Patient underwent muscle flap with plastic surgery on 6/21/2023. ASSESSMENT   In this PT evaluation, the patient presents with the following impairments decreased strength, decreased functional activities tolerated, TTWB LLE. These impairments and comorbidities manifest themselves as functional limitations in independent bed mobility, transfers, and gait. The patient is below baseline and would benefit from skilled inpatient PT to address the above deficits to assist patient in returning to prior to level of function. Functional outcome measures completed include [facility]. The AM-PAC '6-Clicks' Inpatient Basic Mobility Short Form was completed and this patient is demonstrating a Approx Degree of Impairment: 61.29%  degree of impairment in mobility. Patient unable to maintain TTWB status on LLE and may not be appropriate for acute rehab. However patient may benefit from acute rehabilitation instead of subacute rehab once plastics lifts mobility restrictions due to motivation and prior level of function.      DISCHARGE RECOMMENDATIONS  PT Discharge Recommendations: Sub-acute rehabilitation    PLAN  PT Treatment Plan: Bed mobility; Body mechanics; Endurance; Patient education;Strengthening;Transfer training  Rehab Potential : Fair  Frequency (Obs): 3-5x/week  Number of Visits to Meet Established Goals: 3      CURRENT GOALS    Goal #1 Patient is able to demonstrate supine - sit EOB @ level: supervision     Goal #2 Patient is able to demonstrate transfers Sit to/from Stand at assistance level: minimum assistance     Goal #3 Patient is able to ambulate 10 feet with assist device: walker - rolling at assistance level: minimum assistance     Goal #4    Goal #5    Goal #6    Goal Comments: Goals established on 2023    HOME SITUATION  Type of Home: House   Home Layout: Multi-level  Stairs to Enter : 0  Railing: No  Stairs to Bedroom: 7  Railing: Yes    Lives With: Spouse;Caregiver part-time  Drives: No  Patient Owned Equipment: Rolling walker;Cane  Patient Regularly Uses: Glasses    Prior Level of [place]: PTA, pt was mod I with mobility and transfers. Pt has been working towards TKA recovery and had almost progressed from RW to straight cane before procedure. Patient has been at hospital since 2023. SUBJECTIVE  \"I feel like my legs have gotten weaker\"       OBJECTIVE  Precautions: Knee immobilizer;Bed/chair alarm  Fall Risk: High fall risk    WEIGHT BEARING RESTRICTION  Weight Bearing Restriction: L lower extremity           L Lower Extremity: Toe Touch Weight Bearing    PAIN ASSESSMENT  Ratin  Location: L knee  Management Techniques:  Activity promotion;Relaxation;Repositioning    COGNITION  Overall Cognitive Status:  WFL - within functional limits    RANGE OF MOTION AND STRENGTH ASSESSMENT    Lower extremity ROM is within functional limits     Lower extremity strength not formally tested       BALANCE  Static Sitting: Good  Dynamic Sitting: Good  Static Standing: Poor +  Dynamic Standing: Poor +    ADDITIONAL TESTS                                    ACTIVITY TOLERANCE                         O2 WALK NEUROLOGICAL FINDINGS                        AM-PAC '6-Clicks' INPATIENT SHORT FORM - BASIC MOBILITY  How much difficulty does the patient currently have. .. Patient Difficulty: Turning over in bed (including adjusting bedclothes, sheets and blankets)?: A Little   Patient Difficulty: Sitting down on and standing up from a chair with arms (e.g., wheelchair, bedside commode, etc.): A Little   Patient Difficulty: Moving from lying on back to sitting on the side of the bed?: A Little   How much help from another person does the patient currently need. .. Help from Another: Moving to and from a bed to a chair (including a wheelchair)?: A Lot   Help from Another: Need to walk in hospital room?: A Lot   Help from Another: Climbing 3-5 steps with a railing?: Total       AM-PAC Score:  Raw Score: 14   Approx Degree of Impairment: 61.29%   Standardized Score (AM-PAC Scale): 38.1   CMS Modifier (G-Code): CL    FUNCTIONAL ABILITY STATUS  Gait Assessment   Functional Mobility/Gait Assessment  Gait Assistance: Moderate assistance  Distance (ft): 2  Assistive Device: Rolling walker  Pattern: Shuffle (LLE TTWB)    Skilled Therapy Provided     Bed Mobility:  Rolling: supervision   Supine to sit: supervision    Sit to supine: supervision      Transfer Mobility:  Sit to stand: Mod A with RW; Patient unable to maintain TTWB status    Stand to sit: supervision   Gait = mod A with RW; patient unable to maintain TTWB during stand pivot transfer    Therapist's Comments: Patient supine in bed and agreeable to therapy. Per nurse, plastics continuing to allow 30 min LLE dangles x2 and LLE TTWB. Patient education on seated LE exercises to perform on R LE as she dangles L LE. Patient  reminded to call nursing staff for mobility or other personal requests; patient agreeable.      Exercise/Education Provided:  Bed mobility  Body mechanics  Functional activity tolerated  Strengthening  Transfer training    Patient End of Session: Up in chair;Call light within reach;RN aware of session/findings; Alarm set; All patient questions and concerns addressed      Patient Evaluation Complexity Level:  History Moderate - 1 or 2 personal factors and/or co-morbidities   Examination of body systems Moderate - addressing a total of 3 or more elements   Clinical Presentation Moderate - Evolving   Clinical Decision Making Moderate - Evolving       PT Session Time: 30 minutes  Therapeutic Activity: 15 minutes

## 2023-06-29 NOTE — CM/SW NOTE
Awaiting therapy evals to initiate insurance auth for VALENTE. Message sent to PT/OT with request to see pt as soon as possible. Will send updates to [facility] [place] [address] [employer] via [employer] with request to initiate auth once completed. Pt desires to discharge to [facility] for VALENTE once auth received.     BERTA MottN, RN-BC    [id number]

## 2023-06-29 NOTE — PLAN OF CARE
A/O VSS on room air. Pain to left knee managed with norco. Dressing and immobilizer to LLE. Daily dressing changes as ordered. Patient able to dangle at bedside for 30 minutes. PT/OT to see patient today. TTWB. Patient to discharge on PO antibiotics. Plan of care reviewed. Call light within reach.

## 2023-06-29 NOTE — CM/SW NOTE
Therapy notes added to [facility] for [facility]- they will work on [facility].     EVON HarringtonW  /Discharge Planner

## 2023-06-30 VITALS
HEIGHT: 61 IN | RESPIRATION RATE: 18 BRPM | HEART RATE: 61 BPM | TEMPERATURE: 98 F | OXYGEN SATURATION: 95 % | SYSTOLIC BLOOD PRESSURE: 135 MMHG | DIASTOLIC BLOOD PRESSURE: 76 MMHG | WEIGHT: 160.06 LBS | BODY MASS INDEX: 30.22 KG/M2

## 2023-06-30 LAB
CREAT BLD-MCNC: 0.52 MG/DL
GFR SERPLBLD BASED ON 1.73 SQ M-ARVRAT: 104 ML/MIN/1.73M2 (ref 60–?)

## 2023-06-30 PROCEDURE — 82565 ASSAY OF CREATININE: CPT | Performed by: PLASTIC SURGERY

## 2023-06-30 RX ORDER — CEPHALEXIN 500 MG/1
500 CAPSULE ORAL 4 TIMES DAILY
Qty: 20 CAPSULE | Refills: 0 | Status: SHIPPED | OUTPATIENT
Start: 2023-06-30 | End: 2023-07-11

## 2023-06-30 RX ORDER — POLYETHYLENE GLYCOL 3350 17 G/17G
17 POWDER, FOR SOLUTION ORAL DAILY PRN
Refills: 0 | Status: SHIPPED | COMMUNITY
Start: 2023-06-30

## 2023-06-30 RX ORDER — METRONIDAZOLE 500 MG/1
500 TABLET ORAL EVERY 8 HOURS SCHEDULED
Qty: 15 TABLET | Refills: 0 | Status: SHIPPED | OUTPATIENT
Start: 2023-06-30 | End: 2023-07-11

## 2023-06-30 RX ORDER — HYDROCODONE BITARTRATE AND ACETAMINOPHEN 5; 325 MG/1; MG/1
1 TABLET ORAL EVERY 4 HOURS PRN
Qty: 30 TABLET | Refills: 0 | Status: ON HOLD | OUTPATIENT
Start: 2023-06-30 | End: 2023-07-11

## 2023-06-30 RX ORDER — FAMOTIDINE 20 MG/1
20 TABLET, FILM COATED ORAL 2 TIMES DAILY
Refills: 0 | Status: SHIPPED | COMMUNITY
Start: 2023-06-30

## 2023-06-30 NOTE — PLAN OF CARE
Patient awaiting approval at [facility]. Dressing dry and intact. Vitals stable, pain controlled.

## 2023-06-30 NOTE — CM/SW NOTE
06/30/23 1639   Discharge disposition   Expected discharge disposition Skilled Nurs   [facility] Provider [facility] [place] [facility]   Discharge transportation [facility]       Pt cleared to [facility] to [facility] today. RN to call report to [phone]. [facility] arranged for 6pm christie.     Jonathon Murphy LCSW  /Discharge Planner

## 2023-06-30 NOTE — PLAN OF CARE
Patient A&O X4 on RA- 2L O2 PRN. VSS, /IS. Refusing SCDs, subQ Heparin. Voiding freely via [address], [facility] 6/29. On PO Flagyl and IV Ancef Q8h. Surgical incision to left knee/lower leg covered with ABD/ace wrap, c/d/i. Graft site to left upper thigh covered with tegaderm, c/d/i. Hx neuropathy to BLE and BUE. Pain controlled with PO medication. TTWB. Orders to dangle left leg for 30 minutes BID. PT/OT. Up stand-pivot w/ walker and KI. Reminded to use call light. Plan to dc to [facility] pending insurance auth.

## 2023-06-30 NOTE — PROGRESS NOTES
Patient discharged to [facility] via ambulance. Vitals stable. Pain controlled. Dressing dry and intact. Report called in to [facility].

## 2023-07-06 ENCOUNTER — HOSPITAL ENCOUNTER (INPATIENT)
Facility: HOSPITAL | Age: 63
LOS: 5 days | Discharge: NURSING, REHAB OR LONG-TERM CARE | End: 2023-07-11
Attending: EMERGENCY MEDICINE | Admitting: STUDENT IN AN ORGANIZED HEALTH CARE EDUCATION/TRAINING PROGRAM
Payer: COMMERCIAL

## 2023-07-06 ENCOUNTER — APPOINTMENT (OUTPATIENT)
Dept: GENERAL RADIOLOGY | Facility: HOSPITAL | Age: 63
End: 2023-07-06
Attending: EMERGENCY MEDICINE
Payer: COMMERCIAL

## 2023-07-06 ENCOUNTER — APPOINTMENT (OUTPATIENT)
Dept: ULTRASOUND IMAGING | Facility: HOSPITAL | Age: 63
End: 2023-07-06
Attending: EMERGENCY MEDICINE
Payer: COMMERCIAL

## 2023-07-06 DIAGNOSIS — L03.116 CELLULITIS OF LEFT LOWER EXTREMITY: Primary | ICD-10-CM

## 2023-07-06 LAB
ANION GAP SERPL CALC-SCNC: 7 MMOL/L (ref 0–18)
BASOPHILS # BLD AUTO: 0.06 X10(3) UL (ref 0–0.2)
BASOPHILS NFR BLD AUTO: 0.7 %
BUN BLD-MCNC: 14 MG/DL (ref 7–18)
CALCIUM BLD-MCNC: 8.8 MG/DL (ref 8.5–10.1)
CHLORIDE SERPL-SCNC: 96 MMOL/L (ref 98–112)
CO2 SERPL-SCNC: 28 MMOL/L (ref 21–32)
CREAT BLD-MCNC: 0.56 MG/DL
EOSINOPHIL # BLD AUTO: 0.24 X10(3) UL (ref 0–0.7)
EOSINOPHIL NFR BLD AUTO: 2.8 %
ERYTHROCYTE [DISTWIDTH] IN BLOOD BY AUTOMATED COUNT: 14.6 %
GFR SERPLBLD BASED ON 1.73 SQ M-ARVRAT: 102 ML/MIN/1.73M2 (ref 60–?)
GLUCOSE BLD-MCNC: 179 MG/DL (ref 70–99)
HCT VFR BLD AUTO: 28.4 %
HGB BLD-MCNC: 9.3 G/DL
IMM GRANULOCYTES # BLD AUTO: 0.03 X10(3) UL (ref 0–1)
IMM GRANULOCYTES NFR BLD: 0.4 %
LYMPHOCYTES # BLD AUTO: 1.19 X10(3) UL (ref 1–4)
LYMPHOCYTES NFR BLD AUTO: 13.9 %
MCH RBC QN AUTO: 28.6 PG (ref 26–34)
MCHC RBC AUTO-ENTMCNC: 32.7 G/DL (ref 31–37)
MCV RBC AUTO: 87.4 FL
MONOCYTES # BLD AUTO: 0.89 X10(3) UL (ref 0.1–1)
MONOCYTES NFR BLD AUTO: 10.4 %
NEUTROPHILS # BLD AUTO: 6.16 X10 (3) UL (ref 1.5–7.7)
NEUTROPHILS # BLD AUTO: 6.16 X10(3) UL (ref 1.5–7.7)
NEUTROPHILS NFR BLD AUTO: 71.8 %
OSMOLALITY SERPL CALC.SUM OF ELEC: 277 MOSM/KG (ref 275–295)
PLATELET # BLD AUTO: 340 10(3)UL (ref 150–450)
POTASSIUM SERPL-SCNC: 3.3 MMOL/L (ref 3.5–5.1)
RBC # BLD AUTO: 3.25 X10(6)UL
SODIUM SERPL-SCNC: 131 MMOL/L (ref 136–145)
WBC # BLD AUTO: 8.6 X10(3) UL (ref 4–11)

## 2023-07-06 PROCEDURE — 73560 X-RAY EXAM OF KNEE 1 OR 2: CPT | Performed by: EMERGENCY MEDICINE

## 2023-07-06 PROCEDURE — 73562 X-RAY EXAM OF KNEE 3: CPT | Performed by: EMERGENCY MEDICINE

## 2023-07-06 PROCEDURE — 85025 COMPLETE CBC W/AUTO DIFF WBC: CPT | Performed by: EMERGENCY MEDICINE

## 2023-07-06 PROCEDURE — 93971 EXTREMITY STUDY: CPT | Performed by: EMERGENCY MEDICINE

## 2023-07-06 PROCEDURE — 80048 BASIC METABOLIC PNL TOTAL CA: CPT | Performed by: EMERGENCY MEDICINE

## 2023-07-06 PROCEDURE — 87040 BLOOD CULTURE FOR BACTERIA: CPT | Performed by: EMERGENCY MEDICINE

## 2023-07-06 RX ORDER — METRONIDAZOLE 500 MG/1
500 TABLET ORAL EVERY 8 HOURS SCHEDULED
Status: DISCONTINUED | OUTPATIENT
Start: 2023-07-07 | End: 2023-07-11

## 2023-07-06 RX ORDER — POLYETHYLENE GLYCOL 3350 17 G/17G
17 POWDER, FOR SOLUTION ORAL DAILY PRN
Status: DISCONTINUED | OUTPATIENT
Start: 2023-07-06 | End: 2023-07-11

## 2023-07-06 RX ORDER — HYDROXYCHLOROQUINE SULFATE 200 MG/1
200 TABLET, FILM COATED ORAL 2 TIMES DAILY
Status: DISCONTINUED | OUTPATIENT
Start: 2023-07-06 | End: 2023-07-11

## 2023-07-06 RX ORDER — BISACODYL 10 MG
10 SUPPOSITORY, RECTAL RECTAL
Status: DISCONTINUED | OUTPATIENT
Start: 2023-07-06 | End: 2023-07-11

## 2023-07-06 RX ORDER — CYCLOBENZAPRINE HCL 5 MG
5 TABLET ORAL 3 TIMES DAILY PRN
Status: DISCONTINUED | OUTPATIENT
Start: 2023-07-06 | End: 2023-07-11

## 2023-07-06 RX ORDER — GABAPENTIN 300 MG/1
300 CAPSULE ORAL 3 TIMES DAILY
Status: DISCONTINUED | OUTPATIENT
Start: 2023-07-06 | End: 2023-07-10

## 2023-07-06 RX ORDER — SENNOSIDES 8.6 MG
17.2 TABLET ORAL NIGHTLY PRN
Status: DISCONTINUED | OUTPATIENT
Start: 2023-07-06 | End: 2023-07-11

## 2023-07-06 RX ORDER — HYDROCODONE BITARTRATE AND ACETAMINOPHEN 5; 325 MG/1; MG/1
1 TABLET ORAL ONCE
Status: COMPLETED | OUTPATIENT
Start: 2023-07-06 | End: 2023-07-06

## 2023-07-06 RX ORDER — ENEMA 19; 7 G/133ML; G/133ML
1 ENEMA RECTAL ONCE AS NEEDED
Status: DISCONTINUED | OUTPATIENT
Start: 2023-07-06 | End: 2023-07-11

## 2023-07-06 RX ORDER — ACETAMINOPHEN 500 MG
500 TABLET ORAL EVERY 4 HOURS PRN
Status: DISCONTINUED | OUTPATIENT
Start: 2023-07-06 | End: 2023-07-11

## 2023-07-06 RX ORDER — SODIUM CHLORIDE 9 MG/ML
INJECTION, SOLUTION INTRAVENOUS CONTINUOUS
Status: ACTIVE | OUTPATIENT
Start: 2023-07-06 | End: 2023-07-07

## 2023-07-06 RX ORDER — ATORVASTATIN CALCIUM 20 MG/1
20 TABLET, FILM COATED ORAL NIGHTLY
Status: DISCONTINUED | OUTPATIENT
Start: 2023-07-06 | End: 2023-07-11

## 2023-07-06 RX ORDER — HYDROCODONE BITARTRATE AND ACETAMINOPHEN 5; 325 MG/1; MG/1
1 TABLET ORAL EVERY 4 HOURS PRN
Status: DISCONTINUED | OUTPATIENT
Start: 2023-07-06 | End: 2023-07-11

## 2023-07-06 RX ORDER — ONDANSETRON 2 MG/ML
4 INJECTION INTRAMUSCULAR; INTRAVENOUS EVERY 6 HOURS PRN
Status: DISCONTINUED | OUTPATIENT
Start: 2023-07-06 | End: 2023-07-11

## 2023-07-06 RX ORDER — MORPHINE SULFATE 4 MG/ML
4 INJECTION, SOLUTION INTRAMUSCULAR; INTRAVENOUS ONCE
Status: COMPLETED | OUTPATIENT
Start: 2023-07-06 | End: 2023-07-06

## 2023-07-06 RX ORDER — METRONIDAZOLE 500 MG/1
500 TABLET ORAL ONCE
Status: COMPLETED | OUTPATIENT
Start: 2023-07-06 | End: 2023-07-06

## 2023-07-06 RX ORDER — PROCHLORPERAZINE EDISYLATE 5 MG/ML
5 INJECTION INTRAMUSCULAR; INTRAVENOUS EVERY 8 HOURS PRN
Status: DISCONTINUED | OUTPATIENT
Start: 2023-07-06 | End: 2023-07-11

## 2023-07-06 RX ORDER — FAMOTIDINE 20 MG/1
20 TABLET, FILM COATED ORAL 2 TIMES DAILY
Status: DISCONTINUED | OUTPATIENT
Start: 2023-07-06 | End: 2023-07-11

## 2023-07-06 NOTE — ED INITIAL ASSESSMENT (HPI)
Patient arrives via EMS,  From [facility], reports left knee replacement on 4/10, developed a wound afterwards that eventually turned into an infection on 6/12. Pt state that the last few days, she noticed increased swelling to left knee. Surgeon sent patient in, concerns for a possible DVT or another infection to wound. Knee brace noted to left knee, wound drain noted.   Took a norco at 2:50pm

## 2023-07-06 NOTE — PROGRESS NOTES
ED Antibiotic Dose Adjustment by Pharmacy    Vancomycin 1500 mg IVPB x1 dose (~20 mg/kg mg/kg) has been ordered in the ED as empiric therapy for left knee cellulitis/possible post-surgical site infection. Pharmacy has adjusted the dose to vancomycin 1000 mg IVPB x1 dose (~12.5 mg/kg based on actual body weight of 72.6 kg) due to the risk of accumulation as the patient's actual body weight is greater than 140% of the patient's ideal body weight). Please re-consult pharmacy for further dosing if patient is to continue vancomycin after admission.     Thank you,  Greg Luna, PharmD, BCPS  07/06/23; 6:09 PM

## 2023-07-07 LAB
ANION GAP SERPL CALC-SCNC: 3 MMOL/L (ref 0–18)
BASOPHILS # BLD AUTO: 0.04 X10(3) UL (ref 0–0.2)
BASOPHILS NFR BLD AUTO: 0.6 %
BUN BLD-MCNC: 8 MG/DL (ref 7–18)
CALCIUM BLD-MCNC: 8.7 MG/DL (ref 8.5–10.1)
CHLORIDE SERPL-SCNC: 102 MMOL/L (ref 98–112)
CO2 SERPL-SCNC: 30 MMOL/L (ref 21–32)
CREAT BLD-MCNC: 0.44 MG/DL
EOSINOPHIL # BLD AUTO: 0.28 X10(3) UL (ref 0–0.7)
EOSINOPHIL NFR BLD AUTO: 4 %
ERYTHROCYTE [DISTWIDTH] IN BLOOD BY AUTOMATED COUNT: 15 %
GFR SERPLBLD BASED ON 1.73 SQ M-ARVRAT: 108 ML/MIN/1.73M2 (ref 60–?)
GLUCOSE BLD-MCNC: 107 MG/DL (ref 70–99)
HCT VFR BLD AUTO: 27 %
HGB BLD-MCNC: 8.6 G/DL
IMM GRANULOCYTES # BLD AUTO: 0.03 X10(3) UL (ref 0–1)
IMM GRANULOCYTES NFR BLD: 0.4 %
LYMPHOCYTES # BLD AUTO: 0.98 X10(3) UL (ref 1–4)
LYMPHOCYTES NFR BLD AUTO: 14 %
MCH RBC QN AUTO: 28.8 PG (ref 26–34)
MCHC RBC AUTO-ENTMCNC: 31.9 G/DL (ref 31–37)
MCV RBC AUTO: 90.3 FL
MONOCYTES # BLD AUTO: 0.99 X10(3) UL (ref 0.1–1)
MONOCYTES NFR BLD AUTO: 14.2 %
NEUTROPHILS # BLD AUTO: 4.66 X10 (3) UL (ref 1.5–7.7)
NEUTROPHILS # BLD AUTO: 4.66 X10(3) UL (ref 1.5–7.7)
NEUTROPHILS NFR BLD AUTO: 66.8 %
OSMOLALITY SERPL CALC.SUM OF ELEC: 279 MOSM/KG (ref 275–295)
PLATELET # BLD AUTO: 305 10(3)UL (ref 150–450)
POTASSIUM SERPL-SCNC: 3.7 MMOL/L (ref 3.5–5.1)
RBC # BLD AUTO: 2.99 X10(6)UL
SODIUM SERPL-SCNC: 135 MMOL/L (ref 136–145)
WBC # BLD AUTO: 7 X10(3) UL (ref 4–11)

## 2023-07-07 PROCEDURE — 87205 SMEAR GRAM STAIN: CPT | Performed by: INTERNAL MEDICINE

## 2023-07-07 PROCEDURE — 87070 CULTURE OTHR SPECIMN AEROBIC: CPT | Performed by: INTERNAL MEDICINE

## 2023-07-07 PROCEDURE — 85025 COMPLETE CBC W/AUTO DIFF WBC: CPT | Performed by: STUDENT IN AN ORGANIZED HEALTH CARE EDUCATION/TRAINING PROGRAM

## 2023-07-07 PROCEDURE — 87075 CULTR BACTERIA EXCEPT BLOOD: CPT | Performed by: INTERNAL MEDICINE

## 2023-07-07 PROCEDURE — 99214 OFFICE O/P EST MOD 30 MIN: CPT

## 2023-07-07 PROCEDURE — 36569 INSJ PICC 5 YR+ W/O IMAGING: CPT

## 2023-07-07 PROCEDURE — 80048 BASIC METABOLIC PNL TOTAL CA: CPT | Performed by: STUDENT IN AN ORGANIZED HEALTH CARE EDUCATION/TRAINING PROGRAM

## 2023-07-07 RX ORDER — LIDOCAINE HYDROCHLORIDE 10 MG/ML
5 INJECTION, SOLUTION EPIDURAL; INFILTRATION; INTRACAUDAL; PERINEURAL
Status: COMPLETED | OUTPATIENT
Start: 2023-07-07 | End: 2023-07-07

## 2023-07-07 RX ORDER — HEPARIN SODIUM 5000 [USP'U]/ML
5000 INJECTION, SOLUTION INTRAVENOUS; SUBCUTANEOUS EVERY 8 HOURS SCHEDULED
Status: DISCONTINUED | OUTPATIENT
Start: 2023-07-07 | End: 2023-07-11

## 2023-07-07 RX ORDER — CELECOXIB 200 MG/1
200 CAPSULE ORAL DAILY
Status: DISCONTINUED | OUTPATIENT
Start: 2023-07-07 | End: 2023-07-11

## 2023-07-07 RX ORDER — MINERAL OIL AND PETROLATUM 150; 830 MG/G; MG/G
OINTMENT OPHTHALMIC 3 TIMES DAILY
Status: DISCONTINUED | OUTPATIENT
Start: 2023-07-07 | End: 2023-07-11

## 2023-07-07 NOTE — PLAN OF CARE
Dressing to left knee clean, dry, intact. Messaged plastics requesting activity orders, hospitalist for celebrex & infectious disease of consult. Instructed on plan of care, call for all asst needed, discomfort felt. Verbalized understanding.

## 2023-07-07 NOTE — ED QUICK NOTES
Orders for admission, patient is aware of plan and ready to go upstairs. Any questions, please call ED RN Mahin Hidalgo at extension [phone].      Patient Covid vaccination status: Fully vaccinated     COVID Test Ordered in ED: None    COVID Suspicion at Admission: N/A    Running Infusions:  None    Mental Status/LOC at time of transport: aaox4    Other pertinent information:   CIWA score: N/A   NIH score:  N/A

## 2023-07-07 NOTE — PHYSICAL THERAPY NOTE
PT order received via mobility screening, chart reviewed. Awaiting further activity clarifications as noted previously TTWB with activity restrictions. Will f/u for PT at a later date.

## 2023-07-07 NOTE — CONSULTS
[facility]  Report of Inpatient Wound Care Consultation    Abhi López Patient Status:  Inpatient    1960 MRN [id number]   [facility] 3SW-A Attending Birdie Wilcox, [address] Day [address] 1 PCP Malik Dean MD     Reason for Consultation:  L knee flap    History of Present Illness:  Abhi López is a a(n) 58year old female. Patient with multiple comorbidities, with present on admission skin breakdown described below. SUBJECTIVE:  No complaints, feels ok      History:  Past Medical History:   Diagnosis Date    Back problem     Benign essential hypertension 2010    High cholesterol     History of COVID-19 2022    fever, congestion- several days, fatigue x 14 days. Sx's resolved. Not hospitalized. Neuropathy     Moid hands and feet    Rheumatoid arthritis (HCC)     Tobacco abuse 2010    Visual impairment     glasses     Past Surgical History:   Procedure Laterality Date          x2    OTHER SURGICAL HISTORY      cervical 4- 5 spine fusion    OTHER SURGICAL HISTORY Right 2022    right shoulder replacement    SPINE SURGERY PROCEDURE UNLISTED  10/26/2021    lumbar 4-5 TLIF    STABISMUS SURG,SUPER-[facility]BL[facility]  Age 1      reports that she quit smoking about 22 months ago. Her smoking use included cigarettes. She has a 10.00 pack-year smoking history. She has never used smokeless tobacco. She reports that she does not currently use alcohol. She reports that she does not use drugs.       Allergies:  @ALLERGY    Laboratory Data:    Recent Labs   Lab 23  1700 23  0501   WBC 8.6 7.0   HGB 9.3* 8.6*   HCT 28.4* 27.0*   .0 305.0   CREATSERUM 0.56 0.44*   BUN 14 8   * 107*   CA 8.8 8.7         ASSESSMENT:  Wound 23 Leg Anterior;Left;Upper (Active)   Date First Assessed/Time First Assessed: 23 7667   Location: Leg  Wound Location Orientation: Anterior;Left;Upper      Assessments 2023  9:42 AM   Wound Image L medial knee flap and STSG, 1 NATALIA drain on the posterior aspect of the lower leg, redness noted, no open wounds appreciated. Cleansed and applied Xeroform, gauze, ABD pad, kerlix to the flap and posterior knee incision, ace wrap to the foot and ankle, re-applied knee immobilizer. Recommend dressing every 48 hours, order in system for such. Additional Notes:  Infectious Disease following. Thank you for this consultation and for allowing me to participate in the care of your patient. Please page me at #[phone] if you have any questions about this consultation and plan of care. Time Spent 45 Minutes. Thank you,  Catracho Clinton.  Major Brown, PT, MPT  Wound Care Clinician  [address] [place] [address] Team    7/7/2023  2:53 PM

## 2023-07-07 NOTE — CM/SW NOTE
Pt readmitted from [place] [address] at [facility]. Awaiting therapy evals, however she will need long term IV abx at discharge. Updates/referral sent to [address] [place] [address]. Will need to confirm discharge plan with pt. In addition, pt will require insurance auth for return to [facility]. CM/SW to remain available to assist with discharge planning needs.     Ethan Silva, BERTAN, RN-BC    [id number]

## 2023-07-07 NOTE — OCCUPATIONAL THERAPY NOTE
Attempted to see pt for skilled OT services this date. Awaiting clarification of activity restrictions. Will re-attempt as schedule allows.  Charli Stevens, 07/07/23, 11:05 AM

## 2023-07-07 NOTE — PLAN OF CARE
NURSING ADMISSION NOTE      Patient admitted via Cart from ED  Oriented to room. Safety precautions initiated. Bed in low position. Call light in reach. A&Ox4. VSS on room air. Pain to knee, relieved with pain meds. Dressing to incision changed, NATALIA drain in place with knee immobilizer. Voiding with no difficulty. Tolerating diet well. Plan for iv abx. Discussed plan of care with patient. Safety precautions in place.

## 2023-07-08 LAB
CREAT BLD-MCNC: 0.41 MG/DL
GFR SERPLBLD BASED ON 1.73 SQ M-ARVRAT: 110 ML/MIN/1.73M2 (ref 60–?)
VANCOMYCIN PEAK SERPL-MCNC: 26.7 UG/ML (ref 30–50)
VANCOMYCIN TROUGH SERPL-MCNC: 10 UG/ML (ref 10–20)

## 2023-07-08 PROCEDURE — 82565 ASSAY OF CREATININE: CPT | Performed by: STUDENT IN AN ORGANIZED HEALTH CARE EDUCATION/TRAINING PROGRAM

## 2023-07-08 PROCEDURE — 97166 OT EVAL MOD COMPLEX 45 MIN: CPT

## 2023-07-08 PROCEDURE — 05HY33Z INSERTION OF INFUSION DEVICE INTO UPPER VEIN, PERCUTANEOUS APPROACH: ICD-10-PCS | Performed by: INTERNAL MEDICINE

## 2023-07-08 PROCEDURE — 80202 ASSAY OF VANCOMYCIN: CPT | Performed by: STUDENT IN AN ORGANIZED HEALTH CARE EDUCATION/TRAINING PROGRAM

## 2023-07-08 PROCEDURE — 97530 THERAPEUTIC ACTIVITIES: CPT

## 2023-07-08 PROCEDURE — 97161 PT EVAL LOW COMPLEX 20 MIN: CPT

## 2023-07-08 PROCEDURE — 97535 SELF CARE MNGMENT TRAINING: CPT

## 2023-07-08 NOTE — CM/SW NOTE
Spoke with pt by phone regarding DC planning. Reviewed anticipated need for IV abx and pt confirmed she has already received PICC. Final orders pending. Discussed options for return to [facility] pending insurance auth vs DC to home setting with [facility] and home infusion. Pt does not feel ready to return to home as she would need to manage stairs in the house. She is hopeful her weight bearing status will change soon such that she can better manage mobility in home setting. Informed pt of option for [facility] respite program, but pt stated they are not able to afford private pay costs for this. At this time, pt confirmed that she would like to return to [facility] for continued VALENTE. Pt is aware that she has few insurance covered VALENTE days remaining. Plan for [facility] to request insurance auth Monday once insurance open (closed on weekends) and MD recommendations for IV abx finalized. / to remain available for support and/or discharge planning.      Ceasar Walden LCSW  Discharge Planner  [phone]

## 2023-07-08 NOTE — PHYSICAL THERAPY NOTE
PHYSICAL THERAPY EVALUATION - INPATIENT     Room Number: 382/382-A  Evaluation Date: 7/8/2023  Type of Evaluation: Initial  Physician Order: PT Eval and Treat    Presenting Problem: cellulitis L LE  Co-Morbidities : L TKR 4/10/23, s/p debridement and gastroc skin flap with plastic surgery 6/21/23, RA, HTN  Reason for Therapy: Mobility Dysfunction and Discharge Planning    History related to current admission: Patient is a 58year old female admitted on 7/6/2023 from [address] [facility])  for L LE swelling. Pt diagnosed with cellulitis L LE. Recent admission:  6/11-6/13/23: not seen by rehab, back to [facility]  4/10-4/12/23 L TKR rec [facility]  Past admission:  Admit 8/26-8/30/22: s/p reverse TSA 8/25, dc'd [facility]  Admit 3/2-3/7/22: C4-C5 ACDF, dc'd acute  Admit 10/26-11/2/21: L4-L5 fusion, dc'd [facility]    ASSESSMENT   In this PT evaluation, the patient presents with the following impairments decrease balance, TTWB status and L knee immobilizer, decrease strength, increase pain and swelling L knee. These impairments and comorbidities manifest themselves as functional limitations in independent bed mobility, transfers, and gait. The patient is below baseline and would benefit from skilled inpatient PT to address the above deficits to assist patient in returning to prior to level of function. Functional outcome measures completed include AMPA. The AM-PAC '6-Clicks' Inpatient Basic Mobility Short Form was completed and this patient is demonstrating a Approx Degree of Impairment: 64.91%  degree of impairment in mobility. Research supports that patients with this level of impairment may benefit from sub-acute rehabilitation. DISCHARGE RECOMMENDATIONS  PT Discharge Recommendations: Sub-acute rehabilitation    PLAN  PT Treatment Plan: Bed mobility; Energy conservation;Patient education; Family education;Gait training;Strengthening;Transfer training;Balance training  Rehab Potential : Good  Frequency (Obs): 3-5x/week  Number of Visits to Meet Established Goals: 3      CURRENT GOALS    Goal #1 Patient is able to demonstrate supine - sit EOB @ level: modified independent     Goal #2 Patient is able to demonstrate transfers Sit to/from Stand at assistance level: supervision     Goal #3 Patient is able to ambulate 5 feet with assist device: walker - rolling at assistance level: maximum assistance and able to maintain TTWB L LE. Goal #4    Goal #5    Goal #6    Goal Comments: Goals established on 2023    HOME SITUATION  Type of Home: House   Home Layout: Multi-level  Stairs to Enter : 6  Railing: Yes  Stairs to Bedroom: 6  Railing: Yes    Lives With: Spouse  Drives: No  Patient Owned Equipment: Rolling walker;Rollator; Wheelchair  Patient Regularly Uses: Glasses    Prior Level of [place]: per pt reports, pt initiated amb in parallel bars with TTWB L LE prior to admit. Prior to L TKR pt living in split level home with spouse, who works from home. Pt has part-time caregiver. Pt currently applying for disability. SUBJECTIVE  \"I know what I need to do. \"      OBJECTIVE  Precautions: Knee immobilizer;Bed/chair alarm;Drain(s)  Fall Risk: High fall risk    WEIGHT BEARING RESTRICTION  Weight Bearing Restriction: L lower extremity           L Lower Extremity: Toe Touch Weight Bearing    PAIN ASSESSMENT  Ratin  Location: L knee  Management Techniques: Repositioning    COGNITION  Overall Cognitive Status:  WFL - within functional limits    RANGE OF MOTION AND STRENGTH ASSESSMENT  Upper extremity ROM and strength: see OT eval    Lower extremity ROM is within functional limits except L knee not tested due to immobilizer and L LE cellulitis. Lower extremity strength:  R LE WFL, L LE below functional limits.       BALANCE  Static Sitting: Good  Dynamic Sitting: Good  Static Standing: Fair (with RW)  Dynamic Standing: Fair - (with RW)    ADDITIONAL TESTS                                    ACTIVITY TOLERANCE                         O2 WALK NEUROLOGICAL FINDINGS                        AM-PAC '6-Clicks' INPATIENT SHORT FORM - BASIC MOBILITY  How much difficulty does the patient currently have. .. Patient Difficulty: Turning over in bed (including adjusting bedclothes, sheets and blankets)?: A Little   Patient Difficulty: Sitting down on and standing up from a chair with arms (e.g., wheelchair, bedside commode, etc.): A Little   Patient Difficulty: Moving from lying on back to sitting on the side of the bed?: A Little   How much help from another person does the patient currently need. .. Help from Another: Moving to and from a bed to a chair (including a wheelchair)?: A Lot   Help from Another: Need to walk in hospital room?: Total   Help from Another: Climbing 3-5 steps with a railing?: Total       AM-PAC Score:  Raw Score: 13   Approx Degree of Impairment: 64.91%   Standardized Score (AM-PAC Scale): 36.74   CMS Modifier (G-Code): CL    FUNCTIONAL ABILITY STATUS  Gait Assessment   Functional Mobility/Gait Assessment  Gait Assistance: Not tested  Distance (ft): 0    Skilled Therapy Provided     Bed Mobility:  Rolling: min assist  Supine to sit: supervision with HOB elevated   Sit to supine: not tested     Transfer Mobility:  Sit to stand: min assist from EOB to RW   Stand to sit: min assist  Gait = not tested    Therapist's Comments: per RN pt ok to be seen. Pt received in bed and agreeable to therapy. Pt denies dizziness. Pt with L knee immobilizer in place. Pt mobility as above. Pt sit to stand from EOB to RW with min assist x1. Pt requires min assist x2  with RW for dressing, assist of one steady pt and 2nd person to assist with dressing. Pt stand pivot with RW to bedside chair with min assist and pt noted to have difficulty maintaining TTWB L LE. Pt seated in bedside chair, B LE elevated. Pt educated on activity/discharge recommendations, call don't fall, and questions answered. Pt chair alarm set and RN/SW updated.     Exercise/Education Provided:  Bed mobility  Energy conservation  Functional activity tolerated  Gait training  Posture  Transfer training    Patient End of Session: Up in chair;Needs met;Call light within reach;RN aware of session/findings; All patient questions and concerns addressed      Patient Evaluation Complexity Level:  History High - 3 or more personal factors and/or co-morbidities   Examination of body systems Moderate - addressing a total of 3 or more elements   Clinical Presentation Low - Stable   Clinical Decision Making Low - Stable       PT Session Time: 30 minutes    Therapeutic Activity: 10 minutes

## 2023-07-08 NOTE — PLAN OF CARE
A/o x4. RA//Is encouraged. SCD to RLE/ankle pumps encouraged. Regular diet. Voiding without difficulty. LBM 7/7. Pain managed with po pain medication. NATALIA drain to bulb suction. Acebandage and kerlix fluff roll to LLE. PICC to RUE blood return noted, flushed and saline locked. Bcx and Wcx pending. POC updated with pt. All safety measures in place. Call light within reach instructed pt to call for help or assistance.

## 2023-07-08 NOTE — PLAN OF CARE
Pt Aox4, , RA. SCD to RLE. Heparin subcutaneous. Purewick in place, voiding adequate amounts. Up to commode with x1 assist.  [place] given for pain. KI on LLE. Dressing to knee CDI, to be changed tomorrow. PICC to RUE SL. IV Vanco, po Flagyl. NATALIA drain in place with minimal serous output. TTWB, pt maintains well. Plan to Dc to [facility] when ready.

## 2023-07-09 LAB
ANION GAP SERPL CALC-SCNC: 5 MMOL/L (ref 0–18)
BASOPHILS # BLD AUTO: 0.03 X10(3) UL (ref 0–0.2)
BASOPHILS NFR BLD AUTO: 0.6 %
BUN BLD-MCNC: 9 MG/DL (ref 7–18)
CALCIUM BLD-MCNC: 9.1 MG/DL (ref 8.5–10.1)
CHLORIDE SERPL-SCNC: 104 MMOL/L (ref 98–112)
CO2 SERPL-SCNC: 28 MMOL/L (ref 21–32)
CREAT BLD-MCNC: 0.46 MG/DL
EOSINOPHIL # BLD AUTO: 0.32 X10(3) UL (ref 0–0.7)
EOSINOPHIL NFR BLD AUTO: 6 %
ERYTHROCYTE [DISTWIDTH] IN BLOOD BY AUTOMATED COUNT: 14.6 %
GFR SERPLBLD BASED ON 1.73 SQ M-ARVRAT: 107 ML/MIN/1.73M2 (ref 60–?)
GLUCOSE BLD-MCNC: 96 MG/DL (ref 70–99)
HCT VFR BLD AUTO: 27.4 %
HGB BLD-MCNC: 8.8 G/DL
IMM GRANULOCYTES # BLD AUTO: 0.02 X10(3) UL (ref 0–1)
IMM GRANULOCYTES NFR BLD: 0.4 %
LYMPHOCYTES # BLD AUTO: 0.95 X10(3) UL (ref 1–4)
LYMPHOCYTES NFR BLD AUTO: 17.8 %
MCH RBC QN AUTO: 28.2 PG (ref 26–34)
MCHC RBC AUTO-ENTMCNC: 32.1 G/DL (ref 31–37)
MCV RBC AUTO: 87.8 FL
MONOCYTES # BLD AUTO: 0.64 X10(3) UL (ref 0.1–1)
MONOCYTES NFR BLD AUTO: 12 %
NEUTROPHILS # BLD AUTO: 3.38 X10 (3) UL (ref 1.5–7.7)
NEUTROPHILS # BLD AUTO: 3.38 X10(3) UL (ref 1.5–7.7)
NEUTROPHILS NFR BLD AUTO: 63.2 %
OSMOLALITY SERPL CALC.SUM OF ELEC: 283 MOSM/KG (ref 275–295)
PLATELET # BLD AUTO: 324 10(3)UL (ref 150–450)
POTASSIUM SERPL-SCNC: 3.9 MMOL/L (ref 3.5–5.1)
RBC # BLD AUTO: 3.12 X10(6)UL
SODIUM SERPL-SCNC: 137 MMOL/L (ref 136–145)
WBC # BLD AUTO: 5.3 X10(3) UL (ref 4–11)

## 2023-07-09 PROCEDURE — 80048 BASIC METABOLIC PNL TOTAL CA: CPT | Performed by: HOSPITALIST

## 2023-07-09 PROCEDURE — 85025 COMPLETE CBC W/AUTO DIFF WBC: CPT | Performed by: HOSPITALIST

## 2023-07-09 NOTE — PLAN OF CARE
Pt Aox4, RA,  at night. Pt denies pain at this time. PICC to STEPAN. IV Vanco, po Flagyl. Dressing to L knee changed today per order. [facility]I [facility]. X1 assist to transfer to chair/commode. NATALIA drain in place with minimal serous output. Tegaderm to skin graft site, CDI. WB status upgraded to WBAT today. Plan to DC to [facility] when cleared.

## 2023-07-09 NOTE — PROGRESS NOTES
Pharmacy Dosing Service  Follow-up Pharmacokinetic Consult for Vancomycin Dosing          Aurea Mckay is a 58year old female who is being treated for L knee surgical wound infection w/ cellulitis. Vancomycin (7/6-      Est CrCl: >80 mL/min      Cultures:  - 6/13 drainage & OR Cx grew MSSA, Strep mitis/oralis, Prevotella & Corynebacterium   - all new Cx ngtd      Vancomycin trough:  9.2 ug/mL     Vancomycin peak:  30.4 ug/mL     Corresponding 24 h-AUC:  430 mg-h/L     Pharmacokinetic target:  to 600 mg-h/L and trough <=15 mg/L          A/P:  1. Will continue  vancomycin 1000mg q12hr based on therapeutic AUC/trough. Do not need to continue AUC dosing if MRSA is not isolated during this admission. 2.  Will re-check a vancomycin trough  3-5 days . Goal trough level 10-15 ug/mL. 3.  Pharmacy will need SCr daily while on vancomycin to assess renal function. Pharmacy will continue to follow her and adjust dosing as appropriate.       Monster Bernal, PharmD, BCPS  7/8/2023  7:27 PM  [address] [place] [address] Extension: [phone]

## 2023-07-09 NOTE — PLAN OF CARE
A&Ox4 vital signs stable on RA. Tolerating diet, SCD to RLE, IS encouraged. Pt c/o moderate pain, PRN medication provided. Dressing site C/D/I. Knee immobilizer in place. Voiding via purewick. IV Vanco & PO Flagyl. NATALIA drain intact, minimal output. TTWB. DC back to [facility] when cleared. POC discussed, pt verbalized understanding. No further questions at this time. Call light within reach.

## 2023-07-10 LAB
ANION GAP SERPL CALC-SCNC: 6 MMOL/L (ref 0–18)
BASOPHILS # BLD AUTO: 0.03 X10(3) UL (ref 0–0.2)
BASOPHILS NFR BLD AUTO: 0.6 %
BASOPHILS NFR SNV: 0 %
BUN BLD-MCNC: 8 MG/DL (ref 7–18)
CALCIUM BLD-MCNC: 9.1 MG/DL (ref 8.5–10.1)
CHLORIDE SERPL-SCNC: 103 MMOL/L (ref 98–112)
CO2 SERPL-SCNC: 27 MMOL/L (ref 21–32)
CREAT BLD-MCNC: 0.57 MG/DL
CRYSTALS SNV QL MICRO: NEGATIVE
EOSINOPHIL # BLD AUTO: 0.26 X10(3) UL (ref 0–0.7)
EOSINOPHIL NFR BLD AUTO: 4.9 %
EOSINOPHIL NFR SNV: 0 %
ERYTHROCYTE [DISTWIDTH] IN BLOOD BY AUTOMATED COUNT: 14.8 %
GFR SERPLBLD BASED ON 1.73 SQ M-ARVRAT: 101 ML/MIN/1.73M2 (ref 60–?)
GLUCOSE BLD-MCNC: 129 MG/DL (ref 70–99)
GRANULOCYTES # SNV AUTO: 741 /MM3 (ref 0–200)
HCT VFR BLD AUTO: 28.1 %
HGB BLD-MCNC: 8.6 G/DL
IMM GRANULOCYTES # BLD AUTO: 0.01 X10(3) UL (ref 0–1)
IMM GRANULOCYTES NFR BLD: 0.2 %
LYMPHOCYTES # BLD AUTO: 0.95 X10(3) UL (ref 1–4)
LYMPHOCYTES NFR BLD AUTO: 18 %
LYMPHOCYTES NFR SNV: 19 %
MCH RBC QN AUTO: 27.9 PG (ref 26–34)
MCHC RBC AUTO-ENTMCNC: 30.6 G/DL (ref 31–37)
MCV RBC AUTO: 91.2 FL
MONOCYTES # BLD AUTO: 0.61 X10(3) UL (ref 0.1–1)
MONOCYTES NFR BLD AUTO: 11.5 %
MONOS+MACROS NFR SNV: 59 %
NEUTROPHILS # BLD AUTO: 3.43 X10 (3) UL (ref 1.5–7.7)
NEUTROPHILS # BLD AUTO: 3.43 X10(3) UL (ref 1.5–7.7)
NEUTROPHILS NFR BLD AUTO: 64.8 %
NEUTROPHILS NFR SNV: 22 %
OSMOLALITY SERPL CALC.SUM OF ELEC: 282 MOSM/KG (ref 275–295)
PLATELET # BLD AUTO: 237 10(3)UL (ref 150–450)
POTASSIUM SERPL-SCNC: 4.1 MMOL/L (ref 3.5–5.1)
RBC # BLD AUTO: 3.08 X10(6)UL
RBC # FLD AUTO: ABNORMAL /MM3 (ref ?–1)
SODIUM SERPL-SCNC: 136 MMOL/L (ref 136–145)
TOTAL CELLS COUNTED FLD: 100
WBC # BLD AUTO: 5.3 X10(3) UL (ref 4–11)

## 2023-07-10 PROCEDURE — 89050 BODY FLUID CELL COUNT: CPT | Performed by: ORTHOPAEDIC SURGERY

## 2023-07-10 PROCEDURE — 87205 SMEAR GRAM STAIN: CPT | Performed by: ORTHOPAEDIC SURGERY

## 2023-07-10 PROCEDURE — 0S9D3ZX DRAINAGE OF LEFT KNEE JOINT, PERCUTANEOUS APPROACH, DIAGNOSTIC: ICD-10-PCS | Performed by: ORTHOPAEDIC SURGERY

## 2023-07-10 PROCEDURE — 97530 THERAPEUTIC ACTIVITIES: CPT

## 2023-07-10 PROCEDURE — 97535 SELF CARE MNGMENT TRAINING: CPT

## 2023-07-10 PROCEDURE — 97110 THERAPEUTIC EXERCISES: CPT

## 2023-07-10 PROCEDURE — 87070 CULTURE OTHR SPECIMN AEROBIC: CPT | Performed by: ORTHOPAEDIC SURGERY

## 2023-07-10 PROCEDURE — 89060 EXAM SYNOVIAL FLUID CRYSTALS: CPT | Performed by: ORTHOPAEDIC SURGERY

## 2023-07-10 PROCEDURE — 80048 BASIC METABOLIC PNL TOTAL CA: CPT | Performed by: HOSPITALIST

## 2023-07-10 PROCEDURE — 85025 COMPLETE CBC W/AUTO DIFF WBC: CPT | Performed by: HOSPITALIST

## 2023-07-10 RX ORDER — GABAPENTIN 400 MG/1
400 CAPSULE ORAL 3 TIMES DAILY
Status: DISCONTINUED | OUTPATIENT
Start: 2023-07-10 | End: 2023-07-11

## 2023-07-10 RX ORDER — GABAPENTIN 400 MG/1
400 CAPSULE ORAL 3 TIMES DAILY
Status: DISCONTINUED | OUTPATIENT
Start: 2023-07-10 | End: 2023-07-10

## 2023-07-10 NOTE — PHYSICAL THERAPY NOTE
PHYSICAL THERAPY TREATMENT NOTE - INPATIENT    Room Number: 382/382-A     Session: 1     Number of Visits to Meet Established Goals: 3    Presenting Problem: cellulitis L LE  Co-Morbidities : L TKR 4/10/23, s/p debridement and gastroc skin flap with plastic surgery 6/21/23, RA, HTN    History related to current admission:   7/9/23: Order changed WB from TTWB-> WBAT per Dr. Samantha Larkin   Patient is a 58year old female admitted on 7/6/2023 from [address] [facility])  for L LE swelling. Pt diagnosed with cellulitis L LE. Recent admission:  6/11-6/13/23: not seen by rehab, back to [facility]  4/10-4/12/23 L TKR rec [facility]  Past admission:  Admit 8/26-8/30/22: s/p reverse TSA 8/25, dc'd [facility]  Admit 3/2-3/7/22: C4-C5 ACDF, dc'd acute  Admit 10/26-11/2/21: L4-L5 fusion, dc'd [facility]         ASSESSMENT     The patient continues to progress and work towards the goals set for her by IP therapy. The patient recently was upgraded from [facility] to [facility] on her LLE which allowed her to perform more functional mobility and ambulate further in the hospital [place]. The patient continues to present with decreased balance, L knee immobilizer, and decreased strength. The patient verbalized strong desire to return to [facility] to improve strength and functional activities tolerated before returning back to home and noted concerns with stair navigation as she cannot live on one single level. These impairments and comorbidities manifest themselves as functional limitations in independent bed mobility, transfers, and gait. The patient is below baseline and would benefit from skilled inpatient PT to address the above deficits to assist patient in returning to prior to level of function. DISCHARGE RECOMMENDATIONS  PT Discharge Recommendations: Sub-acute rehabilitation     PLAN  PT Treatment Plan: Bed mobility; Energy conservation;Patient education; Family education;Gait training;Strengthening;Transfer training;Balance training  Rehab Potential : Good  Frequency (Obs): 3-5x/week    CURRENT GOALS       Goal #1 Patient is able to demonstrate supine - sit EOB @ level: modified independent      Goal #2 Patient is able to demonstrate transfers Sit to/from Stand at assistance level: supervision      Goal #3 Patient is able to ambulate 5 feet with assist device: walker - rolling at assistance level: maximum assistance and able to maintain TTWB L LE. (Goal met 7/10 due to updated WB)        Goal #4  Patient is able to ambulate 50 feet with assist device: walker - rolling at assistance level: supervision (updated 7/10)      Goal #5  Patient as able to ascend/descend 6 steps with assist device: 1 rail: at assistance level: supervision (est. _   Goal #6     Goal Comments: Goals established on 2023    7/10/2023 goal 3 complete, goal 1-2, 4-5 ongoing      SUBJECTIVE  \"I cannot return home\"     OBJECTIVE  Precautions: Knee immobilizer;Bed/chair alarm;Drain(s)    WEIGHT BEARING RESTRICTION  Weight Bearing Restriction: L lower extremity           L Lower Extremity: Weight Bearing as Tolerated    PAIN ASSESSMENT   Ratin  Location: L knee  Management Techniques: Repositioning    BALANCE                                                                                                                       Static Sitting: Good  Dynamic Sitting: Good           Static Standing: Fair -  Dynamic Standing: Fair -    ACTIVITY TOLERANCE                         O2 WALK         AM-PAC '6-Clicks' INPATIENT SHORT FORM - BASIC MOBILITY  How much difficulty does the patient currently have. .. Patient Difficulty: Turning over in bed (including adjusting bedclothes, sheets and blankets)?: None   Patient Difficulty: Sitting down on and standing up from a chair with arms (e.g., wheelchair, bedside commode, etc.): A Little   Patient Difficulty: Moving from lying on back to sitting on the side of the bed?: A Little   How much help from another person does the patient currently need. .. Help from Another: Moving to and from a bed to a chair (including a wheelchair)?: A Little   Help from Another: Need to walk in hospital room?: A Little   Help from Another: Climbing 3-5 steps with a railing?: A Lot       AM-PAC Score:  Raw Score: 18   Approx Degree of Impairment: 46.58%   Standardized Score (AM-PAC Scale): 43.63   CMS Modifier (G-Code): CK    FUNCTIONAL ABILITY STATUS  Gait Assessment   Functional Mobility/Gait Assessment  Gait Assistance: Contact guard assist  Distance (ft): 80  Assistive Device: Rolling walker  Pattern: R Decreased stance time (decreased sonja, Lateral trunk lean, step to gait)    Skilled Therapy Provided    Bed Mobility:  Rolling: Mod I    Supine<>Sit: supervision    Sit<>Supine: supervision      Transfer Mobility:  Sit<>Stand: CGA with RW    Stand<>Sit: supervision; vc to place one UE on sitting surface   Gait: CGA with RW; patient with Chair follow     Therapist's Comments: Patient supine in bed and agreeable to therapy. Patient informed on updated WB status and increased ability to perform functional mobility. Patient needed assist to don shorts prior to ambulation. Patient first time ambulating since beginning of June. Patient verbalization concerns to return home and requested return to [facility]. Patient ambulated 80 feet and had to use chair to return to room. Patient able to toilet with grab bars and use of RW for support with supervision. Patient reminded to call nursing staff for any personal needs or mobility requests; patient agreed. THERAPEUTIC EXERCISES  Lower Extremity Toe raises  Weight shifting      Upper Extremity      Position Standing     Repetitions   10   Sets   1     Patient End of Session: Up in chair;Needs met;Call light within reach;RN aware of session/findings; All patient questions and concerns addressed;SCDs in place    PT Session Time: 40 minutes  Therapeutic Activity: 30 minutes  Therapeutic Exercise: 10 minutes

## 2023-07-10 NOTE — PLAN OF CARE
Pt Aox4,   RA, PICC to STEPAN completely occluded this AM, Alteplase instilled x2 with good results- line now patent. Dressing to L knee changed. Pt able to ambulate with PT in hallway, progressing well. IV Vanco and po Flagyl, plan to DC to [facility] when approved.

## 2023-07-10 NOTE — CM/SW NOTE
Updates sent to [facility] via [employer]. Message sent asking them to request insurance auth. Await MD orders for IV abx. Pt with PICC placed and note indicating plan for 4-6 weeks of IV abx. / to remain available for support and/or discharge planning.      [facility]  F:[phone]   [address]  Discharge Planner  [phone]

## 2023-07-10 NOTE — PLAN OF CARE
A&Ox4 vital signs stable on RA. Tolerating diet, SCD to RLE, IS encouraged. Pt c/o moderate pain, PRN medication provided. Dressing site C/D/I. Knee immobilizer in place. Voiding via purewick. IV Vanco & PO Flagyl. NATALIA drain intact, minimal output. WBAT, ambulating well with front wheel walker and stand by assist.. DC back to [facility] when cleared. POC discussed, pt verbalized understanding. No further questions at this time. Call light within reach.

## 2023-07-10 NOTE — CM/SW NOTE
07/10/23 1100   CM/SW Referral Data   Referral Source Social Work (self-referral)   Reason for Referral Readmission   Informant EMR   Readmission Assessment   Factors that patient feels contributed to this readmission Acute/Chronic Clinical Presentation   Pt's living situation prior to admission? Subacute rehab  [facility] ([facility]   Pt's level of independence at discharge? Some assist (mod)   Pt. received education on diagnoses at time of discharge? Yes   Did any new symptoms or issues develop after you were discharged? Yes   ----Post D/C symptoms: Symptoms/issue related to previous hospitalization Yes   Were medications taken as indicated on discharge instructions? Yes  (medications managed by SNF)   Did you have a follow-up appointment scheduled at time of discharge? No   Was full assessment completed by MARICARMEN/FILIPE on prior admission? Yes   Was the recommended discharge plan achieved? Yes   Was pt. discharged w/out services? No     Pt assessed for 30-day readmission. LACE readmission score 61. Previous admission from 6/12-6/30. Pt had an I/D done by Dr. Jt Cline on 6/12, and LLE medial gastrocnemius flap with split thickness skin graft by plastic surgery, on 6/21. Pt was discharged to [facility] for [facility], had previously been from home w/ [facility]. Pt did not have scheduled HFU with PCP at time of DC. She had follow up with Dr. Daisy Valdes scheduled 7/12, snd she had follow up w/ Plastic Surgery on 7/6. At this appointment was noted to have redness and swelling to LLE, for which she was directed to the ER. Current plan is for DC to [facility]  w/ IV antibiotics pending insurance authorization. FILIPE/MARICARMEN will remain available for DC planning and/or support.      EMMANUEL Rodríguez, VIA [facility]    [id number]

## 2023-07-11 VITALS
RESPIRATION RATE: 18 BRPM | OXYGEN SATURATION: 98 % | HEIGHT: 61 IN | SYSTOLIC BLOOD PRESSURE: 138 MMHG | BODY MASS INDEX: 30.21 KG/M2 | HEART RATE: 85 BPM | DIASTOLIC BLOOD PRESSURE: 66 MMHG | WEIGHT: 160 LBS | TEMPERATURE: 99 F

## 2023-07-11 LAB
ANION GAP SERPL CALC-SCNC: 5 MMOL/L (ref 0–18)
BASOPHILS # BLD AUTO: 0.05 X10(3) UL (ref 0–0.2)
BASOPHILS NFR BLD AUTO: 0.9 %
BUN BLD-MCNC: 9 MG/DL (ref 7–18)
CALCIUM BLD-MCNC: 9 MG/DL (ref 8.5–10.1)
CHLORIDE SERPL-SCNC: 106 MMOL/L (ref 98–112)
CO2 SERPL-SCNC: 27 MMOL/L (ref 21–32)
CREAT BLD-MCNC: 0.48 MG/DL
EOSINOPHIL # BLD AUTO: 0.34 X10(3) UL (ref 0–0.7)
EOSINOPHIL NFR BLD AUTO: 6 %
ERYTHROCYTE [DISTWIDTH] IN BLOOD BY AUTOMATED COUNT: 14.8 %
GFR SERPLBLD BASED ON 1.73 SQ M-ARVRAT: 106 ML/MIN/1.73M2 (ref 60–?)
GLUCOSE BLD-MCNC: 90 MG/DL (ref 70–99)
HCT VFR BLD AUTO: 27.4 %
HGB BLD-MCNC: 8.5 G/DL
IMM GRANULOCYTES # BLD AUTO: 0.02 X10(3) UL (ref 0–1)
IMM GRANULOCYTES NFR BLD: 0.4 %
LYMPHOCYTES # BLD AUTO: 1.03 X10(3) UL (ref 1–4)
LYMPHOCYTES NFR BLD AUTO: 18.2 %
MCH RBC QN AUTO: 27.8 PG (ref 26–34)
MCHC RBC AUTO-ENTMCNC: 31 G/DL (ref 31–37)
MCV RBC AUTO: 89.5 FL
MONOCYTES # BLD AUTO: 0.64 X10(3) UL (ref 0.1–1)
MONOCYTES NFR BLD AUTO: 11.3 %
NEUTROPHILS # BLD AUTO: 3.59 X10 (3) UL (ref 1.5–7.7)
NEUTROPHILS # BLD AUTO: 3.59 X10(3) UL (ref 1.5–7.7)
NEUTROPHILS NFR BLD AUTO: 63.2 %
OSMOLALITY SERPL CALC.SUM OF ELEC: 284 MOSM/KG (ref 275–295)
PLATELET # BLD AUTO: 326 10(3)UL (ref 150–450)
POTASSIUM SERPL-SCNC: 4.1 MMOL/L (ref 3.5–5.1)
RBC # BLD AUTO: 3.06 X10(6)UL
SODIUM SERPL-SCNC: 138 MMOL/L (ref 136–145)
WBC # BLD AUTO: 5.7 X10(3) UL (ref 4–11)

## 2023-07-11 PROCEDURE — 85025 COMPLETE CBC W/AUTO DIFF WBC: CPT | Performed by: HOSPITALIST

## 2023-07-11 PROCEDURE — 80048 BASIC METABOLIC PNL TOTAL CA: CPT | Performed by: HOSPITALIST

## 2023-07-11 RX ORDER — HYDROCODONE BITARTRATE AND ACETAMINOPHEN 5; 325 MG/1; MG/1
1 TABLET ORAL EVERY 4 HOURS PRN
Qty: 12 TABLET | Refills: 0 | Status: SHIPPED | OUTPATIENT
Start: 2023-07-11

## 2023-07-11 RX ORDER — GABAPENTIN 400 MG/1
400 CAPSULE ORAL 3 TIMES DAILY
Qty: 90 CAPSULE | Refills: 0 | Status: SHIPPED | COMMUNITY
Start: 2023-07-11

## 2023-07-11 RX ORDER — METRONIDAZOLE 500 MG/1
500 TABLET ORAL 3 TIMES DAILY
Qty: 84 TABLET | Refills: 0 | Status: SHIPPED | OUTPATIENT
Start: 2023-07-11 | End: 2023-08-08

## 2023-07-11 RX ORDER — ACETAMINOPHEN 500 MG
500 TABLET ORAL EVERY 6 HOURS PRN
Qty: 120 TABLET | Refills: 0 | Status: SHIPPED | COMMUNITY
Start: 2023-07-11

## 2023-07-11 RX ORDER — VANCOMYCIN/0.9 % SOD CHLORIDE 1 G/100 ML
1 PLASTIC BAG, INJECTION (ML) INTRAVENOUS EVERY 12 HOURS
Qty: 56 EACH | Refills: 0 | Status: SHIPPED | OUTPATIENT
Start: 2023-07-11 | End: 2023-08-08

## 2023-07-11 NOTE — DISCHARGE INSTRUCTIONS
Wound care to Left lower extremity Every 48 hours and as needed:  Cleanse with saline  Apply Xeroform to the flap/skin graft and posterior calf incision  Cover with gauze, ABD pad, kerlix. Apply ace wrap to the foot and calf area ONLY. Re-apply knee immobilizer.

## 2023-07-11 NOTE — CM/SW NOTE
Informed by [facility] that they have received insurance auth and can accept pt for admission. Updated RN/MD.  Await medical clearance for discharge. / to remain available for support and/or discharge planning. [facility]  I:[phone]   [address], LCSW  Discharge Planner  [phone]    Addendum:  Spoke with pt's RN who stated pt can discharge to [facility] today. [facility] transport scheduled for 7pm (earliest time available). PCS form completed and available for RN to print. Updated Brynn from [facility] [place] who confirmed pt can be accepted for late admit today. Updated pt at bedside and discussed costs for transport. Pt agreeable with plan. No further needs at this time.      [facility]  [phone]

## 2023-07-11 NOTE — PLAN OF CARE
Aox4, reporting baseline neuropathy to LLE, dressing to LLE c/d/I to be changed Q48, on IV + PO abx, Right PICC line functioning with no issues, NATALIA drain functioning, on room air , on Heparin subcutaneous, voiding via purewick or bathroom, up WBAT and assist + RW, plan to dc to [facility] with IV abx, no further needs.

## 2023-07-11 NOTE — PLAN OF CARE
Problem: PAIN - ADULT  Goal: Verbalizes/displays adequate comfort level or patient's stated pain goal  Description: INTERVENTIONS:  - Encourage pt to monitor pain and request assistance  - Assess pain using appropriate pain scale  - Administer analgesics based on type and severity of pain and evaluate response  - Implement non-pharmacological measures as appropriate and evaluate response  - Consider cultural and social influences on pain and pain management  - Manage/alleviate anxiety  - Utilize distraction and/or relaxation techniques  - Monitor for opioid side effects  - Notify MD/LIP if interventions unsuccessful or patient reports new pain  - Anticipate increased pain with activity and pre-medicate as appropriate  Outcome: Adequate for Discharge     Problem: SAFETY ADULT - FALL  Goal: Free from fall injury  Description: INTERVENTIONS:  - Assess pt frequently for physical needs  - Identify cognitive and physical deficits and behaviors that affect risk of falls.   - [place] fall precautions as indicated by assessment.  - Educate pt/family on patient safety including physical limitations  - Instruct pt to call for assistance with activity based on assessment  - Modify environment to reduce risk of injury  - Provide assistive devices as appropriate  - Consider OT/PT consult to assist with strengthening/mobility  - Encourage toileting schedule  Outcome: Adequate for Discharge   Plan for discharge to [address] [facility], waiting for insurance approval

## 2023-07-11 NOTE — PROGRESS NOTES
Follow cultures. Cell count result shows no signs of TKR infection. Fu with Plastics after discharge. Fu with me 4 weeks. CELL CT/DIF & CRYSTAL SYNOVIAL [[id number]]    Collected: 07/10/23 1734    Updated: 07/10/23 1939    Specimen Type: Body fluid, unspecified     Neutrophil Synovial 22 %    Lymph Synovial 19 %    Mon/Mac/Hist Synovial 59 %    Eosinophil Synovial 0 %    Basophil Synovial 0 %    Total Cells Counted 100   Cell Count,Diff, and Crystals Synovial [[id number]] (Abnormal)    Collected: 07/10/23 1734    Updated: 07/10/23 1937    Specimen Type:  Body fluid, unspecified     Body Fluid Color Shila    Body Fluid Clarity Hazy    WBC, Synovial Fluid 741 High  /mm3    RBC Synovial 13,000 High  /mm3    Source Crystals L knee    Crystals --       Kalee La MD

## 2023-07-12 ENCOUNTER — TELEPHONE (OUTPATIENT)
Dept: WOUND CARE | Facility: HOSPITAL | Age: 63
End: 2023-07-12

## 2023-07-12 ENCOUNTER — INITIAL APN SNF VISIT (OUTPATIENT)
Dept: INTERNAL MEDICINE CLINIC | Age: 63
End: 2023-07-12

## 2023-07-12 DIAGNOSIS — R60.0 EDEMA OF LEFT LOWER LEG: ICD-10-CM

## 2023-07-12 DIAGNOSIS — L03.90 CELLULITIS, UNSPECIFIED CELLULITIS SITE: ICD-10-CM

## 2023-07-12 DIAGNOSIS — R53.81 PHYSICAL DECONDITIONING: ICD-10-CM

## 2023-07-12 DIAGNOSIS — M25.562 LEFT KNEE PAIN, UNSPECIFIED CHRONICITY: Primary | ICD-10-CM

## 2023-07-12 DIAGNOSIS — Z98.890 S/P LEFT KNEE SURGERY: ICD-10-CM

## 2023-07-12 DIAGNOSIS — M05.79 RHEUMATOID ARTHRITIS INVOLVING MULTIPLE SITES WITH POSITIVE RHEUMATOID FACTOR (HCC): ICD-10-CM

## 2023-07-12 DIAGNOSIS — R53.1 WEAKNESS: ICD-10-CM

## 2023-07-12 DIAGNOSIS — E78.5 HYPERLIPIDEMIA, UNSPECIFIED HYPERLIPIDEMIA TYPE: ICD-10-CM

## 2023-07-12 PROCEDURE — 3078F DIAST BP <80 MM HG: CPT | Performed by: NURSE PRACTITIONER

## 2023-07-12 PROCEDURE — 3075F SYST BP GE 130 - 139MM HG: CPT | Performed by: NURSE PRACTITIONER

## 2023-07-12 PROCEDURE — 99310 SBSQ NF CARE HIGH MDM 45: CPT | Performed by: NURSE PRACTITIONER

## 2023-07-12 NOTE — PROGRESS NOTES
NURSING DISCHARGE NOTE    Discharged Nursing home via Ambulance. Accompanied by Support staff  Belongings Taken by patient/family.   [facility] P:[phone]    spoke to JOSH Coon,

## 2023-07-12 NOTE — TELEPHONE ENCOUNTER
Judy RN LVM requesting wound care orders. Attempted to return call at [phone] ext [phone], LVM pt has not been seen in wound clinic since 6/7/23. Pt recently admitted to hospital and had surgery to area, recommend to reach to surgeon's office. Currently is not scheduled at wound clinic at this time. Advised to return call if there are any questions or concerns.

## 2023-07-12 NOTE — PROGRESS NOTES
[employer] running late. Called [employer] ambulance, crew runing late for 20 minuted. Informed dispatcher that [facility] wants patient to be there before 8 pm.  [facility]  inform RN that crew should be able to pick her up before 8 pm.  Patient updated regarding transportation running late.

## 2023-07-13 VITALS
RESPIRATION RATE: 18 BRPM | DIASTOLIC BLOOD PRESSURE: 78 MMHG | SYSTOLIC BLOOD PRESSURE: 135 MMHG | OXYGEN SATURATION: 97 % | TEMPERATURE: 98 F | HEART RATE: 75 BPM

## 2023-07-13 RX ORDER — MINERAL OIL, PETROLATUM 425; 568 MG/G; MG/G
1 OINTMENT OPHTHALMIC 3 TIMES DAILY PRN
COMMUNITY

## 2023-07-13 RX ORDER — DOCUSATE SODIUM 100 MG/1
100 CAPSULE, LIQUID FILLED ORAL 2 TIMES DAILY
COMMUNITY

## 2023-07-13 NOTE — OPERATIVE REPORT
PREOPERATIVE DIAGNOSIS:  left lower extremity wound. POSTOPERATIVE DIAGNOSIS:  left lower extremity wound. PROCEDURE:  Irrigation and debridement of a 8 cm x 6 cm left knee wound, a medial gastrocnemius muscle pedicle flap for coverage of the knee wound, and split-thickness skin graft measuring 12 cm x 10cm . ASSISTANT:  Rudolph Nova CSA. ANESTHESIA:  General.     ESTIMATED BLOOD LOSS:  50 mL. COMPLICATIONS:  None apparent. DRAINS: 1 drain     INDICATION:  The patient is a very pleasant female who is known to me for historyof left knee wound. The patient was seen earlier in consultation with this left knee wound, with exposed tendon. I had a lengthy discussion with the patient's orthopedic surgeon, who did not feel that the patient had a knee infection and that the issue was the exposed tendon. We discussed risks and benefits of undergoing coverage. Risks discussed include, but are not limited to, bleeding, infection, need for additional procedures, injury to nearby structures such as artery, vein, and nerves. We discussed partial or total flap loss. We discussed partial and total skin graft loss. We discussed a need for additional procedures. The patient understood all this and informed consent was obtained. OPERATIVE:  The patient was appropriately identified in the preop area and marked. she was then taken to the operating room and placed on the operating room table in the supine fashion, and preoperative antibiotics were administered. SCD was placed on the left lower extremity. Following induction of general anesthesia, she was prepped and draped in the usual fashion. The left lower extremity had been prepped circumferentially. No tourniquet was used for this procedure. At this point, the wound was irrigated with antibiotic irrigation. No purulent drainage was identified during this.   At this point, I inspected the wound and made a decision to proceed with gastroc reconstruction. The edge the tibia was marked and incision was marked 2 cm medial to the posterior border of the tibia. A longitudinal incision was then made with a 10 blade. Bovie electrocautery was then used in order to develop the incision down and onto the fascia. The saphenous vein was not seen during the dissection. Bovie electrocautery was then used in order to enter the fascia. The avascular plane between the skin and the subcutaneous tissues above the gastroc was then created with the use of Bovie electrocautery until the raphe was identified. Then I developed an avascular plane between the medial head of the gastroc and the soleus muscle. Small vessels were ligated during this. The medial and lateral heads of the gastrocnemius were then identified and carefully  with the use of electrocautery. At this point, I transected a portion of the Achilles tendon, leaving approximately 2 cm length on the medial gastroc for ease of inset. I then elevated the muscle distally to proximally, taking great care not to injure the neurovascular pedicle. I then scored the fascial layer longitudinally to allow for further expansion of the muscle. At this point I turned my attention to the skin bridge between the previous wound and our incision. I used the Bovie in order to elevate and create a tunnel. Upon creation of an adequate tunnel, I attempted to pass the muscle underneath the tunnel. Due to scar tissue this was not possible. At this point, I made a decision to split the bridge in order to allow for inset. knife was used in order to make the skin incision, and Bovie electrocautery used to carry the rest of the dissection down. I then rotated the muscle into the new defect, and this appeared to rotate without undue tension  At this point, we inspected the wounds for hemostasis. Bovie electrocautery was used for hemostasis and wound irrigated.   I placed a 15-round channel drain underneath the muscle. We then inset the muscle into the defect with the use of 2-0 Vicryl buried sutures and an additional 2-0 Vicryl sutures. 15-round channel drain was placed at the donor site. This was secured with 3-0 nylon. The donor site incision was then closed with  3-0 Vicryl for the deep dermal closure, and 3-0 nylon was used in order to close the skin. Following this, I prepared to harvest a skin graft. A 3-inch wide blade was selected, which was appropriate for our recipient site. A 0.012 thickness was selected. Then harvested the skin graft measuring approximately 3 inches x 12 cm with the use of a dermatome. The skin graft was then laid down onto muscle with dermis down,  The muscle fascia was scored prior to application of the graft. This was then secured with 3-0 Fast sutures and stapled into place. The patient tolerated the procedure very well. Following this, we dressed the donor site with Tegaderm. The leg appeared to have excellent blood supply. The calf was soft. The wound was then dressed with Kerlix, Ace wrap, and placed in the knee immobilizer. Patient tolerated procedure well, was extubated and taken to recovery room. All counts were correct x2 at the end of the procedure.

## 2023-07-13 NOTE — CONSULTS
[address] [place]    PATIENT'S NAME: Ruth Sánchez   ATTENDING PHYSICIAN: Juvenal Isaac DO   CONSULTING PHYSICIAN: Haja Simon. Nguyen Hardwick M.D. PATIENT ACCOUNT#:   [de-identified]    [facility]  MEDICAL RECORD #:   [id number]       YOB: 1960  ADMISSION DATE:       07/06/2023      CONSULT DATE:  07/06/2023    REPORT OF CONSULTATION    HISTORY OF PRESENT ILLNESS:  This patient is 58years old. She had a left total knee replacement on April 10. She had a revision of gastrocnemius flap done on June 21. She was on PO treatment at rehab center. She returned with wound and leg redness and I am asked to evaluate. PAST MEDICAL HISTORY:  Please see the records with regards to the knee, neuropathy, rheumatoid arthritis. MEDICATIONS:  Reviewed in Epic. ALLERGIES:  Listed to tramadol, capsaicin cream.    SOCIAL HISTORY:  Negative cigarettes and alcohol. FAMILY HISTORY:  Nobody else ill. REVIEW OF SYSTEMS:  Range of motion in was limited, though she has been in a knee brace for most of the period of time and there has not been much in the way of physical therapy. PHYSICAL EXAMINATION:    GENERAL:  This is a well-developed woman in no acute distress. Please see my Epic note for the physical exam.    EXTREMITIES:  Here I will describe that the left knee gastrocnemius flap is discolored and swollen with some serous drainage. There is surrounding redness on the knee that goes distally through the leg. The leg has a good amount of edema also. No crepitus, fluctuance, or lymphangitis however is noted. Range of motion is limited. LABORATORY DATA:  Please see Epic for these. IMPRESSION:  A complication post surgical with a gastrocnemius flap over in April left total knee replacement. The previous culture had methicillin-sensitive Staphylococcus aureus, Streptococcus mitis and Prevotella. I have ordered a new culture.   I have chosen vancomycin plus Flagyl for treatment for now. I have asked the nurse to insert a PICC line and recommend a long IV course of treatment at a minimum. The lack of range of motion could be from the knee brace and lack of physical therapy, but underlying prosthesis involvement is certainly in the differential.  I spoke with the patient and nurse, further suggestions will follow. Thank you very much for allowing me to see this patient. Dictated By Kimmie Britt.  Rachel Peralta M.D.  d: 07/13/2023 10:37:53  t: 07/13/2023 11:10:43  [facility] [id number]  [facility]/

## 2023-07-14 ENCOUNTER — SNF VISIT (OUTPATIENT)
Dept: INTERNAL MEDICINE CLINIC | Age: 63
End: 2023-07-14

## 2023-07-14 DIAGNOSIS — R53.1 WEAKNESS: ICD-10-CM

## 2023-07-14 DIAGNOSIS — M25.562 LEFT KNEE PAIN, UNSPECIFIED CHRONICITY: Primary | ICD-10-CM

## 2023-07-14 DIAGNOSIS — Z98.890 S/P LEFT KNEE SURGERY: ICD-10-CM

## 2023-07-14 DIAGNOSIS — R53.81 PHYSICAL DECONDITIONING: ICD-10-CM

## 2023-07-14 DIAGNOSIS — M05.79 RHEUMATOID ARTHRITIS INVOLVING MULTIPLE SITES WITH POSITIVE RHEUMATOID FACTOR (HCC): ICD-10-CM

## 2023-07-14 DIAGNOSIS — L03.90 CELLULITIS, UNSPECIFIED CELLULITIS SITE: ICD-10-CM

## 2023-07-14 PROCEDURE — 3078F DIAST BP <80 MM HG: CPT | Performed by: NURSE PRACTITIONER

## 2023-07-14 PROCEDURE — 99307 SBSQ NF CARE SF MDM 10: CPT | Performed by: NURSE PRACTITIONER

## 2023-07-14 PROCEDURE — 3075F SYST BP GE 130 - 139MM HG: CPT | Performed by: NURSE PRACTITIONER

## 2023-07-15 VITALS
RESPIRATION RATE: 18 BRPM | OXYGEN SATURATION: 97 % | SYSTOLIC BLOOD PRESSURE: 130 MMHG | TEMPERATURE: 98 F | HEART RATE: 73 BPM | DIASTOLIC BLOOD PRESSURE: 67 MMHG

## 2023-07-19 ENCOUNTER — SNF VISIT (OUTPATIENT)
Dept: INTERNAL MEDICINE CLINIC | Age: 63
End: 2023-07-19

## 2023-07-19 DIAGNOSIS — R53.81 PHYSICAL DECONDITIONING: ICD-10-CM

## 2023-07-19 DIAGNOSIS — M25.562 LEFT KNEE PAIN, UNSPECIFIED CHRONICITY: Primary | ICD-10-CM

## 2023-07-19 DIAGNOSIS — M05.79 RHEUMATOID ARTHRITIS INVOLVING MULTIPLE SITES WITH POSITIVE RHEUMATOID FACTOR (HCC): ICD-10-CM

## 2023-07-19 DIAGNOSIS — R60.0 EDEMA OF LEFT LOWER LEG: ICD-10-CM

## 2023-07-19 DIAGNOSIS — R53.1 WEAKNESS: ICD-10-CM

## 2023-07-19 DIAGNOSIS — L03.90 CELLULITIS, UNSPECIFIED CELLULITIS SITE: ICD-10-CM

## 2023-07-19 DIAGNOSIS — Z98.890 S/P LEFT KNEE SURGERY: ICD-10-CM

## 2023-07-19 PROCEDURE — 99307 SBSQ NF CARE SF MDM 10: CPT | Performed by: NURSE PRACTITIONER

## 2023-07-19 PROCEDURE — 3074F SYST BP LT 130 MM HG: CPT | Performed by: NURSE PRACTITIONER

## 2023-07-19 PROCEDURE — 3079F DIAST BP 80-89 MM HG: CPT | Performed by: NURSE PRACTITIONER

## 2023-07-20 VITALS
DIASTOLIC BLOOD PRESSURE: 80 MMHG | RESPIRATION RATE: 18 BRPM | TEMPERATURE: 98 F | HEART RATE: 70 BPM | SYSTOLIC BLOOD PRESSURE: 126 MMHG | OXYGEN SATURATION: 95 %

## 2023-07-21 ENCOUNTER — SNF DISCHARGE (OUTPATIENT)
Dept: INTERNAL MEDICINE CLINIC | Age: 63
End: 2023-07-21

## 2023-07-21 VITALS
HEART RATE: 81 BPM | RESPIRATION RATE: 18 BRPM | DIASTOLIC BLOOD PRESSURE: 80 MMHG | OXYGEN SATURATION: 94 % | SYSTOLIC BLOOD PRESSURE: 131 MMHG | TEMPERATURE: 98 F

## 2023-07-21 DIAGNOSIS — M25.562 LEFT KNEE PAIN, UNSPECIFIED CHRONICITY: Primary | ICD-10-CM

## 2023-07-21 DIAGNOSIS — M05.79 RHEUMATOID ARTHRITIS INVOLVING MULTIPLE SITES WITH POSITIVE RHEUMATOID FACTOR (HCC): ICD-10-CM

## 2023-07-21 DIAGNOSIS — L03.90 CELLULITIS, UNSPECIFIED CELLULITIS SITE: ICD-10-CM

## 2023-07-21 DIAGNOSIS — Z98.890 S/P LEFT KNEE SURGERY: ICD-10-CM

## 2023-07-21 PROCEDURE — 3075F SYST BP GE 130 - 139MM HG: CPT | Performed by: NURSE PRACTITIONER

## 2023-07-21 PROCEDURE — 3079F DIAST BP 80-89 MM HG: CPT | Performed by: NURSE PRACTITIONER

## 2023-07-21 PROCEDURE — 99316 NF DSCHRG MGMT 30 MIN+: CPT | Performed by: NURSE PRACTITIONER

## 2023-08-04 ENCOUNTER — HOSPITAL ENCOUNTER (INPATIENT)
Facility: HOSPITAL | Age: 63
LOS: 4 days | Discharge: HOME | End: 2023-08-08
Attending: EMERGENCY MEDICINE | Admitting: HOSPITALIST
Payer: COMMERCIAL

## 2023-08-04 ENCOUNTER — APPOINTMENT (OUTPATIENT)
Dept: ULTRASOUND IMAGING | Facility: HOSPITAL | Age: 63
End: 2023-08-04
Attending: EMERGENCY MEDICINE
Payer: COMMERCIAL

## 2023-08-04 DIAGNOSIS — L02.416 ABSCESS OF LEFT LEG: Primary | ICD-10-CM

## 2023-08-04 LAB
ALBUMIN SERPL-MCNC: 3.6 G/DL (ref 3.4–5)
ALBUMIN/GLOB SERPL: 0.9 {RATIO} (ref 1–2)
ALP LIVER SERPL-CCNC: 63 U/L
ALT SERPL-CCNC: 36 U/L
ANION GAP SERPL CALC-SCNC: 4 MMOL/L (ref 0–18)
APTT PPP: 30.6 SECONDS (ref 23.3–35.6)
AST SERPL-CCNC: 32 U/L (ref 15–37)
BASOPHILS # BLD AUTO: 0.05 X10(3) UL (ref 0–0.2)
BASOPHILS NFR BLD AUTO: 0.7 %
BILIRUB SERPL-MCNC: 0.2 MG/DL (ref 0.1–2)
BUN BLD-MCNC: 13 MG/DL (ref 7–18)
CALCIUM BLD-MCNC: 9.2 MG/DL (ref 8.5–10.1)
CHLORIDE SERPL-SCNC: 103 MMOL/L (ref 98–112)
CO2 SERPL-SCNC: 27 MMOL/L (ref 21–32)
CREAT BLD-MCNC: 0.66 MG/DL
EGFRCR SERPLBLD CKD-EPI 2021: 98 ML/MIN/1.73M2 (ref 60–?)
EOSINOPHIL # BLD AUTO: 0.18 X10(3) UL (ref 0–0.7)
EOSINOPHIL NFR BLD AUTO: 2.4 %
ERYTHROCYTE [DISTWIDTH] IN BLOOD BY AUTOMATED COUNT: 15.7 %
GLOBULIN PLAS-MCNC: 3.9 G/DL (ref 2.8–4.4)
GLUCOSE BLD-MCNC: 97 MG/DL (ref 70–99)
HCT VFR BLD AUTO: 32.7 %
HGB BLD-MCNC: 10.3 G/DL
IMM GRANULOCYTES # BLD AUTO: 0.02 X10(3) UL (ref 0–1)
IMM GRANULOCYTES NFR BLD: 0.3 %
INR BLD: 1.05 (ref 0.85–1.16)
LACTATE SERPL-SCNC: 1 MMOL/L (ref 0.4–2)
LYMPHOCYTES # BLD AUTO: 1.32 X10(3) UL (ref 1–4)
LYMPHOCYTES NFR BLD AUTO: 17.7 %
MCH RBC QN AUTO: 27.8 PG (ref 26–34)
MCHC RBC AUTO-ENTMCNC: 31.5 G/DL (ref 31–37)
MCV RBC AUTO: 88.1 FL
MONOCYTES # BLD AUTO: 0.82 X10(3) UL (ref 0.1–1)
MONOCYTES NFR BLD AUTO: 11 %
NEUTROPHILS # BLD AUTO: 5.08 X10 (3) UL (ref 1.5–7.7)
NEUTROPHILS # BLD AUTO: 5.08 X10(3) UL (ref 1.5–7.7)
NEUTROPHILS NFR BLD AUTO: 67.9 %
OSMOLALITY SERPL CALC.SUM OF ELEC: 278 MOSM/KG (ref 275–295)
PLATELET # BLD AUTO: 362 10(3)UL (ref 150–450)
POTASSIUM SERPL-SCNC: 4 MMOL/L (ref 3.5–5.1)
PROT SERPL-MCNC: 7.5 G/DL (ref 6.4–8.2)
PROTHROMBIN TIME: 13.7 SECONDS (ref 11.6–14.8)
RBC # BLD AUTO: 3.71 X10(6)UL
SODIUM SERPL-SCNC: 134 MMOL/L (ref 136–145)
WBC # BLD AUTO: 7.5 X10(3) UL (ref 4–11)

## 2023-08-04 PROCEDURE — 80053 COMPREHEN METABOLIC PANEL: CPT | Performed by: EMERGENCY MEDICINE

## 2023-08-04 PROCEDURE — 0Y9J30Z DRAINAGE OF LEFT LOWER LEG WITH DRAINAGE DEVICE, PERCUTANEOUS APPROACH: ICD-10-PCS | Performed by: RADIOLOGY

## 2023-08-04 PROCEDURE — 85025 COMPLETE CBC W/AUTO DIFF WBC: CPT

## 2023-08-04 PROCEDURE — 80053 COMPREHEN METABOLIC PANEL: CPT

## 2023-08-04 PROCEDURE — 83605 ASSAY OF LACTIC ACID: CPT | Performed by: EMERGENCY MEDICINE

## 2023-08-04 PROCEDURE — 87040 BLOOD CULTURE FOR BACTERIA: CPT | Performed by: EMERGENCY MEDICINE

## 2023-08-04 PROCEDURE — 93971 EXTREMITY STUDY: CPT | Performed by: EMERGENCY MEDICINE

## 2023-08-04 PROCEDURE — 85730 THROMBOPLASTIN TIME PARTIAL: CPT | Performed by: EMERGENCY MEDICINE

## 2023-08-04 PROCEDURE — 85610 PROTHROMBIN TIME: CPT | Performed by: EMERGENCY MEDICINE

## 2023-08-04 PROCEDURE — 85025 COMPLETE CBC W/AUTO DIFF WBC: CPT | Performed by: EMERGENCY MEDICINE

## 2023-08-04 RX ORDER — CETIRIZINE HYDROCHLORIDE 10 MG/1
10 TABLET ORAL DAILY
Status: DISCONTINUED | OUTPATIENT
Start: 2023-08-04 | End: 2023-08-08

## 2023-08-04 RX ORDER — ATORVASTATIN CALCIUM 20 MG/1
20 TABLET, FILM COATED ORAL NIGHTLY
Status: DISCONTINUED | OUTPATIENT
Start: 2023-08-04 | End: 2023-08-08

## 2023-08-04 RX ORDER — CYCLOBENZAPRINE HCL 5 MG
5 TABLET ORAL 3 TIMES DAILY PRN
Status: DISCONTINUED | OUTPATIENT
Start: 2023-08-04 | End: 2023-08-08

## 2023-08-04 RX ORDER — ENOXAPARIN SODIUM 100 MG/ML
40 INJECTION SUBCUTANEOUS DAILY
Status: DISCONTINUED | OUTPATIENT
Start: 2023-08-04 | End: 2023-08-04 | Stop reason: SDUPTHER

## 2023-08-04 RX ORDER — GABAPENTIN 400 MG/1
400 CAPSULE ORAL 3 TIMES DAILY
Status: DISCONTINUED | OUTPATIENT
Start: 2023-08-04 | End: 2023-08-08

## 2023-08-04 RX ORDER — POLYETHYLENE GLYCOL 3350 17 G/17G
17 POWDER, FOR SOLUTION ORAL DAILY PRN
Status: DISCONTINUED | OUTPATIENT
Start: 2023-08-04 | End: 2023-08-08

## 2023-08-04 RX ORDER — ENOXAPARIN SODIUM 100 MG/ML
40 INJECTION SUBCUTANEOUS NIGHTLY
Status: DISCONTINUED | OUTPATIENT
Start: 2023-08-05 | End: 2023-08-08

## 2023-08-04 RX ORDER — FAMOTIDINE 20 MG/1
20 TABLET, FILM COATED ORAL 2 TIMES DAILY
Status: DISCONTINUED | OUTPATIENT
Start: 2023-08-04 | End: 2023-08-08

## 2023-08-04 RX ORDER — METRONIDAZOLE 500 MG/1
500 TABLET ORAL EVERY 8 HOURS SCHEDULED
Status: DISCONTINUED | OUTPATIENT
Start: 2023-08-04 | End: 2023-08-08

## 2023-08-04 RX ORDER — ONDANSETRON 2 MG/ML
4 INJECTION INTRAMUSCULAR; INTRAVENOUS EVERY 6 HOURS PRN
Status: DISCONTINUED | OUTPATIENT
Start: 2023-08-04 | End: 2023-08-08

## 2023-08-04 RX ORDER — DOCUSATE SODIUM 100 MG/1
100 CAPSULE, LIQUID FILLED ORAL 2 TIMES DAILY
Status: DISCONTINUED | OUTPATIENT
Start: 2023-08-04 | End: 2023-08-08

## 2023-08-04 RX ORDER — HYDROXYCHLOROQUINE SULFATE 200 MG/1
200 TABLET, FILM COATED ORAL 2 TIMES DAILY
Status: DISCONTINUED | OUTPATIENT
Start: 2023-08-04 | End: 2023-08-08

## 2023-08-04 RX ORDER — ACETAMINOPHEN 325 MG/1
650 TABLET ORAL EVERY 6 HOURS PRN
Status: DISCONTINUED | OUTPATIENT
Start: 2023-08-04 | End: 2023-08-08

## 2023-08-04 NOTE — ED INITIAL ASSESSMENT (HPI)
Left Knee replacement 4/10 - wound wasn't heeling right. May 21st wound care opened her up and had wound debridement and wound vac placement. June 21st had surgery for wound graft and muscle flap. States since then she has been on IV antibiotics with right PICC line. States she was sent to R/O cellulitis/DVT by ID.

## 2023-08-04 NOTE — CONSULTS
Infectious Disease Initial Consultation      Date of admission: 8/4/2023  1:10 PM     Date of service: 08/04/23 4:24 PM    Consult requested by: Cheryl Jones MD     Reason for consult: Left leg cellulitis    Chief complaint: Left leg redness    History of present illness: Willie Mosqueda is a 58year old female with history of rheumatoid arthritis, used to be on Biologics until end of May, underwent a left total knee arthroplasty on 4/20. Course was complicated by poor wound healing necessitating debridement of her wound. Initially she had the first debridement on 5/22 and at that point it was felt that it was not infected. She had a wound VAC placed; however, her wound failed to heal so eventually was brought into the hospital for admission. Synovial tap from that prosthetic knee did not show any evidence of infection. Drainage cultures from her wound at that point had grown MSSA, strep mitis and Prevotella. Was taken to the OR on 6/13, infection was noted to be superficial without articular involvement. She underwent debridement with a muscle flap eventually. She was treated with IV cefazolin and p.o. metronidazole as an inpatient, and discharged on IV vancomycin and metronidazole. Her vancomycin was later on switched to daptomycin when she was discharged home for ease of use. She continues to be on both antibiotics at this point. Over the last week or so, she started noting worsening redness of her leg. Initially, the redness was on and off but then became more persistent, her leg became hot. It was also more swollen. There were no fevers or chills, no other constitutional symptoms. In the emergency room, she was afebrile, hemodynamically stable. Labs revealed mild anemia but otherwise unremarkable CBC. BMP was unremarkable. LFTs unremarkable. 2 sets of blood cultures were obtained, remain negative to date.   An ultrasound of the left leg showed no DVT; however, there was a large 14.3 x 1.2 x 5.9 cm fluid collection with septation, hematoma versus abscess. There was a more simple appearing collection in the medial left knee measuring 3.5 x 0.8 x 2.9 cm. ID were consulted for antibiotic recommendations. Risk factors/Exposures:  Living situation: At home with family  Sick contacts: None  Animals: No pets or other animal exposure  Travel: No recent local/international travel  /[facility]/assisted: None  Outdoor activities: Nothing unusual  Active TB exposure: None  IV drug use: None    Review of systems:  All other components of the review of systems are negative, except those described in the history of present illness. Past Medical History:   Diagnosis Date    Back problem     Benign essential hypertension 2010    High cholesterol     History of COVID-19 2022    fever, congestion- several days, fatigue x 14 days. Sx's resolved. Not hospitalized. Neuropathy     Omid hands and feet    Rheumatoid arthritis (HCC)     Tobacco abuse 2010    Visual impairment     glasses     Past Surgical History:   Procedure Laterality Date          x2    OTHER SURGICAL HISTORY      cervical 4- 5 spine fusion    OTHER SURGICAL HISTORY Right 2022    right shoulder replacement    SPINE SURGERY PROCEDURE UNLISTED  10/26/2021    lumbar 4-5 TLIF    STABISMUS SURG,SUPER-OBLIQ MUSC  Age 1     Social History     Socioeconomic History    Marital status:     Number of children: 2   Occupational History    Occupation:    Tobacco Use    Smoking status: Former     Packs/day: 0.50     Years: 20.00     Pack years: 10.00     Types: Cigarettes     Quit date: 2021     Years since quittin.9    Smokeless tobacco: Never   Vaping Use    Vaping Use: Never used   Substance and Sexual Activity    Alcohol use: Not Currently     Comment: rare    Drug use: No    Sexual activity: Yes     Partners: Male   Social History Narrative    . Works as an .  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 Smokes about 2 cigarettes per day. Social alcohol use. No illicits. No structured exercise. 2 children. Family History   Problem Relation Age of Onset    Musculo-skelatal Disorder Daughter     Asthma Daughter     Allergies Daughter     Obesity Son     Diabetes Maternal Grandmother         type 2 diabetes    Other (cva) Maternal Grandmother     Diabetes Paternal Grandmother         type 2 diabetes    Psychiatric Sister         anxiety    Genito-Urinary Disorder Father         BPH    Cancer Father         basal cell carcinoma    Heart Disorder Father         a fib     Thyroid Disorder Mother         goiter, thyroid surgery, now on Synthroid    Breast Cancer Maternal Aunt 68        62's   Reviewed, see above    Medications:    cefepime    vancomycin     Allergies:    Tramadol                OTHER (SEE COMMENTS)    Comment:\"I felt goofy and all my muscles were levi\"  Other                   RASH    Comment:CAPSAICIN CREAM    Physical Exam:   08/04/23  1600   BP: 151/79   Pulse: 70   Resp: 16   Temp:      Vitals signs and nursing note reviewed. Constitutional:       Appearance: Normal appearance. HENT:      Head: Normocephalic and atraumatic. Mouth: Mucous membranes are moist.   Neck:      Musculoskeletal: Neck supple. Cardiovascular:      Rate and Rhythm: Normal rate. Heart sounds: Normal heart sounds. No murmur. No friction rub. No gallop. Pulmonary:      Effort: Pulmonary effort is normal. No respiratory distress. Breath sounds: Normal breath sounds. No stridor. No wheezing, rhonchi or rales. Chest:      Chest wall: No tenderness. Abdominal:      General: Abdomen is flat. There is no distension. Palpations: Abdomen is soft. There is no mass. Tenderness: There is no tenderness. There is no guarding or rebound. Hernia: No hernia is present. Musculoskeletal:      Right lower leg: No edema. Left lower leg: +2 edema.   Cellulitis noted involving her whole leg, with more focal swelling on the medial aspect of the leg with mild fluctuation. The patient has severe peripheral neuropathy, unable to feel pain. See pictures below  Skin:     General: Skin is warm and dry. Left leg cellulitis. See pictures below  Neurological:      General: No focal deficit present. Mental Status: Alert and oriented to person, place, and time. Laboratory data:  I have independently reviewed all lab results; including old microbiological results. Lab Results   Component Value Date    WBC 7.5 08/04/2023    HGB 10.3 08/04/2023    HCT 32.7 08/04/2023    .0 08/04/2023    CREATSERUM 0.66 08/04/2023    BUN 13 08/04/2023     08/04/2023    K 4.0 08/04/2023     08/04/2023    CO2 27.0 08/04/2023    GLU 97 08/04/2023    CA 9.2 08/04/2023    ALB 3.6 08/04/2023    ALKPHO 63 08/04/2023    BILT 0.2 08/04/2023    TP 7.5 08/04/2023    AST 32 08/04/2023    ALT 36 08/04/2023        Recent Labs   Lab 08/04/23  1226   RBC 3.71*   HGB 10.3*   HCT 32.7*   MCV 88.1   MCH 27.8   MCHC 31.5   RDW 15.7   NEPRELIM 5.08   WBC 7.5   .0       Microbiology data:  No results found for this visit on 08/04/23. Radiology:  I have reviewed all imaging data available independently. Duplex of the left lower extremity:  No DVTs. Large complex fluid collection on the medial left calf, concerning for an abscess.     Impression:  Alma Delia Blas is a 58year old female with     Left lower extremity cellulitis with a large complex fluid collection, concerning for an abscess  This is in the setting of a recent total knee arthroplasty on that same side on 6/78  Course complicated by a nonhealing wound with superficial infection, with cultures then growing MSSA, strep mitis and Prevotella  Was on daptomycin and Flagyl through today  Ultrasound is concerning for an abscess  Left knee surgical wound infection in the setting of a recent total knee arthroplasty on 4/10/2023  Synovial aspirate obtained on 6/12 is not consistent with an infection  Synovial cultures with no growth to date  Status post debridement on 6/13, infection noted to be superficial without involvement of the knee joint, with cultures growing MSSA, strep mitis and corynebacterium and Prevotella  Currently on IV daptomycin and p.o. metronidazole  Status post muscle flap with plastic surgery on 6/21  History of rheumatoid arthritis  Biologics on hold    Recommendations:     Continue to follow-up on blood culture data  Consult plastic surgery for possible I&D of the complex fluid collection due to concerns for abscess  Start IV cefepime and IV vancomycin  Continue p.o. metronidazole  Discontinue daptomycin  Further recommendations will depend on clinical progress    The plan of care was discussed with the primary hospital team, Joselyn Lennox, MD     Recommendations were also discussed with the patient; all questions were answered. Thank you for this consultation. Please don't hesitate to call the ID team for questions or any acute changes in patient's clinical condition. Please note that this report has been produced using speech recognition software and may contain errors related to that system including, but not limited to, errors in grammar, punctuation, and spelling, as well as words and phrases that possibly may have been recognized inappropriately. If there are any questions or concerns, contact the dictating provider for clarification. The [facility] makes medical notes like these available to patients in the interest of transparency. Please be advised this is a medical document. Medical documents are intended to carry relevant information, facts as evident, and the clinical opinion of the practitioner. The medical note is intended as peer to peer communication and may appear blunt or direct. It is written in medical language and may contain abbreviations or verbiage that are unfamiliar. MD PRANAY Marie Infectious Disease. Tel: [phone]. Fax: [phone].      Jackie Bynum : 1960 MRN: [id number] CSN: [id number]

## 2023-08-04 NOTE — ED QUICK NOTES
Orders for admission, patient is aware of plan and ready to go upstairs.  Any questions, please call ED RN bronson  at extension [phone]    Patient Covid vaccination status: Fully vaccinated     COVID Test Ordered in ED: None    COVID Suspicion at Admission: N/A    Running Infusions:  I/v antibiotic     Mental Status/LOC at time of transport: oriented x4    Other pertinent information:  patient has picc line in right arm  CIWA score: N/A   NIH score:  N/A

## 2023-08-04 NOTE — ED QUICK NOTES
Patient seen by Infectious disease doctor . For I/v antibiotic and admission  awaiting for    Admission.

## 2023-08-05 ENCOUNTER — APPOINTMENT (OUTPATIENT)
Dept: CT IMAGING | Facility: HOSPITAL | Age: 63
End: 2023-08-05
Attending: INTERNAL MEDICINE
Payer: COMMERCIAL

## 2023-08-05 ENCOUNTER — APPOINTMENT (OUTPATIENT)
Dept: ULTRASOUND IMAGING | Facility: HOSPITAL | Age: 63
End: 2023-08-05
Attending: EMERGENCY MEDICINE
Payer: COMMERCIAL

## 2023-08-05 LAB
ANION GAP SERPL CALC-SCNC: 2 MMOL/L (ref 0–18)
BASOPHILS # BLD AUTO: 0.06 X10(3) UL (ref 0–0.2)
BASOPHILS NFR BLD AUTO: 1.3 %
BUN BLD-MCNC: 9 MG/DL (ref 7–18)
CALCIUM BLD-MCNC: 9.2 MG/DL (ref 8.5–10.1)
CHLORIDE SERPL-SCNC: 105 MMOL/L (ref 98–112)
CO2 SERPL-SCNC: 30 MMOL/L (ref 21–32)
CREAT BLD-MCNC: 0.53 MG/DL
CREAT BLD-MCNC: 0.53 MG/DL
EGFRCR SERPLBLD CKD-EPI 2021: 103 ML/MIN/1.73M2 (ref 60–?)
EGFRCR SERPLBLD CKD-EPI 2021: 103 ML/MIN/1.73M2 (ref 60–?)
EOSINOPHIL # BLD AUTO: 0.33 X10(3) UL (ref 0–0.7)
EOSINOPHIL NFR BLD AUTO: 6.9 %
ERYTHROCYTE [DISTWIDTH] IN BLOOD BY AUTOMATED COUNT: 15.9 %
GLUCOSE BLD-MCNC: 99 MG/DL (ref 70–99)
HCT VFR BLD AUTO: 30.3 %
HGB BLD-MCNC: 9.8 G/DL
IMM GRANULOCYTES # BLD AUTO: 0.01 X10(3) UL (ref 0–1)
IMM GRANULOCYTES NFR BLD: 0.2 %
LYMPHOCYTES # BLD AUTO: 1 X10(3) UL (ref 1–4)
LYMPHOCYTES NFR BLD AUTO: 21 %
MCH RBC QN AUTO: 27.8 PG (ref 26–34)
MCHC RBC AUTO-ENTMCNC: 32.3 G/DL (ref 31–37)
MCV RBC AUTO: 85.8 FL
MONOCYTES # BLD AUTO: 0.67 X10(3) UL (ref 0.1–1)
MONOCYTES NFR BLD AUTO: 14.1 %
NEUTROPHILS # BLD AUTO: 2.69 X10 (3) UL (ref 1.5–7.7)
NEUTROPHILS # BLD AUTO: 2.69 X10(3) UL (ref 1.5–7.7)
NEUTROPHILS NFR BLD AUTO: 56.5 %
OSMOLALITY SERPL CALC.SUM OF ELEC: 283 MOSM/KG (ref 275–295)
PLATELET # BLD AUTO: 307 10(3)UL (ref 150–450)
POTASSIUM SERPL-SCNC: 4.1 MMOL/L (ref 3.5–5.1)
RBC # BLD AUTO: 3.53 X10(6)UL
SODIUM SERPL-SCNC: 137 MMOL/L (ref 136–145)
WBC # BLD AUTO: 4.8 X10(3) UL (ref 4–11)

## 2023-08-05 PROCEDURE — 87116 MYCOBACTERIA CULTURE: CPT | Performed by: EMERGENCY MEDICINE

## 2023-08-05 PROCEDURE — 87205 SMEAR GRAM STAIN: CPT | Performed by: EMERGENCY MEDICINE

## 2023-08-05 PROCEDURE — 87070 CULTURE OTHR SPECIMN AEROBIC: CPT | Performed by: EMERGENCY MEDICINE

## 2023-08-05 PROCEDURE — 87206 SMEAR FLUORESCENT/ACID STAI: CPT | Performed by: EMERGENCY MEDICINE

## 2023-08-05 PROCEDURE — 82565 ASSAY OF CREATININE: CPT

## 2023-08-05 PROCEDURE — 87102 FUNGUS ISOLATION CULTURE: CPT | Performed by: EMERGENCY MEDICINE

## 2023-08-05 PROCEDURE — 85025 COMPLETE CBC W/AUTO DIFF WBC: CPT | Performed by: INTERNAL MEDICINE

## 2023-08-05 PROCEDURE — 88108 CYTOPATH CONCENTRATE TECH: CPT | Performed by: EMERGENCY MEDICINE

## 2023-08-05 PROCEDURE — 80048 BASIC METABOLIC PNL TOTAL CA: CPT | Performed by: INTERNAL MEDICINE

## 2023-08-05 PROCEDURE — 10030 IMG GID FLU COLL DRG SFT TIS: CPT | Performed by: EMERGENCY MEDICINE

## 2023-08-05 PROCEDURE — 73701 CT LOWER EXTREMITY W/DYE: CPT | Performed by: INTERNAL MEDICINE

## 2023-08-05 NOTE — PLAN OF CARE
Patient A&Ox4, VSS on RA, . Patient denies discomfort at this time. LLE red and warm to touch. Skin tender to touch on medial aspect of inner thigh. Skin graft to right thigh red and intact. Dressing reinforced. RUE PICC C/D/I; flushed and blood return noted. Orders for stat US drainage released. Paged MD regarding diet orders. Patient to be NPO. Spoke with plastics for recommendation. Ok to proceed with US drainage. Paged US to confirm time, US unable to be completed. RN to call at 0730 tomorrow to confirm time availability. Patient to be NPO at MN. Plan of care reviewed with patient. Patient demonstrates understanding.

## 2023-08-05 NOTE — PROCEDURES
[facility]  Procedure Note    [facility] Patient Status:  Inpatient    1960 MRN [id number]   [facility] 3SW-A Attending Yuly Pino, DO   Hosp Day # 1 PCP Corona Daigle MD     Procedure: US guided drain placement LLE    Pre-Procedure Diagnosis:  Fluid collection    Post-Procedure Diagnosis: Same    Anesthesia:  Local    Findings:  10f APD placed to LLE fluid collection    Specimens: 20 ml serous    Blood Loss:  Minimal    Tourniquet Time: none  Complications:  None  Drains:  As above    Secondary Diagnosis:  None    Feliciano Lazo MD  2023

## 2023-08-05 NOTE — PLAN OF CARE
Alert and oriented x 4. Vitals stable on room air. Redness and warmth noted to the LLE. Dressings clean, dry, intact. NATALIA drain to the LLE intact and functioning. . Voiding without difficulty. Last BM 08/05. Tolerating regular diet. SCD's on RLE. IV and PO antibiotics administered per MAR. Safety precautions in place.

## 2023-08-05 NOTE — PLAN OF CARE
A&Ox4 vital signs stable on RA. LLE redness, and warmth to touch noted. No c/o pain. Ambulating well with stand by assist. Voiding via purewick. Tolerating abx. RUE PICC C/D/I. Plan for US drainage 8/5. NPO @ midnight. POC discussed, pt verbalized understanding. No further questions at this time. Call light within reach.

## 2023-08-06 LAB
CREAT BLD-MCNC: 0.54 MG/DL
EGFRCR SERPLBLD CKD-EPI 2021: 103 ML/MIN/1.73M2 (ref 60–?)

## 2023-08-06 PROCEDURE — 82565 ASSAY OF CREATININE: CPT

## 2023-08-06 RX ORDER — CELECOXIB 200 MG/1
200 CAPSULE ORAL 2 TIMES DAILY
Status: DISCONTINUED | OUTPATIENT
Start: 2023-08-06 | End: 2023-08-08

## 2023-08-06 RX ORDER — CELECOXIB 200 MG/1
200 CAPSULE ORAL 2 TIMES DAILY
COMMUNITY

## 2023-08-06 NOTE — PLAN OF CARE
A&Ox4 vital signs stable on RA. US drainage completed today of LLE abscess. Wound site C/D/I. NATALIA drain intact and draining moderate amount. Mild c/o pain, managed with PRN medication. Pt tolerating IV abx. Ambulating well with stand by assist and front wheel walker. POC discussed, pt verbalized understanding. No further questions at this time. Call light within reach.

## 2023-08-06 NOTE — PLAN OF CARE
Alert and oriented x 4. Vitals stable on room air. Pain managed on PO medications. Dressings clean, dry, intact. NATALIA drain to the LLE intact and functioning. Last BM 08/06. Tolerating regular diet. Tolerating antibiotics. SCD's on RLE. Safety precautions in place.

## 2023-08-07 LAB
CREAT BLD-MCNC: 0.56 MG/DL
EGFRCR SERPLBLD CKD-EPI 2021: 102 ML/MIN/1.73M2 (ref 60–?)
VANCOMYCIN PEAK SERPL-MCNC: 27.8 UG/ML (ref 30–50)
VANCOMYCIN TROUGH SERPL-MCNC: 12.7 UG/ML (ref 10–20)

## 2023-08-07 PROCEDURE — 80202 ASSAY OF VANCOMYCIN: CPT | Performed by: INTERNAL MEDICINE

## 2023-08-07 PROCEDURE — 36593 DECLOT VASCULAR DEVICE: CPT

## 2023-08-07 PROCEDURE — 82565 ASSAY OF CREATININE: CPT

## 2023-08-07 NOTE — PLAN OF CARE
Aox4, NATALIA drain to Left knee functioning with no issues, Right PICC line functioning, on abx as ordered, cxs pending, on 2L PRN , scd to RLE, voiding in bathroom, up standby with RW, plan to dc with ABX.

## 2023-08-07 NOTE — CM/SW NOTE
08/07/23 1100   CM/SW Referral Data   Referral Source Social Work (self-referral)   Reason for Referral Discharge planning   Informant Patient;EMR;Clinical Staff Member   Patient Info   Patient's Current Mental Status at Time of Assessment Alert;Oriented   Patient lives with Spouse/Significant other   Patient Status Prior to Admission   Independent with ADLs and Mobility Yes   Services in place prior to admission DME/Supplies at home; Infusion   Type of DME/Supplies Wheeled Walker   Infusion Provider [facility] outpatient infusion [place]   Discharge Needs   Anticipated D/C needs Infusion care         Met with pt who is familiar to me from multiple previous hospital admissions. Pt with history of L TKR, revision with muscle flap by plastics now admitted for LE cellulitis. Spoke with Dr Promise Ramires who stated pt has been on daily dapto prior to admission and anticipated to need continued IV abx at OR. Met with pt who stated that she was discharged from [address] [place] [address] on 7/24. She has been going to the [facility] infusion center in [place] for daily IV abx. Pt has not been homebound and so was not eligible for [facility] or home infusion. Pt stated outpt infusion has been covered by her insurance plan. Discussed DC planning and pt confirmed she would like to continue a [place] infusion center for IV abx. No other DC needs/concerns identified at this time. Pt is s/p NATALIA drain placement with IR. Pt stated she feels able to manage the drain independently at home if she does need to discharge with it. Discussed that new insurance approval for outpt infusion will likely be needed. Await ID recommendations/script in order to arrange continued outpatient IV abx at OR. / to remain available for support and/or discharge planning.      Angelito Gilman [facility]  Discharge Planner  [phone]

## 2023-08-08 VITALS
OXYGEN SATURATION: 98 % | HEART RATE: 61 BPM | BODY MASS INDEX: 29.83 KG/M2 | RESPIRATION RATE: 18 BRPM | DIASTOLIC BLOOD PRESSURE: 64 MMHG | SYSTOLIC BLOOD PRESSURE: 131 MMHG | HEIGHT: 61 IN | WEIGHT: 158 LBS | TEMPERATURE: 98 F

## 2023-08-08 LAB
CREAT BLD-MCNC: 0.56 MG/DL
EGFRCR SERPLBLD CKD-EPI 2021: 102 ML/MIN/1.73M2 (ref 60–?)

## 2023-08-08 PROCEDURE — 82565 ASSAY OF CREATININE: CPT

## 2023-08-08 RX ORDER — ERTAPENEM 1 G/1
1 INJECTION, POWDER, LYOPHILIZED, FOR SOLUTION INTRAMUSCULAR; INTRAVENOUS DAILY
Qty: 21 EACH | Refills: 0 | Status: SHIPPED | OUTPATIENT
Start: 2023-08-08 | End: 2023-08-29

## 2023-08-08 RX ORDER — CYCLOBENZAPRINE HCL 5 MG
5 TABLET ORAL 3 TIMES DAILY PRN
Refills: 0 | Status: SHIPPED | COMMUNITY
Start: 2023-08-08

## 2023-08-08 RX ORDER — DAPTOMYCIN 50 MG/ML
6 INJECTION, POWDER, LYOPHILIZED, FOR SOLUTION INTRAVENOUS EVERY 24 HOURS
Qty: 21 EACH | Refills: 0 | Status: SHIPPED | OUTPATIENT
Start: 2023-08-08 | End: 2023-08-29

## 2023-08-08 NOTE — PLAN OF CARE
Patient is alert and oriented x4. Room air. O2 via nasal cannula if needed. SCD on right lower extremity. Lovenox for anticoagulation. Last bowel movement on 8/6. Dressing change completed per orders. NATALIA drain management. PICC line on right upper extremity. Glasses at bedside. Cultures pending. Plan is to discharge home when medically stable.

## 2023-08-08 NOTE — PROGRESS NOTES
AVS reviewed, explained NATALIA drain instructions , IV abx to be given at [facility] -has an appt at 330pm tomorrow , Picc line flushed, updated Wal[facility] Rubinstein to find out when NATALIA drain to be removed by IR.

## 2023-08-08 NOTE — PLAN OF CARE
Patient A&O X4 on RA- 2L O2 PRN. VSS, /IS. SCD to RLE, Lovenox. Voiding freely via purewick at night, LBM 8/7. RUE PICC- dressing changed, flushing but unable to get blood return. On IV Cefepime Q8h, IV Vanco Q12h, and PO flagyl. Healing surgical incision to left knee. NATALIA drain in place to right knee. Redness/dryness to RLE. Hx neuropathy. Denies pain at this time. WBAT. PT/OT. Up min assist w/ walker. Reminded to use call light. Plan to dc home w/ IV antibiotics when cleared. 2300: MD paged for order for Cath-jose a. Instilled per orders.

## 2023-08-08 NOTE — CM/SW NOTE
Received scripts for IV dapto and invanz daily x 21 days. ID APN also completed [facility] antibiotic infusion order form. [facility] [phone]. Contacted [facility] and spoke with Cassidy Zurita who confirmed fax was received. They will be scheduling appointments to start tomorrow and will call with schedule. SW to update pt at bedside. / to remain available for support and/or discharge planning. Abby Kwok [facility]  Discharge Planner  [phone]    Addendum:  Received confirmation from [address] [place] [address] that pt is scheduled for outpatient IV abx at their [address] [place] [address] location for tomorrow 8/9 at 330pm.  Updated RN and pt.

## 2023-08-08 NOTE — PLAN OF CARE
A&Ox4. VSS. On room air. . IS encouraged. SCD to RLE. Ankle pumps encouraged. Tolerating diet. Last BM 8/7. Voiding. Pain managed with PO medication. Healing surgical incision to left knee, NATALIA drain in place. Ambulating with standby assist with walker and gait belt. PICC to RUE, on IV Cefepine q8h, IV Vanco q12h, and PO Flagyl. Plan is to dc home on IV antibiotics when cleared. Patient updated and in agreement with plan of care. Safety precautions in place. Instructed patient to call for assistance, call light within reach.

## 2023-08-10 ENCOUNTER — PATIENT OUTREACH (OUTPATIENT)
Dept: CASE MANAGEMENT | Age: 63
End: 2023-08-10

## 2023-08-10 NOTE — PROGRESS NOTES
Responding to patient's voicemail. (Dc 8/8)     Infectious Disease - JORGE Huang in 1 week(s)  [address]  [address] [place] [place] [address]  [address] [address] [phone]    Patient scheduled appointment. Confirmed with patient. Closing encounter.

## 2023-08-10 NOTE — PROGRESS NOTES
VM received; pt requesting assistance w/scheduling appts. Pt states appt having a hard time getting an appt w/ Infectious Disease.        Follow Up Appointments:  PCP - Vida Mendez MD as soon as possible for a visit in 1 week(s)  [address]  [address] [phone]    Infectious Disease - JORGE Olmedo in 1 week(s)  [address]  [address] [facility], [address] [facility] [address] [place] [address]    Katie Valverde MD in 2 week(s)  Specialty: [facility]  14[address] [facility], [address] [place] [phone]

## 2024-03-04 PROCEDURE — 36415 COLL VENOUS BLD VENIPUNCTURE: CPT

## 2024-03-04 PROCEDURE — 99285 EMERGENCY DEPT VISIT HI MDM: CPT

## 2024-03-04 PROCEDURE — 96365 THER/PROPH/DIAG IV INF INIT: CPT

## 2024-03-05 ENCOUNTER — HOSPITAL ENCOUNTER (INPATIENT)
Facility: HOSPITAL | Age: 64
LOS: 1 days | Discharge: HOME | End: 2024-03-07
Attending: EMERGENCY MEDICINE | Admitting: INTERNAL MEDICINE
Payer: MEDICARE

## 2024-03-05 DIAGNOSIS — L03.116 CELLULITIS OF LEFT LEG: Primary | ICD-10-CM

## 2024-03-05 LAB
ALBUMIN SERPL-MCNC: 4.1 G/DL (ref 3.4–5)
ALBUMIN/GLOB SERPL: 1.3 {RATIO} (ref 1–2)
ALP LIVER SERPL-CCNC: 72 U/L
ALT SERPL-CCNC: 35 U/L
ANION GAP SERPL CALC-SCNC: 2 MMOL/L (ref 0–18)
AST SERPL-CCNC: 19 U/L (ref 15–37)
BASOPHILS # BLD AUTO: 0.04 X10(3) UL (ref 0–0.2)
BASOPHILS NFR BLD AUTO: 0.3 %
BILIRUB SERPL-MCNC: 0.4 MG/DL (ref 0.1–2)
BUN BLD-MCNC: 17 MG/DL (ref 9–23)
CALCIUM BLD-MCNC: 9.1 MG/DL (ref 8.5–10.1)
CHLORIDE SERPL-SCNC: 102 MMOL/L (ref 98–112)
CO2 SERPL-SCNC: 28 MMOL/L (ref 21–32)
CREAT BLD-MCNC: 0.72 MG/DL
EGFRCR SERPLBLD CKD-EPI 2021: 93 ML/MIN/1.73M2 (ref 60–?)
EOSINOPHIL # BLD AUTO: 0.29 X10(3) UL (ref 0–0.7)
EOSINOPHIL NFR BLD AUTO: 2.2 %
ERYTHROCYTE [DISTWIDTH] IN BLOOD BY AUTOMATED COUNT: 12.5 %
GLOBULIN PLAS-MCNC: 3.2 G/DL (ref 2.8–4.4)
GLUCOSE BLD-MCNC: 102 MG/DL (ref 70–99)
HCT VFR BLD AUTO: 35.7 %
HGB BLD-MCNC: 11.9 G/DL
IMM GRANULOCYTES # BLD AUTO: 0.03 X10(3) UL (ref 0–1)
IMM GRANULOCYTES NFR BLD: 0.2 %
LACTATE SERPL-SCNC: 1 MMOL/L (ref 0.4–2)
LYMPHOCYTES # BLD AUTO: 2.09 X10(3) UL (ref 1–4)
LYMPHOCYTES NFR BLD AUTO: 16.1 %
MCH RBC QN AUTO: 31.2 PG (ref 26–34)
MCHC RBC AUTO-ENTMCNC: 33.3 G/DL (ref 31–37)
MCV RBC AUTO: 93.5 FL
MONOCYTES # BLD AUTO: 1.25 X10(3) UL (ref 0.1–1)
MONOCYTES NFR BLD AUTO: 9.6 %
NEUTROPHILS # BLD AUTO: 9.31 X10 (3) UL (ref 1.5–7.7)
NEUTROPHILS # BLD AUTO: 9.31 X10(3) UL (ref 1.5–7.7)
NEUTROPHILS NFR BLD AUTO: 71.6 %
OSMOLALITY SERPL CALC.SUM OF ELEC: 276 MOSM/KG (ref 275–295)
PLATELET # BLD AUTO: 245 10(3)UL (ref 150–450)
POTASSIUM SERPL-SCNC: 3.8 MMOL/L (ref 3.5–5.1)
PROT SERPL-MCNC: 7.3 G/DL (ref 6.4–8.2)
RBC # BLD AUTO: 3.82 X10(6)UL
SODIUM SERPL-SCNC: 132 MMOL/L (ref 136–145)
WBC # BLD AUTO: 13 X10(3) UL (ref 4–11)

## 2024-03-05 PROCEDURE — 87077 CULTURE AEROBIC IDENTIFY: CPT | Performed by: EMERGENCY MEDICINE

## 2024-03-05 PROCEDURE — 85025 COMPLETE CBC W/AUTO DIFF WBC: CPT | Performed by: EMERGENCY MEDICINE

## 2024-03-05 PROCEDURE — 87205 SMEAR GRAM STAIN: CPT | Performed by: EMERGENCY MEDICINE

## 2024-03-05 PROCEDURE — 87186 SC STD MICRODIL/AGAR DIL: CPT | Performed by: EMERGENCY MEDICINE

## 2024-03-05 PROCEDURE — 87040 BLOOD CULTURE FOR BACTERIA: CPT | Performed by: EMERGENCY MEDICINE

## 2024-03-05 PROCEDURE — 87070 CULTURE OTHR SPECIMN AEROBIC: CPT | Performed by: EMERGENCY MEDICINE

## 2024-03-05 PROCEDURE — 83605 ASSAY OF LACTIC ACID: CPT | Performed by: EMERGENCY MEDICINE

## 2024-03-05 PROCEDURE — 80053 COMPREHEN METABOLIC PANEL: CPT | Performed by: EMERGENCY MEDICINE

## 2024-03-05 RX ORDER — SODIUM CHLORIDE 9 MG/ML
INJECTION, SOLUTION INTRAVENOUS CONTINUOUS
Status: ACTIVE | OUTPATIENT
Start: 2024-03-05 | End: 2024-03-05

## 2024-03-05 RX ORDER — MINERAL OIL AND PETROLATUM 150; 830 MG/G; MG/G
1 OINTMENT OPHTHALMIC 3 TIMES DAILY PRN
Status: DISCONTINUED | OUTPATIENT
Start: 2024-03-05 | End: 2024-03-07

## 2024-03-05 RX ORDER — ASPIRIN 81 MG/1
81 TABLET, CHEWABLE ORAL DAILY
Status: DISCONTINUED | OUTPATIENT
Start: 2024-03-05 | End: 2024-03-07

## 2024-03-05 RX ORDER — METRONIDAZOLE 500 MG/1
500 TABLET ORAL EVERY 8 HOURS SCHEDULED
Status: DISCONTINUED | OUTPATIENT
Start: 2024-03-05 | End: 2024-03-07

## 2024-03-05 RX ORDER — SENNOSIDES 8.6 MG
17.2 TABLET ORAL NIGHTLY PRN
Status: DISCONTINUED | OUTPATIENT
Start: 2024-03-05 | End: 2024-03-07

## 2024-03-05 RX ORDER — ATORVASTATIN CALCIUM 20 MG/1
20 TABLET, FILM COATED ORAL NIGHTLY
Status: DISCONTINUED | OUTPATIENT
Start: 2024-03-05 | End: 2024-03-07

## 2024-03-05 RX ORDER — GABAPENTIN 300 MG/1
300 CAPSULE ORAL 3 TIMES DAILY
Status: DISCONTINUED | OUTPATIENT
Start: 2024-03-05 | End: 2024-03-07

## 2024-03-05 RX ORDER — HYDROCODONE BITARTRATE AND ACETAMINOPHEN 5; 325 MG/1; MG/1
1 TABLET ORAL EVERY 4 HOURS PRN
Status: DISCONTINUED | OUTPATIENT
Start: 2024-03-05 | End: 2024-03-07

## 2024-03-05 RX ORDER — ONDANSETRON 2 MG/ML
4 INJECTION INTRAMUSCULAR; INTRAVENOUS EVERY 6 HOURS PRN
Status: DISCONTINUED | OUTPATIENT
Start: 2024-03-05 | End: 2024-03-07

## 2024-03-05 RX ORDER — ZINC 25 MG
25 TABLET ORAL DAILY
COMMUNITY

## 2024-03-05 RX ORDER — HYDROCODONE BITARTRATE AND ACETAMINOPHEN 5; 325 MG/1; MG/1
2 TABLET ORAL EVERY 4 HOURS PRN
Status: DISCONTINUED | OUTPATIENT
Start: 2024-03-05 | End: 2024-03-07

## 2024-03-05 RX ORDER — MORPHINE SULFATE 4 MG/ML
2 INJECTION, SOLUTION INTRAMUSCULAR; INTRAVENOUS EVERY 2 HOUR PRN
Status: DISCONTINUED | OUTPATIENT
Start: 2024-03-05 | End: 2024-03-07

## 2024-03-05 RX ORDER — MAGNESIUM 200 MG
400 TABLET ORAL DAILY
COMMUNITY
End: 2024-03-07

## 2024-03-05 RX ORDER — MELATONIN
1000 DAILY
COMMUNITY

## 2024-03-05 RX ORDER — HYDROXYCHLOROQUINE SULFATE 200 MG/1
200 TABLET, FILM COATED ORAL 2 TIMES DAILY
Status: DISCONTINUED | OUTPATIENT
Start: 2024-03-05 | End: 2024-03-07

## 2024-03-05 RX ORDER — MORPHINE SULFATE 4 MG/ML
4 INJECTION, SOLUTION INTRAMUSCULAR; INTRAVENOUS EVERY 2 HOUR PRN
Status: DISCONTINUED | OUTPATIENT
Start: 2024-03-05 | End: 2024-03-07

## 2024-03-05 RX ORDER — ACETAMINOPHEN 500 MG
500 TABLET ORAL EVERY 4 HOURS PRN
Status: DISCONTINUED | OUTPATIENT
Start: 2024-03-05 | End: 2024-03-07

## 2024-03-05 RX ORDER — MORPHINE SULFATE 4 MG/ML
1 INJECTION, SOLUTION INTRAMUSCULAR; INTRAVENOUS EVERY 2 HOUR PRN
Status: DISCONTINUED | OUTPATIENT
Start: 2024-03-05 | End: 2024-03-07

## 2024-03-05 RX ORDER — METOCLOPRAMIDE HYDROCHLORIDE 5 MG/ML
10 INJECTION INTRAMUSCULAR; INTRAVENOUS EVERY 8 HOURS PRN
Status: DISCONTINUED | OUTPATIENT
Start: 2024-03-05 | End: 2024-03-07

## 2024-03-05 RX ORDER — SODIUM CHLORIDE 9 MG/ML
INJECTION, SOLUTION INTRAVENOUS CONTINUOUS
Status: DISCONTINUED | OUTPATIENT
Start: 2024-03-05 | End: 2024-03-07

## 2024-03-05 RX ORDER — ENEMA 19; 7 G/133ML; G/133ML
1 ENEMA RECTAL ONCE AS NEEDED
Status: DISCONTINUED | OUTPATIENT
Start: 2024-03-05 | End: 2024-03-07

## 2024-03-05 RX ORDER — ACETAMINOPHEN 325 MG/1
650 TABLET ORAL EVERY 4 HOURS PRN
Status: DISCONTINUED | OUTPATIENT
Start: 2024-03-05 | End: 2024-03-07

## 2024-03-05 RX ORDER — HEPARIN SODIUM 5000 [USP'U]/ML
5000 INJECTION, SOLUTION INTRAVENOUS; SUBCUTANEOUS EVERY 8 HOURS SCHEDULED
Status: DISCONTINUED | OUTPATIENT
Start: 2024-03-05 | End: 2024-03-07

## 2024-03-05 RX ORDER — MELOXICAM 15 MG/1
7.5 TABLET ORAL NIGHTLY
COMMUNITY
End: 2024-03-07

## 2024-03-05 RX ORDER — POLYETHYLENE GLYCOL 3350 17 G/17G
17 POWDER, FOR SOLUTION ORAL DAILY PRN
Status: DISCONTINUED | OUTPATIENT
Start: 2024-03-05 | End: 2024-03-07

## 2024-03-05 RX ORDER — POTASSIUM CHLORIDE 20 MEQ/1
40 TABLET, EXTENDED RELEASE ORAL ONCE
Status: COMPLETED | OUTPATIENT
Start: 2024-03-05 | End: 2024-03-05

## 2024-03-05 RX ORDER — BISACODYL 10 MG
10 SUPPOSITORY, RECTAL RECTAL
Status: DISCONTINUED | OUTPATIENT
Start: 2024-03-05 | End: 2024-03-07

## 2024-03-05 RX ORDER — VANCOMYCIN HYDROCHLORIDE
1250 EVERY 24 HOURS
Status: DISCONTINUED | OUTPATIENT
Start: 2024-03-05 | End: 2024-03-07

## 2024-03-05 NOTE — ED PROVIDER NOTES
Patient Seen in: [facility] Emergency Department      History     Chief Complaint   Patient presents with    Cellulitis     Stated Complaint: LEFT LEG INCISION FROM KNEE REPLACEMENT A YEAR AGO WITH COMPLICATIONS NOW OPEN *    Subjective:   HPI    64-year-old presenting emergency department for injection.  Patient states she has a left leg incision that is been healing well she did have some complications initially when it happened but over the last couple days been getting more red firm and today there is drainage of purulent material prompting her visit here.  She denies fevers chills or any other constitutional symptoms or any other exacerbating relieving factors or associated symptoms.    Objective:   Past Medical History:   Diagnosis Date    Back problem     Benign essential hypertension 2010    Cataract     High cholesterol     History of COVID-19 2022    fever, congestion- several days, fatigue x 14 days. Sx's resolved. Not hospitalized.    Neuropathy     Omid hands and feet    Rheumatoid arthritis (HCC)     Tobacco abuse 2010    Visual impairment     glasses              Past Surgical History:   Procedure Laterality Date          x2    CATARACT EXTRACTION Right     OTHER SURGICAL HISTORY      cervical 4- 5 spine fusion    OTHER SURGICAL HISTORY Right 2022    right shoulder replacement    SPINE SURGERY PROCEDURE UNLISTED  10/26/2021    lumbar 4-5 TLIF    STABISMUS SURG,SUPER-OBLIQ MUSC  Age 1    TOTAL KNEE REPLACEMENT Left 2023                Social History     Socioeconomic History    Marital status:     Number of children: 2   Occupational History    Occupation:    Tobacco Use    Smoking status: Former     Packs/day: 0.50     Years: 20.00     Additional pack years: 0.00     Total pack years: 10.00     Types: Cigarettes     Quit date: 2021     Years since quittin.5    Smokeless tobacco: Never   Vaping Use    Vaping Use: Never used   Substance and  Sexual Activity    Alcohol use: Not Currently    Drug use: No    Sexual activity: Yes     Partners: Male   Social History Narrative    . 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 Smokes about 2 cigarettes per day. Social alcohol use. No illicits. No structured exercise. 2 children.              Review of Systems    Positive for stated complaint: LEFT LEG INCISION FROM KNEE REPLACEMENT A YEAR AGO WITH COMPLICATIONS NOW OPEN *  Other systems are as noted in HPI.  Constitutional and vital signs reviewed.      All other systems reviewed and negative except as noted above.    Physical Exam     ED Triage Vitals [03/04/24 2319]   /83   Pulse 79   Resp 18   Temp 97 °F (36.1 °C)   Temp src Temporal   SpO2 99 %   O2 Device None (Room air)       Current:/76   Pulse 71   Temp 97 °F (36.1 °C) (Temporal)   Resp 18   Ht 154.9 cm (5' 1\")   Wt 73 kg   SpO2 100%   BMI 30.42 kg/m²         Physical Exam  Awake alert patient appears no distress HEENT exam is normal lungs are clear cardiovascular exam shows regular rhythm abdomen soft nontender extremities no Cyanosis or edema except for left leg patient is noted to have.  Material expressed from the wound of the left leg just distal to the left knee medial aspect the patient has not had surrounding erythema with no subcutaneous emphysema or crepitance no streaking redness       ED Course     Labs Reviewed   CBC W/ DIFFERENTIAL - Abnormal; Notable for the following components:       Result Value    WBC 13.0 (*)     HGB 11.9 (*)     Neutrophil Absolute Prelim 9.31 (*)     Neutrophil Absolute 9.31 (*)     Monocyte Absolute 1.25 (*)     All other components within normal limits   LACTIC ACID, PLASMA - Normal   CBC WITH DIFFERENTIAL WITH PLATELET    Narrative:     The following orders were created for panel order CBC With Differential With Platelet.  Procedure                               Abnormality         Status                     ---------                                -----------         ------                     CBC W/ DIFFERENTIAL[[id number]]          Abnormal            Final result                 Please view results for these tests on the individual orders.   COMP METABOLIC PANEL (14)   RAINBOW DRAW BLUE   BLOOD CULTURE   BLOOD CULTURE   AEROBIC BACTERIAL CULTURE             Differential diagnosis includes necrotizing fasciitis cellulitis         MDM        Admission disposition: 3/5/2024  1:49 AM                                        Medical Decision Making  64-year-old female presenting with left leg infection.  IV established cardiac monitor shows a sinus rhythm pulse ox shows no signs of hypoxia.  Unasyn ordered clinical signs or symptoms of pain or proportion to exam or signs of necrotizing fasciitis at this time.  Wound culture sent patient was ordered for ordered IV antibiotics and emerged prior CBC shows a no elevation white cell count lactic acid shows no elevation indicative of sepsis patient will be admitted for IV antibiotics and further evaluation  This note was prepared using Dragon Medical voice recognition dictation software.  As a result errors may occur.  When identified to these areas have been corrected.  While every attempt is made to correct errors during dictation discrepancies may still exist.  Please contact if there are any errors    Problems Addressed:  Cellulitis of left leg: acute illness or injury    Amount and/or Complexity of Data Reviewed  Labs: ordered. Decision-making details documented in ED Course.  ECG/medicine tests: ordered and independent interpretation performed. Decision-making details documented in ED Course.    Risk  Decision regarding hospitalization.        Disposition and Plan     Clinical Impression:  1. Cellulitis of left leg         Disposition:  Admit  3/5/2024  1:49 am    Follow-up:  No follow-up provider specified.        Medications Prescribed:  Current Discharge Medication List                             Hospital Problems       Present on Admission  Date Reviewed: 12/13/2023            ICD-10-CM Noted POA    * (Principal) Cellulitis of left leg L03.116 3/5/2024 Unknown

## 2024-03-05 NOTE — CONSULTS
Infectious Disease Initial Consultation      Date of admission: 3/5/2024 12:23 AM     Date of service: 03/05/24 9:30 AM    Consult requested by: Belgica Jones MD     Reason for consult: Left knee surgical wound infection    Chief complaint: Dehiscence of left knee wound    History of present illness: Nery Purvis is a 64 year old female with with history of left knee replacement in April 2023.  The patient has been doing well since then without any complications.  However, over the last few days, the patient noted that her knee was getting red, firm and then her wound opened up with.  No drainage so she went to the emergency room.  No fevers or chills.  No other constitutional symptoms.  Back in August 2023.  The patient got admitted with a lower extremity cellulitis involving that same leg with a large complex fluid collection.  Drainage cultures had grown MSSA, strep mitis and Prevotella.  Her synovial aspirate back in June when her infection started off did not show any evidence of infection and she was on antibiotics since then.  She underwent a muscle flap with plastic surgery on 6/21.  The patient was discharged in August on p.o. Keflex and Flagyl x 10 days.    In the emergency room, the patient was afebrile, hemodynamically stable.  Labs revealed leukocytosis white count of 13 with a left shift.  BMP was unremarkable.  LFTs unremarkable.  Drainage cultures were obtained, currently pending.  2 sets of blood cultures obtained, with no growth to date.  The patient was started on IV ceftriaxone and admitted to the medical floor.    Review of systems:  All other components of the review of systems are negative, except those described in the history of present illness.     Past Medical History:   Diagnosis Date    Back problem     Benign essential hypertension 03/03/2010    Cataract     High cholesterol     History of COVID-19 12/26/2022    fever, congestion- several days, fatigue x 14 days. Sx's  resolved. Not hospitalized.    Neuropathy     Omid hands and feet    Rheumatoid arthritis (HCC)     Tobacco abuse 2010    Visual impairment     glasses     Past Surgical History:   Procedure Laterality Date          x2    CATARACT      CATARACT EXTRACTION Right     OTHER SURGICAL HISTORY      cervical 4- 5 spine fusion    OTHER SURGICAL HISTORY Right 2022    right shoulder replacement    SPINE SURGERY PROCEDURE UNLISTED  10/26/2021    lumbar 4-5 TLIF    STABISMUS SURG,SUPER-OBLIQ MUSC  Age 1    TOTAL KNEE REPLACEMENT Left 2023     Social History     Socioeconomic History    Marital status:     Number of children: 2   Occupational History    Occupation:    Tobacco Use    Smoking status: Former     Packs/day: 0.50     Years: 20.00     Additional pack years: 0.00     Total pack years: 10.00     Types: Cigarettes     Quit date: 2021     Years since quittin.5    Smokeless tobacco: Never   Vaping Use    Vaping Use: Never used   Substance and Sexual Activity    Alcohol use: Not Currently    Drug use: No    Sexual activity: Yes     Partners: Male   Social History Narrative    . 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 Social alcohol use. No illicits. No structured exercise. 2 children.     Social Determinants of Health     Food Insecurity: No Food Insecurity (3/5/2024)    Food Insecurity     Food Insecurity: Never true   Transportation Needs: No Transportation Needs (3/5/2024)    Transportation Needs     Lack of Transportation: No   Housing Stability: Low Risk  (3/5/2024)    Housing Stability     Housing Instability: No     Family History   Problem Relation Age of Onset    Genito-Urinary Disorder Father         BPH    Cancer Father         basal cell carcinoma    Heart Disorder Father         a fib     Thyroid Disorder Mother         goiter, thyroid surgery, now on Synthroid    Allergies Daughter         celiac disease     Musculo-skelatal Disorder Daughter     Asthma Daughter     Obesity Son     Diabetes Maternal Grandmother         type 2 diabetes    Other (cva) Maternal Grandmother     Other (Other) Paternal Grandmother         CVA    Diabetes Paternal Grandmother         type 2 diabetes    Psychiatric Sister         anxiety    Breast Cancer Maternal Aunt 65        60's     Reviewed, see above    Medications:    sodium chloride    heparin    acetaminophen    morphINE **OR** morphINE **OR** morphINE    polyethylene glycol (PEG 3350)    sennosides    bisacodyl    fleet enema    ondansetron    metoclopramide    acetaminophen **OR** HYDROcodone-acetaminophen **OR** HYDROcodone-acetaminophen    cefTRIAXone    aspirin    atorvastatin    gabapentin    hydroxychloroquine     Allergies:  Allergies   Allergen Reactions    Tramadol OTHER (SEE COMMENTS)     \"I felt goofy and all my muscles were lvei\"    Other RASH     CAPSAICIN CREAM       Physical Exam:  Vitals:    03/05/24 0827   BP: 142/75   Pulse: 73   Resp: 13   Temp: 98.3 °F (36.8 °C)     Vitals signs and nursing note reviewed.   Constitutional:       Appearance: Normal appearance.   HENT:      Head: Normocephalic and atraumatic.      Mouth: Mucous membranes are moist.   Neck:      Musculoskeletal: Neck supple.   Cardiovascular:      Rate and Rhythm: Normal rate.      Heart sounds: Normal heart sounds. No murmur. No friction rub. No gallop.    Pulmonary:      Effort: Pulmonary effort is normal. No respiratory distress.      Breath sounds: Normal breath sounds. No stridor. No wheezing, rhonchi or rales.   Chest:      Chest wall: No tenderness.   Abdominal:      General: Abdomen is flat. There is no distension.      Palpations: Abdomen is soft. There is no mass.      Tenderness: There is no tenderness. There is no guarding or rebound.      Hernia: No hernia is present.   Musculoskeletal:      Right lower leg: No edema.      Left lower leg: No edema.  Left knee surgical incision  noted with a small area of dehiscence and purulent drainage noted.  Cellulitis improving.  Skin:     General: Skin is warm and dry.   Neurological:      General: No focal deficit present.      Mental Status: Alert and oriented to person, place, and time.     Laboratory data:  I have independently reviewed all lab results; including old microbiological results.  Lab Results   Component Value Date    WBC 13.0 03/05/2024    HGB 11.9 03/05/2024    HCT 35.7 03/05/2024    .0 03/05/2024    CREATSERUM 0.72 03/05/2024    BUN 17 03/05/2024     03/05/2024    K 3.8 03/05/2024     03/05/2024    CO2 28.0 03/05/2024     03/05/2024    CA 9.1 03/05/2024    ALB 4.1 03/05/2024    ALKPHO 72 03/05/2024    BILT 0.4 03/05/2024    TP 7.3 03/05/2024    AST 19 03/05/2024    ALT 35 03/05/2024        Recent Labs   Lab 03/05/24  0112   RBC 3.82   HGB 11.9*   HCT 35.7   MCV 93.5   MCH 31.2   MCHC 33.3   RDW 12.5   NEPRELIM 9.31*   WBC 13.0*   .0       Microbiology data:  No results found for this visit on 03/05/24.    Impression:  Nery Purvis is a 64 year old female with     Left knee wound dehiscence in the setting of knee replacement back in April 2023  Highly concerning for underlying prosthetic joint infection  Drainage cultures currently pending  Blood cultures with no growth to date  Currently on IV ceftriaxone  History of postop left knee infection  Original knee arthroplasty was in April 2023  Underwent I&D on 5/22/2023  No concerns for a deeper infection at that point  Underwent a synovial aspirate in June that was not consistent with an infection  Underwent further I&D that was superficial with cultures then growing MSSA, strep mitis, Prevotella and corynebacterium  Status post flapping and grafting with plastic surgery in June 2021  Was discharged on p.o. cephalexin and metronidazole at that point  Readmitted in August with a complex fluid collection, concerning for underlying abscess  Status  post IR drainage on  with cultures with no growth to date, at that point it was more likely to be a seroma than an infection  Completed daptomycin and Invanz on 2023 followed by 10 days of cephalexin and metronidazole  Immunocompromise due to history of rheumatoid arthritis  On Plaquenil and Enbrel    Recommendations:     Continue IV ceftriaxone  Start IV vancomycin, pharmacy to dose  Start p.o. metronidazole 500 mg 3 times daily  Continue to follow-up on culture data  Consult orthopedics  Continue to monitor daily labs for antibiotic toxicity  Further recommendations will depend on the above work-up and clinical progress     The plan of care was discussed with the primary hospital team, Belgica Jones MD     Recommendations were also discussed with the patient; all questions were answered.     Thank you for this consultation. Please don't hesitate to call the ID team for questions or any acute changes in patient's clinical condition.    Please note that this report has been produced using speech recognition software and may contain errors related to that system including, but not limited to, errors in grammar, punctuation, and spelling, as well as words and phrases that possibly may have been recognized inappropriately.  If there are any questions or concerns, contact the dictating provider for clarification.    The  Century Cures Act makes medical notes like these available to patients in the interest of transparency. Please be advised this is a medical document. Medical documents are intended to carry relevant information, facts as evident, and the clinical opinion of the practitioner. The medical note is intended as peer to peer communication and may appear blunt or direct. It is written in medical language and may contain abbreviations or verbiage that are unfamiliar.     Tyree Hannah MD  [facility]. Tel: [phone]. Fax: [phone].     Nery Purvis : 1960  MRN: [id number] CSN: [id number]

## 2024-03-05 NOTE — ED QUICK NOTES
Orders for admission, patient is aware of plan and ready to go upstairs. Any questions, please call ED RN Kavita at extension [phone].     Patient Covid vaccination status: Fully vaccinated     COVID Test Ordered in ED: None    COVID Suspicion at Admission: N/A    Running Infusions:  None    Mental Status/LOC at time of transport: A/Ox3    Other pertinent information:   CIWA score: N/A   NIH score:  N/A

## 2024-03-05 NOTE — CONSULTS
[facility] Dosing Service      Initial Pharmacokinetic Consult for Vancomycin Dosing     Nery Purvis is a 64 year old female who is being initiated on vancomycin therapy for osteomyelitis.  Pharmacy has been asked to dose vancomycin by Dr. Hannah.  The initial treatment and monitoring approach will be steady state AUC strategy.        Weight and Temperature:    Wt Readings from Last 1 Encounters:   24 73 kg (161 lb)        Temp Readings from Last 1 Encounters:   24 98.3 °F (36.8 °C) (Oral)      Labs:   Recent Labs   Lab 24  0112   CREATSERUM 0.72      Estimated Creatinine Clearance: 58.8 mL/min (based on SCr of 0.72 mg/dL).     Recent Labs   Lab 24  0112   WBC 13.0*          The Pharmacokinetic Target is:     to 600 mg-h/L and trough <=15 mg/L    Renal Dosing Considerations:    None     Assessment/Plan:   Initial/Loading dose: Will receive 1250 mg IV (15 mg/kg, capped at 2250 mg) x 1 initial dose.      Maintenance dose: Pharmacy will dose vancomycin at 1250 mg IV every 24 hours    Monitorin) Plan for vancomycin peak and trough to be obtained in 5 - 7 days    2) Pharmacy will order SCr as clinically indicated to assess renal function.    3) Pharmacy will monitor for toxicity and efficacy, adjust vancomycin dose and/or frequency, and order vancomycin levels as appropriate per the Pharmacy and Therapeutics Committee approved protocol until discontinuation of the medication.       We appreciate the opportunity to assist in the care of this patient.     Marco Antonio Mcfadden, PharmD  3/5/2024  9:46 AM  [facility] Extension: [phone]

## 2024-03-05 NOTE — PLAN OF CARE
Assumed care of patient at 0700.   Resting comfortably in bed.   Left leg wrapped. NOC RN reports oozing and drainage from knee site.       Problem: SKIN/TISSUE INTEGRITY - ADULT  Goal: Skin integrity remains intact  Description: INTERVENTIONS  - Assess and document risk factors for pressure ulcer development  - Assess and document skin integrity  - Monitor for areas of redness and/or skin breakdown  - Initiate interventions, skin care algorithm/standards of care as needed  Outcome: Progressing  Goal: Incision(s), wounds(s) or drain site(s) healing without S/S of infection  Description: INTERVENTIONS:  - Assess and document risk factors for pressure ulcer development  - Assess and document skin integrity  - Assess and document dressing/incision, wound bed, drain sites and surrounding tissue  - Implement wound care per orders  - Initiate isolation precautions as appropriate  - Initiate Pressure Ulcer prevention bundle as indicated  Outcome: Progressing  Goal: Oral mucous membranes remain intact  Description: INTERVENTIONS  - Assess oral mucosa and hygiene practices  - Implement preventative oral hygiene regimen  - Implement oral medicated treatments as ordered  Outcome: Progressing

## 2024-03-05 NOTE — ED INITIAL ASSESSMENT (HPI)
Pt states \"my left leg has been leaking blood, pus, and water.\" Pt states noticing this tonight. Pt states hx of knee replacement a year ago with lots of complications since.

## 2024-03-05 NOTE — PLAN OF CARE
NURSING ADMISSION NOTE      Patient admitted via Cart  Oriented to room.  Safety precautions initiated.  Bed in low position.  Call light in reach.    Assumed care of pt around 0400.  A/o x4. RA, 1L NC while sleeping.   LLE w/ open wound. Leaking SS/purulent drainage.  Dressing changed, C/D/I.   IVF infusing.   IV Rocephin as ordered.   PRN Tylenol given for pain w/ adequate relief.   Resting in bed w/ call light within reach & safety precautions in place.

## 2024-03-05 NOTE — H&P
[facility] Hospitalist H&P       CC:   Chief Complaint   Patient presents with    Cellulitis        PCP: Silvia Alfredo MD    History of Present Illness:    Patient is 64-year-old female significant past medical history of rheumatoid arthritis, hypertension, hyperlipidemia, left TKA in 04/2023, complicated by wound infection as well as I&D of left knee in May 2023.  Continue to have drainage from the wound again status post I&D of the left knee showed cultures consistent with MSSA, strep and patella as well as corynebacterium, patient then had flap and grafting surgery by plastic surgery in June 2023 she was discharged on p.o. antibiotics at this time, she subsequently had significant erythema of the knee again in July 2023 s-she was discharged at this time again on IV antibiotics.  This time patient noted that on Saturday left lower extremity calf was very red hot as well as rigid and stiff, also had significant pain,-patient at that time did notice excessive drainage yesterday her pants were drenched because there was pus as well as water coming out through through the open incision, and her leg.  She does not have any fevers or chills, has a caregiver at home, otherwise review of system is complete negative.    In the emergency room patient has been afebrile, rest of vitals been stable  BMP was unremarkable, CBC showed a white count of 13.0  No imaging was done patient was admitted and given Unasyn wound cultures were sent    Infectious disease was consulted    [facility]  Past Medical History:   Diagnosis Date    Back problem     Benign essential hypertension 03/03/2010    Cataract     High cholesterol     History of COVID-19 12/26/2022    fever, congestion- several days, fatigue x 14 days. Sx's resolved. Not hospitalized.    Neuropathy     Omid hands and feet    Rheumatoid arthritis (HCC)     Tobacco abuse 02/17/2010    Visual impairment     glasses        PSH  Past Surgical History:   Procedure Laterality Date           x2    CATARACT      CATARACT EXTRACTION Right     OTHER SURGICAL HISTORY      cervical 4- 5 spine fusion    OTHER SURGICAL HISTORY Right 2022    right shoulder replacement    SPINE SURGERY PROCEDURE UNLISTED  10/26/2021    lumbar 4-5 TLIF    STABISMUS SURG,SUPER-OBLIQ MUSC  Age 1    TOTAL KNEE REPLACEMENT Left 2023        ALL:  Allergies   Allergen Reactions    Tramadol OTHER (SEE COMMENTS)     \"I felt goofy and all my muscles were levi\"    Other RASH     CAPSAICIN CREAM        Home Medications:  Outpatient Medications Marked as Taking for the 3/5/24 encounter (Hospital Encounter)   Medication Sig Dispense Refill    Meloxicam 15 MG Oral Tab Take 0.5 tablets (7.5 mg total) by mouth at bedtime.      OMEGA 3-6-9 FATTY ACIDS OR Take 3,600 mg by mouth in the morning, at noon, and at bedtime. 1 tab TID      Magnesium 200 MG Oral Tab Take 2 tablets (400 mg total) by mouth daily.      Zinc 25 MG Oral Tab Take 25 mg by mouth daily.      Cholecalciferol 125 MCG (5000 UT) Oral Tab Take 1 tablet (5,000 Units total) by mouth daily. Pt takes 4000un Vitamin D3      cyanocobalamin 1000 MCG Oral Tab Take 1 tablet (1,000 mcg total) by mouth daily.      Upadacitinib (RINVOQ OR) Take by mouth.      aspirin 81 MG Oral Chew Tab Chew 1 tablet (81 mg total) by mouth daily.      acetaminophen 500 MG Oral Tab Take 1 tablet (500 mg total) by mouth every 6 (six) hours as needed for Pain. 120 tablet 0    gabapentin 400 MG Oral Cap Take 300 mg by mouth 3 (three) times daily. 90 capsule 0    TURMERIC OR Take 1 tablet by mouth in the morning, at noon, and at bedtime.      hydroxychloroquine 200 MG Oral Tab Take 1 tablet (200 mg total) by mouth 2 (two) times daily.      atorvastatin 20 MG Oral Tab Take 1 tablet (20 mg total) by mouth nightly.      Calcium Citrate-Vitamin D (CALCIUM CITRATE + D3 OR) Take 1 tablet by mouth daily.      vitamin E 400 UNITS Oral Cap Take 1 capsule (400 Units total) by mouth 2 (two) times  daily.      BIOTIN OR Take 1 tablet by mouth daily.      Ascorbic Acid (VITAMIN C OR) Take 1 tablet by mouth daily.      loratadine 10 MG Oral Tab Take 1 tablet (10 mg total) by mouth daily.           Soc Hx  Social History     Tobacco Use    Smoking status: Former     Packs/day: 0.50     Years: 20.00     Additional pack years: 0.00     Total pack years: 10.00     Types: Cigarettes     Quit date: 2021     Years since quittin.5    Smokeless tobacco: Never   Substance Use Topics    Alcohol use: Not Currently        Fam Hx  Family History   Problem Relation Age of Onset    Genito-Urinary Disorder Father         BPH    Cancer Father         basal cell carcinoma    Heart Disorder Father         a fib     Thyroid Disorder Mother         goiter, thyroid surgery, now on Synthroid    Allergies Daughter         celiac disease    Musculo-skelatal Disorder Daughter     Asthma Daughter     Obesity Son     Diabetes Maternal Grandmother         type 2 diabetes    Other (cva) Maternal Grandmother     Other (Other) Paternal Grandmother         CVA    Diabetes Paternal Grandmother         type 2 diabetes    Psychiatric Sister         anxiety    Breast Cancer Maternal Aunt 65        60's       Review of Systems  General: Denies unintentional weight loss, fevers, or chills-negative except for above  HEENT: Denies vision loss or double vision, denies hearing loss  Cardiovascular: Denies chest pain, palpitations, peripheral edema  Pulmonary: Denies cough, shortness of breath, or wheezing  Gastrointestinal: Denies abdominal pain, melena, or hematochezia  Genitourinary: Denies urinary frequency, urgency, and dysuria  Neurologic: Denies numbness, headaches, focal weakness  Skin: Denies rashes, sores  Endocrine: Denies heat or cold intolerance, denies polydipsia  Hematologic: Denies abnormal bleeding or bruising     OBJECTIVE:  /73 (BP Location: Right arm)   Pulse 67   Temp 98 °F (36.7 °C) (Oral)   Resp 18   Ht 5' 1\" (1.549  m)   Wt 161 lb (73 kg)   SpO2 95%   BMI 30.42 kg/m²     BP Readings from Last 3 Encounters:   03/05/24 139/73   12/13/23 (!) 171/81   10/25/23 126/77     Wt Readings from Last 3 Encounters:   03/05/24 161 lb (73 kg)   12/08/23 158 lb (71.7 kg)   10/20/23 160 lb (72.6 kg)       Wt Readings from Last 6 Encounters:   03/05/24 161 lb (73 kg)   12/08/23 158 lb (71.7 kg)   10/20/23 160 lb (72.6 kg)   08/04/23 158 lb (71.7 kg)   07/07/23 160 lb (72.6 kg)   06/12/23 160 lb 0.9 oz (72.6 kg)     Gen: No acute distress, alert and oriented x 3  Pulm: Lungs clear bilaterally, good inspiratory effort   CV:  nL S1/S2  Abd: soft, NT/ND, no hepatomegaly, +BS  MSK: moving all extremities, no edema left lower extremity in an dressing dressing with serous fluid saturated  Neuro: no focal deficits  Skin: no rashes/lesions  Psych: normal mood/affect          Diagnostic Data:    CBC/Chem  Recent Labs   Lab 03/05/24  0112   WBC 13.0*   HGB 11.9*   MCV 93.5   .0       Recent Labs   Lab 03/05/24  0112   *   K 3.8      CO2 28.0   BUN 17   CREATSERUM 0.72   *   CA 9.1       Recent Labs   Lab 03/05/24  0112   ALT 35   AST 19   ALB 4.1       No results for input(s): \"TROP\" in the last 168 hours.        Radiology: No results found.         ASSESSMENT / PLAN:         Patient is 64-year-old female significant past medical history of rheumatoid arthritis, hypertension, hyperlipidemia, left TKA in 04/2023, complicated by wound infection as well as I&D of left knee in May 2023    # Left surgical wound infection, wound dehiscence  #hx  of Left TKA     -complicated wound infection   -wound culture  -ID on consult  -rocephin , flagyl, vanc   -ortho consulted, plastic sugery  -consider CT left lower ext   -blood cx       #seronegative RA  -cont plaquenil    #HTN     #HLD      Prophy:  DVT: Heparin  Deconditioning prevention: PT OT MedSurg with telemetry    Dispo: admit to MedSurg with telemetry    Outpatient records reviewed  confirming patient's medical history and medications.     Further recommendations pending patient's clinical course.  [facility] hospitalist to continue to follow patient while in house    Time spent: greater than 95 minutes spent in d/w pt/family, coordination of care, and d/w staff.     Belgica davis MD   Internal Medicine  [facility] Hospitalist  Pager: [phone]

## 2024-03-05 NOTE — CONSULTS
[facility]  Report of Consultation    Nery Purvis Patient Status:  Observation    1960 MRN [id number]   Location [facility] 2SW-S Attending Belgica Jones MD   Hosp Day # 0 PCP Silvia Alfredo MD     REASON FOR CONSULTATION:  Left leg wound/infection    CHIEF COMPLAINT:  Left leg wound    HISTORY OF PRESENT ILLNESS:    Nery Purvis is a a(n) 64 year old female with past medical history of left TKR 2023 which was complicated with wound dehiscence. She ultimately underwent left gastroc flap 2023.  Recovery was very slow but overall has been doing well.   She reports that she must have sustained a cut to her left leg few days ago.  This leg wound on the muscle flap was draining infectious material.  No increase in left knee pain.  No fever.  +ho RA on immunosuppressive.      ROS:  Constitutional: Negative for fever, chills or sweats  Skin: Negative for abrasion, rash, laceration or puncture wound  HEENT: Negative for change in vision, blurred vision or loss of hearing  Respiratory: Negative for shortness of breath or cough  Cardiovascular: Negative for chest pain or palpitations  Gastrointestinal: Negative for nausea, vomiting or abdominal pain  Neurologic: Negative for loss of consciousness, altered mental status, change in speech or orientation  Endocrine: Negative for heat or cold intolerance, weight loss or gain  Hematological: Negative for bleeding, bruising or lymphadenopathy  Psychiatric: Negative for change in affect, anxiety or depression  Musculoskeletal: Negative except for HPI     HISTORY:  Past Medical History:   Diagnosis Date    Back problem     Benign essential hypertension 2010    Cataract     High cholesterol     History of COVID-19 2022    fever, congestion- several days, fatigue x 14 days. Sx's resolved. Not hospitalized.    Neuropathy     Omid hands and feet    Rheumatoid arthritis (HCC)     Tobacco abuse 2010    Visual  impairment     glasses     Past Surgical History:   Procedure Laterality Date          x2    CATARACT      CATARACT EXTRACTION Right     OTHER SURGICAL HISTORY      cervical 4- 5 spine fusion    OTHER SURGICAL HISTORY Right 2022    right shoulder replacement    SPINE SURGERY PROCEDURE UNLISTED  10/26/2021    lumbar 4-5 TLIF    STABISMUS SURG,SUPER-OBLIQ MUSC  Age 1    TOTAL KNEE REPLACEMENT Left 2023     Family History   Problem Relation Age of Onset    Genito-Urinary Disorder Father         BPH    Cancer Father         basal cell carcinoma    Heart Disorder Father         a fib     Thyroid Disorder Mother         goiter, thyroid surgery, now on Synthroid    Allergies Daughter         celiac disease    Musculo-skelatal Disorder Daughter     Asthma Daughter     Obesity Son     Diabetes Maternal Grandmother         type 2 diabetes    Other (cva) Maternal Grandmother     Other (Other) Paternal Grandmother         CVA    Diabetes Paternal Grandmother         type 2 diabetes    Psychiatric Sister         anxiety    Breast Cancer Maternal Aunt 65        60's      reports that she quit smoking about 2 years ago. Her smoking use included cigarettes. She has a 10 pack-year smoking history. She has never used smokeless tobacco. She reports that she does not currently use alcohol. She reports that she does not use drugs.    ALLERGIES:  Allergies   Allergen Reactions    Tramadol OTHER (SEE COMMENTS)     \"I felt goofy and all my muscles were levi\"    Other RASH     CAPSAICIN CREAM       MEDICATIONS:    Current Facility-Administered Medications:     sodium chloride 0.9% infusion, , Intravenous, Continuous    heparin (Porcine) 5000 UNIT/ML injection 5,000 Units, 5,000 Units, Subcutaneous, Q8H JAZMINE    acetaminophen (Tylenol Extra Strength) tab 500 mg, 500 mg, Oral, Q4H PRN    morphINE PF 4 MG/ML injection 1 mg, 1 mg, Intravenous, Q2H PRN **OR** morphINE PF 4 MG/ML injection 2 mg, 2 mg, Intravenous, Q2H PRN  **OR** morphINE PF 4 MG/ML injection 4 mg, 4 mg, Intravenous, Q2H PRN    polyethylene glycol (PEG 3350) (Miralax) 17 g oral packet 17 g, 17 g, Oral, Daily PRN    sennosides (Senokot) tab 17.2 mg, 17.2 mg, Oral, Nightly PRN    bisacodyl (Dulcolax) 10 MG rectal suppository 10 mg, 10 mg, Rectal, Daily PRN    fleet enema (Fleet) 7-19 GM/118ML rectal enema 133 mL, 1 enema, Rectal, Once PRN    ondansetron (Zofran) 4 MG/2ML injection 4 mg, 4 mg, Intravenous, Q6H PRN    metoclopramide (Reglan) 5 mg/mL injection 10 mg, 10 mg, Intravenous, Q8H PRN    acetaminophen (Tylenol) tab 650 mg, 650 mg, Oral, Q4H PRN **OR** HYDROcodone-acetaminophen (Norco) 5-325 MG per tab 1 tablet, 1 tablet, Oral, Q4H PRN **OR** HYDROcodone-acetaminophen (Norco) 5-325 MG per tab 2 tablet, 2 tablet, Oral, Q4H PRN    cefTRIAXone (Rocephin) 1 g in D5W 100 mL IVPB-ADD, 1 g, Intravenous, Q24H    aspirin chewable tab 81 mg, 81 mg, Oral, Daily    atorvastatin (Lipitor) tab 20 mg, 20 mg, Oral, Nightly    gabapentin (Neurontin) cap 300 mg, 300 mg, Oral, TID    hydroxychloroquine (Plaquenil) tab 200 mg, 200 mg, Oral, BID    vancomycin (Vancocin) 1.25 g in sodium chloride 0.9% 250mL IVPB premix, 1,250 mg, Intravenous, Q24H    metRONIDAZOLE (Flagyl) tab 500 mg, 500 mg, Oral, Q8H JAZMINE    mineral oil-white petrolatum (Artificial Tears) 83-15 % ophthalmic ointment 1 Application, 1 Application, Both Eyes, TID PRN    PHYSICAL EXAM:  Blood pressure 133/63, pulse 75, temperature 98.1 °F (36.7 °C), temperature source Oral, resp. rate 14, height 5' 1\" (1.549 m), weight 161 lb (73 kg), SpO2 99%.    General: Alert, orientated x3.  Affect is normal.  In hospital bed.  No apparent distress.    Extremities:    Left knee has healed incision.  No warmth or erythema.  No effusion'  ROM: 0-beyond 90 does not cause pain.  Left calf area has skin graft which looks fine.  muscle flap which appears to show no obvious fluctuance or pain to palpation.  There is a small open wound over  the posterior aspect of the muscle graft the yellow clear drainage on the gauze.  No pus today.  There is mild swelling.  Mild erythema in this area.  Ankle is NT    LABS:   Recent Labs   Lab 03/05/24  0112   RBC 3.82   HGB 11.9*   HCT 35.7   MCV 93.5   MCH 31.2   MCHC 33.3   RDW 12.5   NEPRELIM 9.31*   WBC 13.0*   .0         Recent Labs   Lab 03/05/24  0112   *   BUN 17   CREATSERUM 0.72   EGFRCR 93   CA 9.1   ALB 4.1   *   K 3.8      CO2 28.0   ALKPHO 72   AST 19   ALT 35   BILT 0.4   TP 7.3     No results for input(s): \"PTP\", \"INR\", \"PTT\" in the last 168 hours.      IMPRESSION AND PLAN:  Patient Active Problem List   Diagnosis    Elevated blood pressure reading without diagnosis of hypertension    Pruritus    Irritability    Menopausal disorder    Tobacco abuse    Benign essential hypertension    Elevated liver enzymes    Hyponatremia    Right lumbar radiculopathy    Rotator cuff syndrome, left    Complete rotator cuff tear of left shoulder    Full thickness tear of left subscapularis tendon    Spondylolisthesis of lumbar region    Spinal stenosis of lumbar region with neurogenic claudication    Lumbar radiculopathy    Unstable gait    Foot drop, bilateral    Neuropathy    Numbness and tingling of both feet    Risk for falls    Cervical myelopathy (HCC)    Cervical myelopathy (HCC)    Hypokalemia    Anemia    Azotemia    Hyperglycemia    Left knee pain    Left knee pain, unspecified chronicity    Open wound of left knee    Surgical wound, non healing    Necrosis (HCC)    Sloughing of wound    Left leg swelling    Infection of deep incisional surgical site after procedure, initial encounter    Cellulitis of left lower extremity    Cellulitis of left knee    Abscess of left leg    Cellulitis of left leg       Left leg cellulitis with open wound:  Knee joint appears to be doing fine.  No increase in any knee symptoms.  Calf area where muscle flap work was done has a small open wound from  trauma at home with mild swelling and erythema cw with cellulitis.    She is on abx currently [er ID  Plastics is already consulted.  No ortho intervention anticipated.  Please re-consult if situation changes with knee.          Osmin Mauricio MD  [facility]  3/5/2024  5:48 PM

## 2024-03-06 LAB
ANION GAP SERPL CALC-SCNC: 2 MMOL/L (ref 0–18)
BASOPHILS # BLD AUTO: 0.02 X10(3) UL (ref 0–0.2)
BASOPHILS NFR BLD AUTO: 0.3 %
BUN BLD-MCNC: 13 MG/DL (ref 9–23)
CALCIUM BLD-MCNC: 8.5 MG/DL (ref 8.5–10.1)
CHLORIDE SERPL-SCNC: 111 MMOL/L (ref 98–112)
CO2 SERPL-SCNC: 26 MMOL/L (ref 21–32)
CREAT BLD-MCNC: 0.61 MG/DL
EGFRCR SERPLBLD CKD-EPI 2021: 99 ML/MIN/1.73M2 (ref 60–?)
EOSINOPHIL # BLD AUTO: 0.39 X10(3) UL (ref 0–0.7)
EOSINOPHIL NFR BLD AUTO: 5.1 %
ERYTHROCYTE [DISTWIDTH] IN BLOOD BY AUTOMATED COUNT: 12.8 %
GLUCOSE BLD-MCNC: 87 MG/DL (ref 70–99)
HCT VFR BLD AUTO: 32.3 %
HGB BLD-MCNC: 11.1 G/DL
IMM GRANULOCYTES # BLD AUTO: 0.02 X10(3) UL (ref 0–1)
IMM GRANULOCYTES NFR BLD: 0.3 %
LYMPHOCYTES # BLD AUTO: 1.21 X10(3) UL (ref 1–4)
LYMPHOCYTES NFR BLD AUTO: 15.9 %
MCH RBC QN AUTO: 31.6 PG (ref 26–34)
MCHC RBC AUTO-ENTMCNC: 34.4 G/DL (ref 31–37)
MCV RBC AUTO: 92 FL
MONOCYTES # BLD AUTO: 0.92 X10(3) UL (ref 0.1–1)
MONOCYTES NFR BLD AUTO: 12.1 %
NEUTROPHILS # BLD AUTO: 5.07 X10 (3) UL (ref 1.5–7.7)
NEUTROPHILS # BLD AUTO: 5.07 X10(3) UL (ref 1.5–7.7)
NEUTROPHILS NFR BLD AUTO: 66.3 %
OSMOLALITY SERPL CALC.SUM OF ELEC: 287 MOSM/KG (ref 275–295)
PLATELET # BLD AUTO: 202 10(3)UL (ref 150–450)
POTASSIUM SERPL-SCNC: 4 MMOL/L (ref 3.5–5.1)
POTASSIUM SERPL-SCNC: 4 MMOL/L (ref 3.5–5.1)
RBC # BLD AUTO: 3.51 X10(6)UL
SODIUM SERPL-SCNC: 139 MMOL/L (ref 136–145)
WBC # BLD AUTO: 7.6 X10(3) UL (ref 4–11)

## 2024-03-06 PROCEDURE — 84132 ASSAY OF SERUM POTASSIUM: CPT | Performed by: STUDENT IN AN ORGANIZED HEALTH CARE EDUCATION/TRAINING PROGRAM

## 2024-03-06 PROCEDURE — 85025 COMPLETE CBC W/AUTO DIFF WBC: CPT | Performed by: STUDENT IN AN ORGANIZED HEALTH CARE EDUCATION/TRAINING PROGRAM

## 2024-03-06 PROCEDURE — 80048 BASIC METABOLIC PNL TOTAL CA: CPT | Performed by: STUDENT IN AN ORGANIZED HEALTH CARE EDUCATION/TRAINING PROGRAM

## 2024-03-06 NOTE — PROGRESS NOTES
Patient transferred to room 360 via bed. Oriented to room. Safety precautions initiated. Call light in reach. Dressing reinforced. Vitals stable on room air. Plan of care discussed with patient.

## 2024-03-06 NOTE — PLAN OF CARE
Pt received at change of shift. Alert and oriented x4. On RA/NC at night. Afebrile. NSR on tele monitor. IVF infusing. Tolerating diet. Void in BR. LLE with open wound leaking SS drainage. Tylenol PRN given (see MAR). No family present at bedside. See Flowsheets and MAR for further documentation.     Call light in reach & safety precautions in place

## 2024-03-06 NOTE — PLAN OF CARE
Assumed patient care this morning.   Alert, alert. Breathing unlabored. Tolerating oral intake. Ambulating with assist. Left knee wound with drainage. Bandage replaced.

## 2024-03-06 NOTE — PROGRESS NOTES
[facility]   part of [facility] Infectious Disease Progress Note    Nery Purvis Patient Status:  Observation    1960 MRN [id number]   Location [facility] 2SW-S Attending Belgica Jones MD   Hosp Day # 0 PCP Silvia Alfredo MD     Subjective:  Pt with on going drainage of the knee. Pt feeling better overall.  Pt with some UE numbness with vancomycin infusion.     Objective:    Allergies:  Allergies   Allergen Reactions    Tramadol OTHER (SEE COMMENTS)     \"I felt goofy and all my muscles were levi\"    Other RASH     CAPSAICIN CREAM       Medications:    Current Facility-Administered Medications:     heparin (Porcine) 5000 UNIT/ML injection 5,000 Units, 5,000 Units, Subcutaneous, Q8H JAZMINE    acetaminophen (Tylenol Extra Strength) tab 500 mg, 500 mg, Oral, Q4H PRN    morphINE PF 4 MG/ML injection 1 mg, 1 mg, Intravenous, Q2H PRN **OR** morphINE PF 4 MG/ML injection 2 mg, 2 mg, Intravenous, Q2H PRN **OR** morphINE PF 4 MG/ML injection 4 mg, 4 mg, Intravenous, Q2H PRN    polyethylene glycol (PEG 3350) (Miralax) 17 g oral packet 17 g, 17 g, Oral, Daily PRN    sennosides (Senokot) tab 17.2 mg, 17.2 mg, Oral, Nightly PRN    bisacodyl (Dulcolax) 10 MG rectal suppository 10 mg, 10 mg, Rectal, Daily PRN    fleet enema (Fleet) 7-19 GM/118ML rectal enema 133 mL, 1 enema, Rectal, Once PRN    ondansetron (Zofran) 4 MG/2ML injection 4 mg, 4 mg, Intravenous, Q6H PRN    metoclopramide (Reglan) 5 mg/mL injection 10 mg, 10 mg, Intravenous, Q8H PRN    acetaminophen (Tylenol) tab 650 mg, 650 mg, Oral, Q4H PRN **OR** HYDROcodone-acetaminophen (Norco) 5-325 MG per tab 1 tablet, 1 tablet, Oral, Q4H PRN **OR** HYDROcodone-acetaminophen (Norco) 5-325 MG per tab 2 tablet, 2 tablet, Oral, Q4H PRN    cefTRIAXone (Rocephin) 1 g in D5W 100 mL IVPB-ADD, 1 g, Intravenous, Q24H    aspirin chewable tab 81 mg, 81 mg, Oral, Daily    atorvastatin (Lipitor) tab 20 mg, 20 mg, Oral, Nightly     gabapentin (Neurontin) cap 300 mg, 300 mg, Oral, TID    hydroxychloroquine (Plaquenil) tab 200 mg, 200 mg, Oral, BID    vancomycin (Vancocin) 1.25 g in sodium chloride 0.9% 250mL IVPB premix, 1,250 mg, Intravenous, Q24H    metRONIDAZOLE (Flagyl) tab 500 mg, 500 mg, Oral, Q8H JAZMINE    mineral oil-white petrolatum (Artificial Tears) 83-15 % ophthalmic ointment 1 Application, 1 Application, Both Eyes, TID PRN    sodium chloride 0.9% infusion, , Intravenous, Continuous    Physical Exam:  General: Alert, orientated x3.  Cooperative.  No apparent distress.  Vital Signs:  Blood pressure (!) 122/97, pulse 66, temperature 97.5 °F (36.4 °C), temperature source Oral, resp. rate 17, height 5' 1\" (1.549 m), weight 161 lb (73 kg), SpO2 97%.   Temp (24hrs), Av.9 °F (36.6 °C), Min:97.5 °F (36.4 °C), Max:98.1 °F (36.7 °C)      HEENT: Exam is unremarkable.  Without scleral icterus.  Mucous membranes are moist. PERRLA.  Oropharynx is clear.  Neck: No tenderness to palpitation.  Full range of motion to flexion and extension, lateral rotation and lateral flexion of cervical spine.  No JVD. Supple.   Lungs: Clear to auscultation bilaterally.  Cardiac: Regular rate and rhythm. No murmur.  Abdomen:  Soft, non-distended, non-tender, with no rebound or guarding.   Extremities:  No lower extremity edema noted.  Without clubbing or cyanosis.    Skin: L knee wound with purulent drainage, no obvious surrounding cellulitis  Neurologic: Cranial nerves are grossly intact.  Motor strength and sensory examination is grossly normal.  No focal neurologic deficit.    Labs:  Lab Results   Component Value Date    WBC 7.6 2024    HGB 11.1 2024    HCT 32.3 2024    .0 2024    CREATSERUM 0.61 2024    BUN 13 2024     2024    K 4.0 2024    K 4.0 2024     2024    CO2 26.0 2024    GLU 87 2024    CA 8.5 2024         Assessment/Plan:    1.  Post-op L knee wound  dehiscence concerning for PJI  -ortho on consult, no intervention at this time  -on IV ceftriaxone, vancomycin and PO metronidazole  2.  Hx of multiple L knee infections  -s/p L TKA on 4/10/2023  -s/p I&D on 5/22/23 with NPWT placement, wound cultures with MSSA  -s/p I&D on 6/13/23, cultures with MSSA, strep mitis/oralis, prevotella and corynebacterium  -s/p flap with grafting by plastics on 6/21/23, dc on PO cephalexin and flagyl  -readmitted on 7/6/23 with erythema and knee swelling  -s/p joint aspiration on 7/10/23, cultures NG and was dc on IV daptomycin and po flagyl  -hx of LLE cellulitis with abscess s/p IR drainage on 8/5/23, s/p IV daptomycin and ertapenem  3.  Immunocompromised  -hx of RA    If you have any questions or concerns please call [facility] Infectious Disease at [phone].     Portillo Chiu, APRN

## 2024-03-06 NOTE — SPIRITUAL CARE NOTE
Spiritual Care Visit Note    Patient Name: Nery Purvis Date of Spiritual Care Visit: 24   : 1960 Primary Dx: Cellulitis of left leg       Referred By: Referral From: Nurse    Spiritual Care Taxonomy:    Intended Effects: Aligning care plan with patient's values    Methods: Collaborate with care team member    Interventions: Assist someone with Advance Directives    Visit Type/Summary:     - PoA: New PoAH Created:  remains available for follow up.    Spiritual Care support can be requested via an [facility] consult. For urgent/immediate needs, please contact the On Call  at: Edward: ext [phone]    [facility]  Spiritual Care department

## 2024-03-07 VITALS
TEMPERATURE: 98 F | WEIGHT: 161 LBS | HEART RATE: 59 BPM | DIASTOLIC BLOOD PRESSURE: 53 MMHG | HEIGHT: 61 IN | RESPIRATION RATE: 17 BRPM | SYSTOLIC BLOOD PRESSURE: 133 MMHG | OXYGEN SATURATION: 97 % | BODY MASS INDEX: 30.4 KG/M2

## 2024-03-07 LAB
ANION GAP SERPL CALC-SCNC: 0 MMOL/L (ref 0–18)
BASOPHILS # BLD AUTO: 0.06 X10(3) UL (ref 0–0.2)
BASOPHILS NFR BLD AUTO: 0.9 %
BUN BLD-MCNC: 13 MG/DL (ref 9–23)
CALCIUM BLD-MCNC: 8.9 MG/DL (ref 8.5–10.1)
CHLORIDE SERPL-SCNC: 109 MMOL/L (ref 98–112)
CO2 SERPL-SCNC: 30 MMOL/L (ref 21–32)
CREAT BLD-MCNC: 0.84 MG/DL
EGFRCR SERPLBLD CKD-EPI 2021: 77 ML/MIN/1.73M2 (ref 60–?)
EOSINOPHIL # BLD AUTO: 0.57 X10(3) UL (ref 0–0.7)
EOSINOPHIL NFR BLD AUTO: 8.5 %
ERYTHROCYTE [DISTWIDTH] IN BLOOD BY AUTOMATED COUNT: 12.5 %
GLUCOSE BLD-MCNC: 98 MG/DL (ref 70–99)
HCT VFR BLD AUTO: 33.6 %
HGB BLD-MCNC: 11.3 G/DL
IMM GRANULOCYTES # BLD AUTO: 0.03 X10(3) UL (ref 0–1)
IMM GRANULOCYTES NFR BLD: 0.4 %
LYMPHOCYTES # BLD AUTO: 1.61 X10(3) UL (ref 1–4)
LYMPHOCYTES NFR BLD AUTO: 24 %
MCH RBC QN AUTO: 31.2 PG (ref 26–34)
MCHC RBC AUTO-ENTMCNC: 33.6 G/DL (ref 31–37)
MCV RBC AUTO: 92.8 FL
MONOCYTES # BLD AUTO: 1.08 X10(3) UL (ref 0.1–1)
MONOCYTES NFR BLD AUTO: 16.1 %
NEUTROPHILS # BLD AUTO: 3.37 X10 (3) UL (ref 1.5–7.7)
NEUTROPHILS # BLD AUTO: 3.37 X10(3) UL (ref 1.5–7.7)
NEUTROPHILS NFR BLD AUTO: 50.1 %
OSMOLALITY SERPL CALC.SUM OF ELEC: 288 MOSM/KG (ref 275–295)
PLATELET # BLD AUTO: 208 10(3)UL (ref 150–450)
POTASSIUM SERPL-SCNC: 4.1 MMOL/L (ref 3.5–5.1)
RBC # BLD AUTO: 3.62 X10(6)UL
SODIUM SERPL-SCNC: 139 MMOL/L (ref 136–145)
WBC # BLD AUTO: 6.7 X10(3) UL (ref 4–11)

## 2024-03-07 PROCEDURE — 85025 COMPLETE CBC W/AUTO DIFF WBC: CPT | Performed by: HOSPITALIST

## 2024-03-07 PROCEDURE — 80048 BASIC METABOLIC PNL TOTAL CA: CPT | Performed by: HOSPITALIST

## 2024-03-07 PROCEDURE — 97116 GAIT TRAINING THERAPY: CPT

## 2024-03-07 PROCEDURE — 97165 OT EVAL LOW COMPLEX 30 MIN: CPT

## 2024-03-07 PROCEDURE — 97161 PT EVAL LOW COMPLEX 20 MIN: CPT

## 2024-03-07 RX ORDER — CEPHALEXIN 500 MG/1
1000 CAPSULE ORAL 3 TIMES DAILY
Qty: 84 CAPSULE | Refills: 0 | Status: SHIPPED | OUTPATIENT
Start: 2024-03-07 | End: 2024-03-21

## 2024-03-07 RX ORDER — CEFAZOLIN SODIUM/WATER 2 G/20 ML
2 SYRINGE (ML) INTRAVENOUS EVERY 8 HOURS
Status: DISCONTINUED | OUTPATIENT
Start: 2024-03-07 | End: 2024-03-07

## 2024-03-07 NOTE — PROGRESS NOTES
AVS reviewed, will start po Keflex, ready for  at own pharmacy , IV dc'd, follow-up w/ Dr. Alfredo this week & will follow-up w/ wound care clinic, dsg changed to ROSS, will dc once ride arrives.

## 2024-03-07 NOTE — PLAN OF CARE
A&O x 4. VSS. On RA. No c/o pain. Dressing to left leg C/D/I. N/T to BLE's per baseline. Voiding without issue. BM this am. Up with min assist and walker. SCD's. Plan home when ready. Will continue to monitor.

## 2024-03-07 NOTE — OCCUPATIONAL THERAPY NOTE
OCCUPATIONAL THERAPY EVALUATION - INPATIENT    Room Number: 360/360-A  Evaluation Date: 3/7/2024     Type of Evaluation: Initial  Presenting Problem: LLE infection, cellulitis    Physician Order: IP Consult to Occupational Therapy  Reason for Therapy:  ADL/IADL Dysfunction and Discharge Planning      OCCUPATIONAL THERAPY ASSESSMENT   Patient is a 64 year old female admitted on 3/5/2024 with Presenting Problem: LLE infection, cellulitis. Co-Morbidities : HTN, L TKA April 2023, benign ess HTN, neuropathy, RA, HLD I&D of LLE (5/23, 6/23, 8/23), muscle flap on 6/21/23  Patient is currently functioning near baseline with toileting, lower body dressing, and dynamic standing balance.  Prior to admission, patient's baseline is independent.  Patient met all OT goals at supervision to Mod I level.  Patient reports no further questions/concerns at this time.       No further skilled OT needs at this time.      WEIGHT BEARING RESTRICTION  Weight Bearing Restriction: None                Recommendations for nursing staff:   Transfers: 1-person  Toileting location: Toilet    EVALUATION SESSION:  Patient at start of session: chair  FUNCTIONAL TRANSFER ASSESSMENT  Sit to Stand: Edge of Bed; Chair  Edge of Bed: Supervision  Chair: Supervision  Toilet Transfer: Supervision (standard toilet, light use of grab bar, reports RTS with arms at home)    BED MOBILITY  Supine to Sit : Modified Independent  Sit to Supine (OT): Modified Independent    BALANCE ASSESSMENT     FUNCTIONAL ADL ASSESSMENT  LB Dressing Seated: Supervision (with use of AE, reports uses hip kit baseline)  LB Dressing Standing: Supervision (clothing management to/from waist)  Toileting Seated: Supervision (with toileting aide)  Toileting Standing: Supervision (clothing management)      ACTIVITY TOLERANCE: WFL                         O2 SATURATIONS       COGNITION  Overall Cognitive Status:  WFL - within functional limits  COGNITION ASSESSMENTS       Upper Extremity:    ROM: within functional limits (L shoulder slightly limited   Strength: is within functional limits   Coordination:  Fine motor: functional with increased time due to RA, baseline uses adaptive handles for utensils, adaptive pens    EDUCATION PROVIDED  Patient: Role of Occupational Therapy; Functional Transfer Techniques; Fall Prevention; Compensatory ADL Techniques  Patient's Response to Education: Verbalized Understanding    Equipment used: RW, toileting aide  Demonstrates functional use    Therapist comments: Patient motivated and participatory. Educated on safety precautions and incorporation into ADLs and transfers, followed with good return demo. Patient very familiar with use of AE, uses baseline. Performed all ADLs and functional transfers SBA to Mod I, aware of recommendation for initial supervision during shower transfers/showering. Patient reports will have initial supervision from  at discharge.  Patient reports no further questions or concerns regarding discharge home to ADLs.      Patient End of Session: Up in chair;Needs met;Call light within reach;RN aware of session/findings;All patient questions and concerns addressed    OCCUPATIONAL PROFILE    HOME SITUATION  Type of Home: House  Home Layout: Two level;Bed/bath upstairs  Lives With: Caregiver part-time;Spouse (OT student caregiver (Ashley))    Toilet and Equipment: Comfort height toilet;Toilet riser with arms;3-in-1 commode  Shower/Tub and Equipment: Walk-in shower;Grab bar;Shower chair;Hand-held showerhead  Other Equipment: Toileting aid;Hip kit    Occupation/Status: retired     Drives: No  Patient Regularly Uses: Glasses    Prior Level of Function: Patient reports independent in all BADLs and functional mobility via RW prior to admission. Caregiver (an OT student) comes twice a week to help with exercises, driving to appointments.  Uses hip kit for LB dressing, dons bra over feet first and pulls up, uses built-up handles for utensils,  modified pens due to RA.    SUBJECTIVE  \"I love to get up and move around!\"    PAIN ASSESSMENT  Rating: Unable to rate  Location: mild, LLE  Management Techniques: Activity promotion;Body mechanics;Breathing techniques;Relaxation;Repositioning    OBJECTIVE  Precautions: None  Fall Risk: Standard fall risk    WEIGHT BEARING RESTRICTION  Weight Bearing Restriction: None                AM-PAC ‘6-Clicks’ Inpatient Daily Activity Short Form  -   Putting on and taking off regular lower body clothing?: A Little (supervision)  -   Bathing (including washing, rinsing, drying)?: A Little (supervision)  -   Toileting, which includes using toilet, bedpan or urinal? : A Little (supervision)  -   Putting on and taking off regular upper body clothing?: None  -   Taking care of personal grooming such as brushing teeth?: None  -   Eating meals?: None    AM-PAC Score:  Score: 21  Approx Degree of Impairment: 32.79%  Standardized Score (AM-PAC Scale): 44.27      ADDITIONAL TESTS     NEUROLOGICAL FINDINGS        PLAN   Patient has been evaluated and presents with no skilled Occupational Therapy needs at this time.  Patient discharged from Occupational Therapy services.  Please re-order if a new functional limitation presents during this admission.      Patient Evaluation Complexity Level:   Occupational Profile/Medical History LOW - Brief history including review of medical or therapy records    Specific performance deficits impacting engagement in ADL/IADL LOW  1 - 3 performance deficits    Client Assessment/Performance Deficits LOW - No comorbidities nor modifications of tasks    Clinical Decision Making LOW - Analysis of occupational profile, problem-focused assessments, limited treatment options    Overall Complexity LOW     OT Session Time: 15 minutes

## 2024-03-07 NOTE — DISCHARGE SUMMARY
General Medicine Discharge Summary     Patient ID:  Nery Purvis  64 year old  1/20/1960    Admit date: 3/5/2024    Discharge date and time: 3/7/2024    Attending Physician: Belgica Jones MD     Primary Care Physician: Silvia Alfredo MD     Reason for admission: see HPI    Discharge Diagnoses: Cellulitis of left leg [L03.116]    Discharged Condition: good    Exam: (see progress notes for full details)  No acute distress, alert and oriented    Hospital Course:   Patient is 64-year-old female significant past medical history of rheumatoid arthritis, hypertension, hyperlipidemia, left TKA in 04/2023, complicated by wound infection as well as I&D of left knee in May 2023.  Continue to have drainage from the wound again status post I&D of the left knee showed cultures consistent with MSSA, strep and patella as well as corynebacterium, patient then had flap and grafting surgery by plastic surgery in June 2023 she was discharged on p.o. antibiotics at this time, she subsequently had significant erythema of the knee again in July 2023 s-she was discharged at this time again on IV antibiotics.  This time patient noted that on Saturday left lower extremity calf was very red hot as well as rigid and stiff, also had significant pain,-patient at that time did notice excessive drainage yesterday her pants were drenched because there was pus as well as water coming out through through the open incision, and her leg.  She does not have any fevers or chills, has a caregiver at home, otherwise review of system is complete negative.     In the emergency room patient has been afebrile, rest of vitals been stable  BMP was unremarkable, CBC showed a white count of 13.0  No imaging was done patient was admitted and given Unasyn wound cultures were sent     Infectious disease was consulted     Left surgical wound infection, wound dehiscence  #hx  of Left TKA      -complicated wound infection   -wound culture, staph  sly  -ID on consult  -rocephin , flagyl, vanc -antibiotics have been discontinued changed to Ancef, discharged on p.o. Keflex per infectious disease  -ortho consulted, plastic sugery-no surgical debridement needed at ths time  -blood cx   Leucocytosis improved   -Follow-up with infectious disease, orthopedic surgery as well as plastic surgery as an outpatient, continue disease biotics for another 3 weeks discussed extensively with GI as well        #seronegative RA  -cont plaquenil     #HTN      #HLD         Consults: IP CONSULT TO HOSPITALIST  IP CONSULT TO INFECTIOUS DISEASE  IP CONSULT TO PHARMACY  IP CONSULT TO ORTHOPEDIC SURGERY  IP CONSULT TO PLASTIC SURGERY  IP CONSULT TO SPIRITUAL CARE    Operative Procedures:      Disposition: home    Patient Instructions:   Current Discharge Medication List        START taking these medications    Details   cephalexin 500 MG Oral Cap Take 2 capsules (1,000 mg total) by mouth 3 (three) times daily for 14 days.           CONTINUE these medications which have NOT CHANGED    Details   OMEGA 3-6-9 FATTY ACIDS OR Take 3,600 mg by mouth in the morning, at noon, and at bedtime. 1 tab TID      Zinc 25 MG Oral Tab Take 25 mg by mouth daily.      Cholecalciferol 125 MCG (5000 UT) Oral Tab Take 1 tablet (5,000 Units total) by mouth daily. Pt takes 4000un Vitamin D3      cyanocobalamin 1000 MCG Oral Tab Take 1 tablet (1,000 mcg total) by mouth daily.      Upadacitinib (RINVOQ OR) Take by mouth.      aspirin 81 MG Oral Chew Tab Chew 1 tablet (81 mg total) by mouth daily.      acetaminophen 500 MG Oral Tab Take 1 tablet (500 mg total) by mouth every 6 (six) hours as needed for Pain.      gabapentin 400 MG Oral Cap Take 300 mg by mouth 3 (three) times daily.      hydroxychloroquine 200 MG Oral Tab Take 1 tablet (200 mg total) by mouth 2 (two) times daily.      atorvastatin 20 MG Oral Tab Take 1 tablet (20 mg total) by mouth nightly.      Calcium Citrate-Vitamin D (CALCIUM CITRATE  + D3 OR) Take 1 tablet by mouth daily.      vitamin E 400 UNITS Oral Cap Take 1 capsule (400 Units total) by mouth 2 (two) times daily.      BIOTIN OR Take 1 tablet by mouth daily.      Ascorbic Acid (VITAMIN C OR) Take 1 tablet by mouth daily.      Refresh Lacri-Lube Ophthalmic Ointment Place 1 g into both eyes 3 (three) times daily as needed.      HYDROcodone-acetaminophen 5-325 MG Oral Tab Take 1 tablet by mouth every 4 (four) hours as needed.      polyethylene glycol, PEG 3350, 17 g Oral Powd Pack Take 17 g by mouth daily as needed.      famotidine 20 MG Oral Tab Take 1 tablet (20 mg total) by mouth 2 (two) times daily.      omega-3 fatty acids 1000 MG Oral Cap Take 1,000 mg by mouth daily.           STOP taking these medications       Meloxicam 15 MG Oral Tab        Magnesium 200 MG Oral Tab        TURMERIC OR        loratadine 10 MG Oral Tab        NON FORMULARY                I reconciled current and discharge medications on day of discharge    Follow-up with PCP, ID, Ortho, plastic surgery    No orders of the defined types were placed in this encounter.        Total Time Coordinating Care: Greater than 30 minutes    Patient had opportunity to ask questions and state understand and agree with therapeutic plan as outlined above.     Thank You,    Belgica davis MD

## 2024-03-07 NOTE — PROGRESS NOTES
Infectious Disease Progress Note      Date of admission: 3/5/2024 12:23 AM     Reason for consult: Left knee surgical wound infection    Subjective: Feels well.  Pain under control.  No nausea or vomiting.  No diarrhea.  No shortness of breath.  No cough or sputum production.    The rest of the systems were reviewed and found to be negative except was mentioned above    Interval events: This is a 64-year-old female patient, admitted here on 3/5/2024 with a surgical wound infection after an injury to her left knee.  Cultures currently with staph lugdunensis.  Currently on IV ceftriaxone and vancomycin.  Evaluated by plastic surgery and orthopedics, infection seems to be superficial without involvement of deeper structures    Medications:    ceFAZolin    heparin    acetaminophen    morphINE **OR** morphINE **OR** morphINE    polyethylene glycol (PEG 3350)    sennosides    bisacodyl    fleet enema    ondansetron    metoclopramide    acetaminophen **OR** HYDROcodone-acetaminophen **OR** HYDROcodone-acetaminophen    aspirin    atorvastatin    gabapentin    hydroxychloroquine    mineral oil-white petrolatum    sodium chloride     Allergies:  Allergies   Allergen Reactions    Tramadol OTHER (SEE COMMENTS)     \"I felt goofy and all my muscles were levi\"    Other RASH     CAPSAICIN CREAM       Physical Exam:  Vitals:    03/07/24 0750   BP:    Pulse:    Resp: 17   Temp: 97.6 °F (36.4 °C)     Vitals signs and nursing note reviewed.   Constitutional:       Appearance: Normal appearance.   HENT:      Head: Normocephalic and atraumatic.      Mouth: Mucous membranes are moist.   Neck:      Musculoskeletal: Neck supple.   Cardiovascular:      Rate and Rhythm: Normal rate.   Pulmonary:      Effort: Pulmonary effort is normal. No respiratory distress.   Abdominal:      General: Abdomen is flat. There is no distension.      Palpations: Abdomen is soft. There is no mass.      Tenderness: There is no tenderness. There is no  guarding or rebound.      Hernia: No hernia is present.   Musculoskeletal:      Right lower leg: No edema.      Left lower leg: No edema.   Skin:     General: Skin is warm and dry.   Neurological:      General: No focal deficit present.      Mental Status: Alert and oriented to person, place, and time.       Laboratory data:  I have reviewed all the lab results independently.  Lab Results   Component Value Date    WBC 6.7 03/07/2024    HGB 11.3 03/07/2024    HCT 33.6 03/07/2024    .0 03/07/2024    CREATSERUM 0.84 03/07/2024    BUN 13 03/07/2024     03/07/2024    K 4.1 03/07/2024     03/07/2024    CO2 30.0 03/07/2024    GLU 98 03/07/2024    CA 8.9 03/07/2024      Recent Labs   Lab 03/07/24  0513   RBC 3.62*   HGB 11.3*   HCT 33.6*   MCV 92.8   MCH 31.2   MCHC 33.6   RDW 12.5   NEPRELIM 3.37   WBC 6.7   .0      Microbiology data:  Hospital Encounter on 03/05/24   1. Blood Culture     Status: None (Preliminary result)    Collection Time: 03/05/24  1:13 AM    Specimen: Blood,peripheral   Result Value Ref Range    Blood Culture Result No Growth 2 Days N/A   2. Aerobic Bacterial Culture     Status: Abnormal    Collection Time: 03/05/24  1:12 AM    Specimen: Leg, left; Other   Result Value Ref Range    Aerobic Culture Result 2+ growth Staphylococcus lugdunensis (A) N/A    Aerobic Smear No organisms seen N/A    Aerobic Smear No WBCs seen N/A       Susceptibility    Staphylococcus lugdunensis -  (no method available)     Cefazolin  Sensitive      Clindamycin <=0.25 Sensitive      Erythromycin <=0.25 Sensitive      Gentamicin <=0.5 Sensitive      Levofloxacin <=0.12 Sensitive      Oxacillin 2 Sensitive      Trimethoprim/Sulfa <=10 Sensitive      Vancomycin <=0.5 Sensitive       Impression:  Nery Purvis is a 64 year old female with    Left knee wound dehiscence in the setting of knee replacement back in April 2023  Evaluated by orthopedics and plastics, no evidence of involvement of deeper  structures or joint  Drainage cultures with staph lugdunensis, susceptibilities reviewed  Blood cultures with no growth to date  Currently on IV ceftriaxone and IV vancomycin  History of postop left knee infection  Original knee arthroplasty was in April 2023  Underwent I&D on 5/22/2023  No concerns for a deeper infection at that point  Underwent a synovial aspirate in June that was not consistent with an infection  Underwent further I&D that was superficial with cultures then growing MSSA, strep mitis, Prevotella and corynebacterium  Status post flapping and grafting with plastic surgery in June 2021  Was discharged on p.o. cephalexin and metronidazole at that point  Readmitted in August with a complex fluid collection, concerning for underlying abscess  Status post IR drainage on 8/5 with cultures with no growth to date, at that point it was more likely to be a seroma than an infection  Completed daptomycin and Invanz on 8/24/2023 followed by 10 days of cephalexin and metronidazole  Immunocompromise due to history of rheumatoid arthritis  On Plaquenil and Enbrel    Recommendations:    Discontinue ceftriaxone and vancomycin  Start IV cefazolin 2 g every 8  Discharge patient on Keflex 1 g 3 times daily for 14 days  Follow-up with infectious disease in 2 weeks  Wound care as per plastics and orthopedics  Okay to discharge from ID perspective  ID will sign off, please call us with any questions or changes status.  Thank you for this consultation.    The plan of care was discussed with the primary hospital team, Belgica Jones MD     Recommendations were also discussed with the patient; all questions were answered.     Thank you for this consultation. Please don't hesitate to call the ID team for questions or any acute changes in patient's clinical condition.    Please note that this report has been produced using speech recognition software and may contain errors related to that system including, but not limited  to, errors in grammar, punctuation, and spelling, as well as words and phrases that possibly may have been recognized inappropriately.  If there are any questions or concerns, contact the dictating provider for clarification.    The  Century Cures Act makes medical notes like these available to patients in the interest of transparency. Please be advised this is a medical document. Medical documents are intended to carry relevant information, facts as evident, and the clinical opinion of the practitioner. The medical note is intended as peer to peer communication and may appear blunt or direct. It is written in medical language and may contain abbreviations or verbiage that are unfamiliar.     Tyree Hannah MD  [facility]. Tel: [phone]. Fax: [phone].     Nery Purvis : 1960 MRN: [id number] [facility]: [id number]

## 2024-03-07 NOTE — PROGRESS NOTES
[facility]   part of [facility]     Hospitalist Progress Note     Nery Purvis Patient Status:  Observation    1960 MRN [id number]   Location [facility] 3SW-A Attending Belgica Jones MD   Hosp Day # 0 PCP Silvia Alfredo MD     Chief Complaint: left leg wound dehiscnce     Subjective:     Patient is doing well no fevers overnight, seen by plastics and ortho,     Objective:    Review of Systems:   A comprehensive review of systems was completed; pertinent positive and negatives stated in subjective.    Vital signs:  Temp:  [97.5 °F (36.4 °C)-98.1 °F (36.7 °C)] 97.8 °F (36.6 °C)  Pulse:  [63-74] 71  Resp:  [12-18] 18  BP: (112-145)/(57-97) 136/60  SpO2:  [93 %-100 %] 93 %    Physical Exam:      Gen: No acute distress, alert and oriented x 3  Pulm: Lungs clear bilaterally, good inspiratory effort   CV:  nL S1/S2  Abd: soft, NT/ND, no hepatomegaly, +BS  MSK: moving all extremities, no edema left lower extremity in an dressing dressing with serous fluid saturated  Neuro: no focal deficits  Skin: no rashes/lesions  Psych: normal mood/affect    Diagnostic Data:    Labs:  Recent Labs   Lab 24  0112 24  0741   WBC 13.0* 7.6   HGB 11.9* 11.1*   MCV 93.5 92.0   .0 202.0       Recent Labs   Lab 24  0112 24  0741   * 87   BUN 17 13   CREATSERUM 0.72 0.61   CA 9.1 8.5   ALB 4.1  --    * 139   K 3.8 4.0  4.0    111   CO2 28.0 26.0   ALKPHO 72  --    AST 19  --    ALT 35  --    BILT 0.4  --    TP 7.3  --        Estimated Creatinine Clearance: 69.4 mL/min (based on SCr of 0.61 mg/dL).    No results for input(s): \"TROP\", \"TROPHS\", \"CK\" in the last 168 hours.    No results for input(s): \"PTP\", \"INR\" in the last 168 hours.               Microbiology    Hospital Encounter on 24   1. Blood Culture     Status: None (Preliminary result)    Collection Time: 24  1:13 AM    Specimen: Blood,peripheral   Result Value Ref Range    Blood  Culture Result No Growth 1 Day N/A   2. Aerobic Bacterial Culture     Status: Abnormal (Preliminary result)    Collection Time: 03/05/24  1:12 AM    Specimen: Leg, left; Other   Result Value Ref Range    Aerobic Culture Result 2+ growth Staphylococcus lugdunensis (A) N/A    Aerobic Smear No organisms seen N/A    Aerobic Smear No WBCs seen N/A         Imaging: Reviewed in Epic.    Medications:    heparin  5,000 Units Subcutaneous Q8H JAZMINE    cefTRIAXone  1 g Intravenous Q24H    aspirin  81 mg Oral Daily    atorvastatin  20 mg Oral Nightly    gabapentin  300 mg Oral TID    hydroxychloroquine  200 mg Oral BID    vancomycin  1,250 mg Intravenous Q24H    metRONIDAZOLE  500 mg Oral Q8H JAZMINE       Assessment & Plan:      Patient is 64-year-old female significant past medical history of rheumatoid arthritis, hypertension, hyperlipidemia, left TKA in 04/2023, complicated by wound infection as well as I&D of left knee in May 2023     # Left surgical wound infection, wound dehiscence  #hx  of Left TKA      -complicated wound infection   -wound culture, staph lugdedninses  -ID on consult  -rocephin , flagyl, vanc   -ortho consulted, plastic sugery-no surgical debridement needed at ths time  -blood cx   Leucocytosis improved   -may need pICC line and abx-defer to ID        #seronegative RA  -cont plaquenil     #HTN      #HLD    Belgica Jones MD    Supplementary Documentation:     Quality:  DVT Mechanical Prophylaxis:   SCDs,    DVT Pharmacologic Prophylaxis   Medication    heparin (Porcine) 5000 UNIT/ML injection 5,000 Units      DVT Pharmacologic prophylaxis: Aspirin 162 mg         Code Status: Full Code  Rosas: No urinary catheter in place  Rosas Duration (in days):   Central line:    ALEX:   The 21st Century Cures Act makes medical notes like these available to patients in the interest of transparency. Please be advised this is a medical document. Medical documents are intended to carry relevant information, facts as evident,  and the clinical opinion of the practitioner. The medical note is intended as peer to peer communication and may appear blunt or direct. It is written in medical language and may contain abbreviations or verbiage that are unfamiliar.

## 2024-03-07 NOTE — PHYSICAL THERAPY NOTE
PHYSICAL THERAPY EVALUATION - INPATIENT     Room Number: 360/360-A  Evaluation Date: 3/7/2024  Type of Evaluation: Initial  Physician Order: PT Eval and Treat    Presenting Problem: left leg leaking blood, pus, water  Co-Morbidities : HTN, L TKA April 2023, benign ess HTN, neuropathy, RA, HLD I&D of LLE (5/23, 6/23, 8/23), muscle flap on 6/21/23  Reason for Therapy: Mobility Dysfunction and Discharge Planning    PHYSICAL THERAPY ASSESSMENT   Patient is a 64 year old female admitted 3/5/2024 for left lower extremity wound leaking blood and pus.   Patient is currently functioning near baseline with bed mobility, transfers, gait, stair negotiation, maintaining seated position, standing prolonged periods, and performing household tasks. Prior to admission, patient's baseline is Whitney with use of RW.   Patient currently does not meet criteria for skilled inpatient physical therapy services, however patient will continue to benefit from QID ambulation with nursing staff.  Pt is discharged from IP PT services.  RN aware to re-order if there is a decline in mobility status.      PLAN  Patient has been evaluated and presents with no skilled Physical Therapy needs at this time.  Patient discharged from Physical Therapy services.  Please re-order if a new functional limitation presents during this admission.    GOALS  Patient was able to achieve the following goals ...    Patient was able to transfer At previous, functional level  Safely and independently   Patient able to ambulate on level surfaces Safely w/ RW     HOME SITUATION  Type of Home: House   Home Layout: Two level;Bed/bath upstairs  Stairs to Enter : 6  Railing: No  Stairs to Bedroom: 6  Railing: Yes    Lives With: Caregiver part-time;Spouse (OT student caregiver (Ashley))  Drives: No  Patient Owned Equipment: Rolling walker;Cane;Wheelchair;Rollator  Patient Regularly Uses: Glasses    Prior Level of [place]: Per pt lives in two story home with spouse. About  6 steps to enter, 6 steps up to bed/bath. Ambulates with RW at all times. Has student OT part time caregiver (2 days per week ~4 hrs total - for exercises/balance/ and errands). Had recently been going to water aerobics class and really enjoyed it. No recent falls.     Recent Admissions:   4/10-4/12/23: L TKA --> [facility]  4/17-4/18/23: FTT s/p TKA --> [facility]  6/12-6/20/23: I&D L TKA, left medial gastroc flap  7/6-7/11/23: cellulitis LE --> [facility]  8/4-8/8/23: LE cellulitis --> [facility]    SUBJECTIVE  \"Ashley will be coming and she'll take me for a walk too!\"    OBJECTIVE  Precautions: None  Fall Risk: Standard fall risk    WEIGHT BEARING RESTRICTION  Weight Bearing Restriction: None                PAIN ASSESSMENT  Rating: Unable to rate  Location: bilateral hands due to RA  Management Techniques: Activity promotion;Body mechanics;Repositioning    COGNITION  Overall Cognitive Status:  WFL - within functional limits    RANGE OF MOTION AND STRENGTH ASSESSMENT  Upper extremity ROM and strength are within functional limits   Lower extremity ROM is within functional limits   Lower extremity strength is within functional limits     BALANCE  Static Sitting: Good  Dynamic Sitting: Fair +  Static Standing: Fair  Dynamic Standing: Fair -    ADDITIONAL TESTS                                    ACTIVITY TOLERANCE                         O2 WALK       NEUROLOGICAL FINDINGS                        AM-PAC '6-Clicks' INPATIENT SHORT FORM - BASIC MOBILITY  How much difficulty does the patient currently have...  Patient Difficulty: Turning over in bed (including adjusting bedclothes, sheets and blankets)?: A Little   Patient Difficulty: Sitting down on and standing up from a chair with arms (e.g., wheelchair, bedside commode, etc.): A Little   Patient Difficulty: Moving from lying on back to sitting on the side of the bed?: A Little   How much help from another person does the patient currently need...   Help from Another: Moving to and from a bed  to a chair (including a wheelchair)?: A Little   Help from Another: Need to walk in hospital room?: A Little   Help from Another: Climbing 3-5 steps with a railing?: A Little       AM-PAC Score:  Raw Score: 18   Approx Degree of Impairment: 46.58%   Standardized Score (AM-PAC Scale): 43.63   CMS Modifier (G-Code): CK    FUNCTIONAL ABILITY STATUS  Gait Assessment   Functional Mobility/Gait Assessment  Gait Assistance: Supervision  Distance (ft): 200, 200  Assistive Device: Rolling walker  Pattern:  (NO heel strike, in toeing of RLE)  Stairs: Stairs  How Many Stairs: 4  Device: 2 Rails  Assist: Supervision  Pattern: Ascend;Descend  Ascend: Reciprocal  Descend: Step to    Skilled Therapy Provided     Bed Mobility:  Rolling: NT  Supine to sit: NT- pt reports no difficulty with bed mobility this date and defers need to practice   Sit to supine: NT     Transfer Mobility:  Sit to stand: supervision to RW   Stand to sit: supervision  Gait = supervision w/ RW x 200 feet, 200 feet. No heel strike bilaterally,in toeing of RLE. LLE slight ataxia with a \"kick\"     Therapist's comments:   Patient presents sitting up in bedside chair. Discussed role and goal of physical therapy in hospital setting- pt very familiar with IP rehab services. Denies pain at this time.  Sit to stand at supervision to RW. Ambulated 200 feet w/ RW at supervision; see above for specifics. Ascended/descended 4 steps with use of bilateral rails at supervision. Ambulated 200 feet w/ RW at supervision. Educated on importance of continued mobility and encouraged continued ambulation with nursing staff. Pt verbalizes understanding.     Exercise/Education Provided:  Body mechanics  Energy conservation  Functional activity tolerated  Gait training  Posture  Transfer training    Patient End of Session: Up in chair;Needs met;Call light within reach;RN aware of session/findings;All patient questions and concerns addressed;Discussed recommendations with case  manager/    Patient Evaluation Complexity Level:  History Moderate - 1 or 2 personal factors and/or co-morbidities   Examination of body systems Low - addressing 1-2 elements   Clinical Presentation Low - Stable   Clinical Decision Making Low Complexity     PT Session Time: 25 minutes  Gait Training: 15 minutes

## 2025-01-17 NOTE — BRIEF OP NOTE
Pre-Operative Diagnosis: INPATIENT ROOM 384     Post-Operative Diagnosis: * No post-op diagnosis entered *      Procedure Performed:   LEFT LEG MEDIAL GASTROCNEMIUS FLAP WITH SPLIT THICKNESS SKIN GRAFT    Surgeon(s) and Role:     * Caldwell Moritz, MD - Primary    Assistant(s):  Surgical Assistant.: Meliton English CSA     Surgical Findings: left knee wound          Estimated Blood Loss: 20cc      Tyra Licona MD  6/21/2023  3:51 PM The patient is Stable - Low risk of patient condition declining or worsening    Shift Goals  Clinical Goals: Monitor s/sx bleeding, trend H/H  Patient Goals: Rest  Family Goals: MARE    Progress made toward(s) clinical / shift goals:        Problem: Knowledge Deficit - Standard  Goal: Patient and family/care givers will demonstrate understanding of plan of care, disease process/condition, diagnostic tests and medications  Outcome: Progressing  Note:   Document in Patient Education 1.  Patient and family/caregiver oriented to unit, equipment, visitation policy and means for communicating concern 2.  Complete/review Learning Assessment 3.  Assess knowledge level of disease process/condition, treatment plan, diagnostic tests and medications 4.  Explain disease process/condition, treatment plan, diagnostic tests and medications  Expected end:        Problem: Hemodynamics  Goal: Patient's hemodynamics, fluid balance and neurologic status will be stable or improve  Outcome: Progressing  Note: 1. Monitor vital signs, pulse oximetry and cardiac monitor per provider order and/or policy 2. Maintain blood pressure per provider order 3. Hemodynamic monitoring per provider order 4. Manage IV fluids and IV infusions 5. Monitor intake and output 6. Daily weights per unit policy or provider order 7. Assess peripheral pulses and capillary refill 8. Assess color and body temperature 9. Position patient for maximum circulation/cardiac output 10. Monitor for signs/symptoms of excessive bleeding 11. Assess mental status, restlessness and changes in level of consciousness 12. Monitor temperature and report fever or hypothermia to provider immediately. Consideration of targeted temperature management.        Patient is not progressing towards the following goals:  NA

## (undated) DEVICE — FLOSEAL HEMOSTATIC MATRIX, 5ML: Brand: FLOSEAL HEMOSTATIC MATRIX

## (undated) DEVICE — SUT VICRYL 2-0 CP-1 J266H

## (undated) DEVICE — NVM5 XLIF ACTIVATOR ELEC KIT

## (undated) DEVICE — SUT PLAIN GUT 5-0 PC-1 1915G

## (undated) DEVICE — UNDYED BRAIDED (POLYGLACTIN 910), SYNTHETIC ABSORBABLE SUTURE: Brand: COATED VICRYL

## (undated) DEVICE — PTFE COATED BLADE 4': Brand: MEDLINE

## (undated) DEVICE — SOL  .9 1000ML BTL

## (undated) DEVICE — TZ MEDICAL TITANIUM DISTRACTION PINS 14MM: Brand: TZ MEDICAL TITANIUM DISTRACTION PINS

## (undated) DEVICE — APPLICATOR CHLORAPREP 26ML

## (undated) DEVICE — STERILE HOOK LOCK LATEX FREE ELASTIC BANDAGE 6INX5YD: Brand: HOOK LOCK™

## (undated) DEVICE — SOLUTION  .9 3000ML

## (undated) DEVICE — GLOVE RADIATION SZ 7-1/2

## (undated) DEVICE — STERILE SYNTHETIC POLYISOPRENE POWDER-FREE SURGICAL GLOVES WITH HYDROGEL COATING: Brand: PROTEXIS

## (undated) DEVICE — MAXCESS C MODULE

## (undated) DEVICE — LAMINECTOMY CDS: Brand: MEDLINE INDUSTRIES, INC.

## (undated) DEVICE — ATTUNE PINNING SYSTEM
Type: IMPLANTABLE DEVICE | Site: SHOULDER
Brand: ATTUNE

## (undated) DEVICE — SUTURE VICRYL 2-0 CP-2

## (undated) DEVICE — ATTAHJA VAC WITH 22MM CONNECTR

## (undated) DEVICE — STERILE POLYISOPRENE POWDER-FREE SURGICAL GLOVES: Brand: PROTEXIS

## (undated) DEVICE — TOTAL KNEE CDS: Brand: MEDLINE INDUSTRIES, INC.

## (undated) DEVICE — DISPOSABLE TOURNIQUET CUFF SINGLE BLADDER, DUAL PORT AND QUICK CONNECT CONNECTOR: Brand: COLOR CUFF

## (undated) DEVICE — STAPLER SKIN INSORB 2030

## (undated) DEVICE — HEAD AND NECK CDS-LF: Brand: MEDLINE INDUSTRIES, INC.

## (undated) DEVICE — SUT VICRYL 2-0 SH J417H

## (undated) DEVICE — DRAPE TABLE COVER 44X90 TC-10

## (undated) DEVICE — EXOFIN TISSUE ADHESIVE 1.0ML

## (undated) DEVICE — DRESS WOUND AQUACEL 3.5INX6INL

## (undated) DEVICE — LIGHT HANDLE

## (undated) DEVICE — DRAPE,U/SHT,SPLIT,FILM,60X84,STERILE: Brand: MEDLINE

## (undated) DEVICE — DELTA XTEND GLENOID CANNULATED STOP DRILL DIA 7.5MM: Brand: DELTA XTEND

## (undated) DEVICE — DRAIN ROUND HUBLESS 15FR

## (undated) DEVICE — DRAPE,T,LAPARO,TRANS,STERILE: Brand: MEDLINE

## (undated) DEVICE — 450 ML BOTTLE OF 0.05% CHLORHEXIDINE GLUCONATE IN 99.95% STERILE WATER FOR IRRIGATION, USP AND APPLICATOR.: Brand: IRRISEPT ANTIMICROBIAL WOUND LAVAGE

## (undated) DEVICE — DELTA XTEND LOCKING METAGLENE SCREW DIA 4.5 LG 30MM
Type: IMPLANTABLE DEVICE | Site: SHOULDER | Status: NON-FUNCTIONAL
Brand: DELTA XTEND
Removed: 2022-08-25

## (undated) DEVICE — #15 STERILE STAINLESS BLADE: Brand: STERILE STAINLESS BLADES

## (undated) DEVICE — PIN DSTRCT 14MM

## (undated) DEVICE — STOCK SURG XL 48X12IN

## (undated) DEVICE — Device

## (undated) DEVICE — DELTA XTEND SCREW GUIDE PIN 1.2: Brand: DELTA XTEND

## (undated) DEVICE — STERILE PRESSURE PROTECTOR PAD® FOR DE MAYO UNIVERSAL DISTRACTOR® (10/CASE): Brand: DE MAYO UNIVERSAL DISTRACTOR®

## (undated) DEVICE — PREMIUM WET SKIN PREP TRAY: Brand: MEDLINE INDUSTRIES, INC.

## (undated) DEVICE — SUTURE VICRYL 3-0 SH

## (undated) DEVICE — SUTURE MONOCRYL 3-0 PS-2

## (undated) DEVICE — Device: Brand: STABLECUT®

## (undated) DEVICE — SKIN MARKER DUAL TIP WITH RULER CAP AND LABELS: Brand: DEVON

## (undated) DEVICE — SLEEVE KENDALL SCD EXPRESS MED

## (undated) DEVICE — GOWN AERO CHROME XXL

## (undated) DEVICE — SUTURE VICRYL 2-0 FS-1

## (undated) DEVICE — SUTURE VICRYL 2-0 UR-6

## (undated) DEVICE — SHEET,DRAPE,40X58,STERILE: Brand: MEDLINE

## (undated) DEVICE — MARKER SKIN PREP RESIST STRL

## (undated) DEVICE — MEDI-VAC SUCTION HANDLE REGULAR CAPACITY: Brand: CARDINAL HEALTH

## (undated) DEVICE — 3M(TM) TEGADERM(TM) TRANSPARENT FILM DRESSING FRAME STYLE 1628: Brand: 3M™ TEGADERM™

## (undated) DEVICE — SYRINGE BULB 50/CS 48/PLT: Brand: MEDEGEN MEDICAL PRODUCTS, LLC

## (undated) DEVICE — COVER,BOOT,FOAM,NON-SKID,HOOK-LOOP,XLG: Brand: MEDLINE INDUSTRIES, INC.

## (undated) DEVICE — PADDING CAST COTTON  4

## (undated) DEVICE — HEMOCLIP HORIZON SM 001200

## (undated) DEVICE — SPECIMAN CONTAINER 4OZ STERILE

## (undated) DEVICE — BOWL CEMENT MIX QUICK-VAC

## (undated) DEVICE — SUT MONOCRYL 4-0 PS-2 Y496G

## (undated) DEVICE — ZIMMER SKIN GRAFT CARRIER 8 INCH LENGTH: Brand: DERMACARRIERS

## (undated) DEVICE — SUT VICRYL 1 CPX J569H

## (undated) DEVICE — STERILE COTTON BALLS LARGE 5/P: Brand: MEDLINE

## (undated) DEVICE — HANDPIECE SET WITH HIGH FLOW TIP AND SUCTION TUBE: Brand: INTERPULSE

## (undated) DEVICE — Device: Brand: INTELLICART™

## (undated) DEVICE — GOWN SURG AERO CHROME XXL

## (undated) DEVICE — PROXIMATE SKIN STAPLERS (35 WIDE) CONTAINS 35 STAINLESS STEEL STAPLES (FIXED HEAD): Brand: PROXIMATE

## (undated) DEVICE — UNIVERSAL STERIBUMP® STERILE (5/CASE): Brand: UNIVERSAL STERIBUMP®

## (undated) DEVICE — HEMOCLIP HORIZON MED 002200

## (undated) DEVICE — PLASTIC BREAST CDS-LF: Brand: MEDLINE INDUSTRIES, INC.

## (undated) DEVICE — MEGADYNE E-Z CLEAN BLADE 2.75"

## (undated) DEVICE — MEGADYNE ELECTRODE ADULT PT RT

## (undated) DEVICE — E-Z BUTTON SWITCH PENCIL

## (undated) DEVICE — RELINE POWER

## (undated) DEVICE — PAD,ABDOMINAL,8"X7.5",ST,LF,20/BX: Brand: MEDLINE INDUSTRIES, INC.

## (undated) DEVICE — DRAPE THERMAL BASIN

## (undated) DEVICE — 3M™ MICROFOAM™ TAPE 1528-4: Brand: 3M™ MICROFOAM™

## (undated) DEVICE — YANKAUER,FLEXIBLE HANDLE,REGLR CAPACITY: Brand: MEDLINE INDUSTRIES, INC.

## (undated) DEVICE — SOL NACL IRRIG 0.9% 1000ML BTL

## (undated) DEVICE — COVER,MAYO STAND,STERILE: Brand: MEDLINE

## (undated) DEVICE — DRESS WOUND AQUACEL 3.5INX12IN

## (undated) DEVICE — PAD SACRAL SPAN AID

## (undated) DEVICE — 3.0MM PRECISION ROUND

## (undated) DEVICE — 3M™ IOBAN™ 2 ANTIMICROBIAL INCISE DRAPE 6648EZ: Brand: IOBAN™ 2

## (undated) DEVICE — HOOD: Brand: FLYTE

## (undated) DEVICE — Device: Brand: POWER-FLO®

## (undated) DEVICE — SCD SLEEVE KNEE HI BLEND

## (undated) DEVICE — BNDG COHESIVE W4INXL5YD TAN E

## (undated) DEVICE — STRL PENROSE DRAIN 18" X 1/2": Brand: CARDINAL HEALTH

## (undated) DEVICE — 3M™ STERI-STRIP™ REINFORCED ADHESIVE SKIN CLOSURES, R1547, 1/2 IN X 4 IN (12 MM X 100 MM), 6 STRIPS/ENVELOPE: Brand: 3M™ STERI-STRIP™

## (undated) DEVICE — BLADE ELECTRODE: Brand: EDGE

## (undated) DEVICE — SUTURE VICRYL 0 UR-6

## (undated) DEVICE — SPK10022 SCHLEIN POSITIONING KIT: Brand: SPK10022 SCHLEIN POSITIONING KIT

## (undated) DEVICE — OCCLUSIVE GAUZE STRIP OVERWRAP,3% BISMUTH TRIBROMOPHENATE IN PETROLATUM BLEND: Brand: XEROFORM

## (undated) DEVICE — C-ARMOR C-ARM EQUIPMENT COVERS CLEAR STERILE UNIVERSAL FIT 12 PER CASE: Brand: C-ARMOR

## (undated) DEVICE — SUT CHROMIC GUT 3-0 SH G122H

## (undated) DEVICE — 3M™ IOBAN™ 2 ANTIMICROBIAL INCISE DRAPE 6651EZ: Brand: IOBAN™ 2

## (undated) DEVICE — CSTM UNIVERSAL DRAPE PK: Brand: MEDLINE INDUSTRIES, INC.

## (undated) DEVICE — ORTHO CDS-LF: Brand: MEDLINE INDUSTRIES, INC.

## (undated) DEVICE — WIRE FX PRCPT KRSH BVL BLNT

## (undated) DEVICE — WRAP COOLING KNEE W/ICE PILLOW

## (undated) DEVICE — THE MILL DISPOSABLE - FINE

## (undated) DEVICE — 1010 S-DRAPE TOWEL DRAPE 10/BX: Brand: STERI-DRAPE™

## (undated) DEVICE — KNEE IMMOBILIZER

## (undated) DEVICE — SUT ETHILON 3-0 PS-2 1669H

## (undated) DEVICE — PROXIMATE RH ROTATING HEAD SKIN STAPLERS (35 WIDE) CONTAINS 35 STAINLESS STEEL STAPLES: Brand: PROXIMATE

## (undated) DEVICE — IMPLANTABLE DEVICE
Type: IMPLANTABLE DEVICE | Site: SPINE CERVICAL | Status: NON-FUNCTIONAL
Removed: 2022-03-02

## (undated) DEVICE — TIP BOVIE 4" MEGADYNE

## (undated) DEVICE — WRAP COOLING BACK W/NO PILLOW

## (undated) DEVICE — DERMATOME BLADES: Brand: DERMATOME

## (undated) DEVICE — DELTA XTEND DRILL BIT DIAMETER 2,5 X 170 MM: Brand: DELTA XTEND

## (undated) DEVICE — HOOD, PEEL-AWAY: Brand: FLYTE

## (undated) DEVICE — SUT DEV STRATA 1 CTX SXPP1A400

## (undated) DEVICE — 11.1-M5 MULTIMODALITY KIT 5

## (undated) DEVICE — PAD SACRAL PREMIUM 12X12X1

## (undated) DEVICE — STANDARD HYPODERMIC NEEDLE,POLYPROPYLENE HUB: Brand: MONOJECT

## (undated) DEVICE — BANDAGE,GAUZE,BULKEE II,4.5"X4.1YD,STRL: Brand: MEDLINE

## (undated) DEVICE — CHLORAPREP 26ML APPLICATOR

## (undated) DEVICE — GLOVE RADIATION SZ 8-1/2

## (undated) DEVICE — SUT VICRYL 3-0 SH J416H

## (undated) DEVICE — SOLUTION  .9 1000ML BTL

## (undated) DEVICE — 3.0MM PRECISION NEURO (MATCH HEAD)

## (undated) DEVICE — PEN SKIN MARKING REG TIP VIOLT

## (undated) DEVICE — STERILE POLYISOPRENE POWDER-FREE SURGICAL GLOVES WITH EMOLLIENT COATING: Brand: PROTEXIS

## (undated) DEVICE — KIT SPNL XLIF NRVSN DIL M5

## (undated) DEVICE — WRAP COOLING SHLDR W/ICE PILLO

## (undated) DEVICE — KIT TRC TRIMANO BEACH CHR ARM

## (undated) DEVICE — SUT VICRYL 0 CP-2 J870H

## (undated) DEVICE — SUTURE VICRYL 0 CT-1

## (undated) DEVICE — EVACUATOR URO RELIVAC 100CC

## (undated) DEVICE — DELTA XTEND METAGLENE CENTRAL GUIDE PIN DIAMETER 2,5 X 190 MM: Brand: DELTA XTEND

## (undated) DEVICE — DECANTER BAG 9": Brand: MEDLINE INDUSTRIES, INC.

## (undated) DEVICE — LAPAROTOMY SPONGE - RF AND X-RAY DETECTABLE PRE-WASHED: Brand: SITUATE

## (undated) DEVICE — CAUTERY BLADE 2IN INS E1455

## (undated) DEVICE — C-ARM: Brand: UNBRANDED

## (undated) DEVICE — STERILE PATIENT PROTECTIVE PAD FOR IMP® KNEE POSITIONERS & COHESIVE WRAP (10 / CASE): Brand: DE MAYO KNEE POSITIONER®

## (undated) DEVICE — 3M™ IOBAN™ 2 ANTIMICROBIAL INCISE DRAPE 6650EZ: Brand: IOBAN™ 2

## (undated) DEVICE — SYRINGE 20CC LL TIP

## (undated) DEVICE — NV I-PAS III BEVELED TIP STER

## (undated) DEVICE — KIT ACCESS MAXCESS 4

## (undated) DEVICE — SUT MONOCRYL 3-0 PS-2 Y427H

## (undated) NOTE — IP AVS SNAPSHOT
[address]            (For Outpatient Use Only) Initial Admit Date: 3/2/2022   Inpt/Obs Admit Date: Inpt: 3/2/22 / Obs: N/A   Discharge Date:    Rika Poles:  [de-identified]   MRN: [de-identified]   CSN: [id number]   CEID: [id number]        ENCOUNTER  Patient Class: Inpatient Admitting Provider: Marianela Hess MD Unit: [facility] 3SW-A   Hospital Service: Ortho/Spine Attending Provider: Marianela Hess MD   Bed: 385-A   Visit Type:   Referring Physician: No ref. provider found Billing Flag:    Admit Diagnosis: Cervical myelopathy [facility]) [G95.9]      PATIENT  Legal Name:   Silvina Chi   Legal Sex: Female  Gender ID: Female             [address] [facility] [address] Name:   [address] [place] [address] PCP:  [address] [address] [place]: [phone]   Address:  [address] [place] [address] : 1960 (62 yrs) Mobile: [phone]         City/[facility]: [place]ase[place] [address] Marital:  Language: [place] park: Kerry SSN4: [id number] Sabianism: Bahai - Irma Arms     Race: White Ethnicity: Non  Or [address] [place] [address]   Name Relationship Legal Guardian? Home Phone Work Phone Mobile Phone   1. Jagdish Purvis  2.  [facility]  Daughter    [phone]     GUARANTOR  Guarantor: Silvina Chi : 1960 Home Phone: [phone]   Address: [address] [place] [address]  Sex: Female Work Phone:    [facility], [address]   Rel. to Patient: Self Guarantor ID: [id number]   [address]   Employer: [employer] Status: FULL TIME     COVERAGE  PRIMARY INSURANCE   Payor: Mobile I* Plan: Mobile CHOICE*   Group Number: [id number] Insurance Type: [address] Name: Meredith Heaton : 1959   Subscriber ID: [id number] Pt Rel to Subscriber: Spouse   SECONDARY INSURANCE   Payor:  Plan:    Group Number:  Insurance Type:    Subscriber Name:  Subscriber :    Subscriber ID:  Pt Rel to Subscriber:    TERTIARY INSURANCE   Payor:  Plan: Group Number:  Insurance Type:    Subscriber Name:  Subscriber :    Subscriber ID:  Pt Rel to Subscriber:    Hospital Account Financial Class: Managed Care    2022

## (undated) NOTE — LETTER
OUTSIDE TESTING RESULT REQUEST     IMPORTANT: FOR YOUR IMMEDIATE ATTENTION  Please FAX all test results listed below to: [phone]         * * * * If testing is NOT complete, arrange with patient A.S.A.P. * * * *      Patient Name: Angy Kelsey

## (undated) NOTE — IP AVS SNAPSHOT
[address]            (For Outpatient Use Only) Initial Admit Date: 2022   Inpt/Obs Admit Date: Inpt: N/A / Obs: N/A   Discharge Date:    Hospital Acct:  [de-identified]   MRN: [de-identified]   CSN: [id number]   CEID: [id number]        ENCOUNTER  Patient Class: OPIB Admitting Provider: Florentino Stevens MD Unit: [facility] 3SW-A   Hospital Service: Ortho/Spine Attending Provider: Florentino Stevens MD   Bed: 355-A   Visit Type:   Referring Physician: No ref. provider found Billing Flag:    Admit Diagnosis: RIGHT ROTATOR [address] [facility]      PATIENT  Legal Name:   Dianne Estrada   Legal Sex: Female  Gender ID: Female             Pref Name:   [facility] PCP:  [address] [address]: [phone]   Address:  [address] [place] [address] : 1960 (62 yrs) Mobile: [phone]         City/[facility]: [facility] [address] Marital:  Language: Sheffieldcaleb sandoval: Kerry SSN4: [id number] Scientologist: Spiritism - Yashiraronia Beavers     Race: White Ethnicity: Non  Or [address] [place] [address] Street   Name Relationship Legal Guardian? Home Phone Work Phone Mobile Phone   1. Jagdish Purvis  2.  [facility]  Daughter    [phone]     GUARANTOR  Guarantor: Dianne Estrada : 1960 Home Phone: [phone]   Address: [address] [place] [address]  Sex: Female Work Phone:    City/[facility] [employer]    Rel. to Patient: Self Guarantor ID: [id number]   [address]   Employer: [employer] Status: FULL TIME     COVERAGE  PRIMARY INSURANCE   Payor: Mobile I* Plan: [place] [employer] Mobile*   Group Number: [id number] Insurance Type: [address] Name: Suzette Bedolla : 1959   Subscriber ID: [id number] Pt Rel to Subscriber: Spouse   SECONDARY INSURANCE   Payor:  Plan:    Group Number:  Insurance Type:    Subscriber Name:  Subscriber :    Subscriber ID:  Pt Rel to Subscriber:    TERTIARY INSURANCE   Payor:  Plan: Group Number:  Insurance Type:    Subscriber Name:  Subscriber :    Subscriber ID:  Pt Rel to Subscriber:    Hospital Account Financial Class: Managed Care    2022

## (undated) NOTE — LETTER
[facility] Testing Department  Phone: [phone]  Right Fax: [phone]  [address] By:  Hetal Joseph RN Date: 10/22/21    Patient Name: [facility]  Surgery Date: 10/26/2021    CSN: [id number]  Medical Record: [facility]

## (undated) NOTE — LETTER
Date: 6/7/2023  Patient name: Abhi López  YOB: 1960  Medical Record Number: [id number]  Primary Coverage: Payor: Starla Javier / Plan: [id number] / Product Type: HMO /   Secondary Coverage:   Insurance ID: [id number]  Patient Address: [facility] [address]  Telephone Information:   Home Phone [phone]   Mobile [phone]       Encounter Date: 6/7/2023  Provider: Ramakrishna Santamaria MD  Diagnosis: (A77.826J) Open wound of left knee, subsequent encounter  (primary encounter diagnosis)  (T81.89XD) Non-healing surgical wound, subsequent encounter  (I96) Necrosis ([address]  (M79.89) Left leg swelling  (R23.8) Sloughing of wound      Wound 05/22/23 #1 Left knee Old surgical Knee Anterior; Left (Active)   Date First Assessed/Time First Assessed: 05/22/23 1511    Wound Number (Wound Clinic Only): #1 Left knee  Primary Wound Type: Old surgical  Location: Knee  Wound Location Orientation: Anterior; Left      Assessments 5/22/2023  3:11 PM 6/7/2023 10:04 AM   Wound Image         Drainage Amount Unable to assess Moderate   Drainage Description -- Serosanguineous   Treatments -- Compression; Wound Vac - Neg Pressure   Wound Length (cm) 4 cm 3.4 cm   Wound Width (cm) 1.8 cm 2.5 cm   Wound Surface Area (cm^2) 7.2 cm^2 8. 5 cm^2   Wound Depth (cm) 0 cm 0.5 cm   Wound Volume (cm^3) 0 cm^3 4. 25 cm^3   Margins Well-defined edges Well-defined edges;Epibole (Rolled edges)   Non-staged Wound Description Full thickness Full thickness   Sarai-wound Assessment Edema;Dry Edema; Red   Wound Granulation Tissue -- Spongy;Pink   Wound Bed Granulation (%) -- 5 %   Wound Bed Slough (%) -- 70 %   Wound Bed Eschar (%) 100 % --   Wound Odor None None   Exposed Structure -- Tendon Necrosis   Shape -- 25% tendon   Tunneling -- 12 o'clock @ 0.5 cm       Inactive Orders   Date Order Priority Status Authorizing Provider   06/07/23 1045 Debridement Routine Completed Brittany Caba MD   05/22/23 1615 Debridement Routine Completed Priyanka Finch MD       Negative Pressure Wound Therapy Knee Anterior; Left (Active)   Placement Date: 06/07/23   Location: Knee  Wound Location Orientation: Anterior; Left      Assessments 6/7/2023 10:57 AM   Wound photographed/measured Yes   Machine Status (On) Yes   Site Assessment Clean   Sarai-wound Assessment Edema   Unit Type KCI/3M   Dressing Type Black foam   Number of Foam Pieces Used 1   Cycle Continuous   Target Pressure (mmHg) 125   Canister Changed Yes       No associated orders. Wound Number: All wounds    Wound Cleaning and Dressings:  Showering directions: May shower with protection  Wound cleansing:  Cleanse with normal saline or wound cleanser  Wound cleaning frequency:  during dressing changes  Wound product: Other SORBACT to exposed tendon, june to woundbed, NPWT  Dressing change frequency:  Change dressing 3x per week  Enzymatic agent:  Not applicable    Compression Therapy:   OtherSpandagrip E from base of toes to knee, ACE wrap over knee to mid-thigh, Knee brace **PLEASE MAKE SURE THE VAC TUBING IS NOT UNDER ANY COMPRESSION OR BRACE**    Negative Pressure Wound Therapy:  NPWT: KCI vac therapy, black form at 125 mmHg continuous. RN to change dressing 3x/week unless indicated below SORBACT (GREEN MESH) to be placed over exposed tendon - black foam should not directly touch tendon or healthy epithelial skin. Miscellaneous/Additional Orders:  Miscellaneous orders: Home health care nurse for wound care    Care Summary:  Care Summary: Discussed Plan of Care at beside with patient. Patient verbally acknowledges understanding of all instructions and all questions were answered. Follow Up:  Return in about 1 week (around 6/14/2023) for Wound followup. Additional Notes: **PLEASE MAKE SURE THE VAC TUBING IS NOT UNDER ANY COMPRESSION OR BRACE** - pt made aware of this as well.

## (undated) NOTE — IP AVS SNAPSHOT
Patient Demographics     Address  [address] [place] [id number] Phone  [phone] (Home)  [phone] (Mobile) *Preferred* E-mail Address  [email or web address]      Emergency Contact(s)     Name Relation Home Work Jaun Urrutiaa [address] 65 movement. • It is recommended to sit in a chair which allows your knees to be at about the same level as your hips. This makes rising from a seated position more feasible. • Avoid overstuffed or plush chairs.  Medium to firm chairs with straight backs sitting/standing at side of your bed. Sleeping  • You will require plenty of rest and sleep after surgery. • Use the position that provides you the most comfort.   • You may use a pillow under knees, between legs, or behind back (if lying on your side not apply lotions or ointments on or near incision. Bathing  • You may shower over the adhesive dressing (Tegaderm) that is in place starting the day of surgery.   • Have someone inspect your dressing prior to each shower to ensure the integrity of the s leg pain/symptoms as post-surgical inflammation sets in. Do not be alarmed as this should gradually improve. Non-medication Based Techniques  • Relaxation techniques  o A way to focus your attention other than on your pain.  You are innately capable of 30 minutes prior to activity to avoid incisional pain. • Take muscle relaxants as needed only if experiencing muscle spasm. • Please allow medications 30-45 minutes to take effect. • Do NOT drink alcohol while on pain medication.   • Monitor need for pa Follow-up Information     Fernando Alfredo MD In 1 week.     Specialty: Family Medicine  Why: follow up 1 week after discharge from rehab  Contact information:  [address] [phone] [place] [address] [place]             Denise Sprague hours as needed for Pain.   Stop taking on: November 9, 2021   Mallory Beard [place]         [address] [place] [address]  Generic drug: Upadacitinib ER  Start taking on: November 10, 2021  Next dose due: Resume on November 10 th in the morning      Take 15 mg by mouth (PERCOCET) 5-325 MG per tab 1 tablet 11/02/21 1306 Given      [id number] oxyCODONE-acetaminophen (PERCOCET) 5-325 MG per tab 1 tablet 11/02/21 1706 Given              Recent Vital Signs       Most Recent Value   Vitals 106/58 Filed at 11/02/2021 1620   Puls daughter was on the phone she is in OT we had a long conversation regarding her mother's condition.   A year prior to year she was doing pretty well she was walking independently she does have rheumatoid arthritis which slows her because of multiple joint i Types: Cigarettes      Smokeless tobacco: Never Used    Vaping Use      Vaping Use: Never used    Substance and Sexual Activity      Alcohol use: Yes        Comment: Social      Drug use: No      Sexual activity: Yes        Partners: Male      Current Medi position     Palpation:        Patient demonstrates pain to palpation lower lumbosacral spine around the belt line     Strength:         Patient demonstrates normal motor examination 5/5 weakness globally about a 4 out of 5 she has foot drop and bilateral L4-5 with severe stenosis the patient's symptoms are becoming worse and she is failed conservative treatment including physical therapy and injection therapy.   Daughter and patient agree surgical intervention is the next best step which we can do from the resulting in neurologic symptoms, blindness (approximately 0.14%) from prolonged prone positioning or medical complications.   The patient understands that the risk of infection may be higher if an epidural steroid injection was performed within 3 months of Neuropathy       Omid hands and feet   • Tobacco abuse 2010   • Visual impairment       glasses            Past Surgical History:   Procedure Laterality Date   •          x2   •  DELIVERY ONLY       • STABISMUS SURG,SUPER-OBLIQ [facility]   Cathlyn Goltz has no significant history of cardiac or pulmonary conditions. She is a good surgical candidate. Patient can proceed with planned surgery at acceptably low risk for perioperative complications.  This consult was sent back the referring physic Consult Orders    1.  Consult to Physical Medicine Rehab [[id number]] ordered by Nikita Mcclelland MD at 10/27/21 [address] [address] 3SW-A Attending Nikita Mcclelland MD   [facility] Day # 1 17.2 mg, 17.2 mg, Oral, Nightly  docusate sodium (COLACE) cap 100 mg, 100 mg, Oral, BID  polyethylene glycol (PEG 3350) (MIRALAX) powder packet 17 g, 17 g, Oral, Daily PRN  magnesium hydroxide (MILK OF MAGNESIA) 400 MG/5ML suspension 30 mL, 30 mL, Oral, Da Diabetes Maternal Grandmother         type 2 diabetes   • Other (cva) Maternal Grandmother    • Diabetes Paternal Grandmother         type 2 diabetes   • Psychiatric Sister         anxiety   • Genito-Urinary Disorder Father         BPH   • Cancer Father and round  ENMT: external inspection of ears and nose within normal limits.  Normal functional hearing  Neck: supple, symmetrical, no thyromegaly appreciated  Lymph: no cervical and no axillary lymphadenopathy  Lungs: Non labored on RA, no wheezing apprecia require an intensive and coordinated interdisciplinary team approach  • Patient requires and shall be able to participate in 3 hours of therapy 5 days a week.   • The patient is reasonably expected to actively participate in and benefit significantly from t current admission: Pt was admitted from home on 10/26/2021 with neuropathy, now s/p L4-L5 fusion. Pt has a past medical history significant for RA. See below for detailed past medical/surgical history.      Problem List  Active Problems:    Neuropathy Little   Help from Another: Climbing 3-5 steps with a railing?: A Little       AM-PAC Score:  Raw Score: 18   Approx Degree of Impairment: 46.58%   Standardized Score (AM-PAC Scale): 43.63   CMS Modifier (G-Code): CK    FUNCTIONAL ABILITY STATUS  Gait Asse compared to previous sessions but reports increased BLE muscle fatigue with each bout. Pt demonstrates high risk of falls based on results of TUG and Elderly Mobility Scale.  Pt would benefit from continued PT to address deficits in endurance, strength, and significant for RA. See below for detailed past medical/surgical history.      Problem List  Active Problems:    Neuropathy      Past Medical History  Past Medical History:   Diagnosis Date   • Back problem    • Benign essential hypertension 3/3/2010   • Hi Scale): 42.13   CMS Modifier (G-Code): CK    FUNCTIONAL ABILITY STATUS  Gait Assessment   Gait Assistance: Contact guard assist (2 min assists for mild LOB )  Distance (ft): 60, 30   Assistive Device: Rolling walker  Pattern: Ataxic;R Steppage;L Steppage;R conservation;Patient education; Family education;Gait training;Neuromuscular re-educate;Range of motion;Strengthening;Stair training;Transfer training;Balance training  Rehab Potential : Good  Frequency (Obs): 3-5x/week    CURRENT GOALS   Goal #1 Patient is hypertension 3/3/2010   • High blood pressure    • Menopause Age 49   • Neuropathy     Omid hands and feet   • Tobacco abuse 2/17/2010   • Visual impairment     glasses       Past Surgical History  Past Surgical History:   Procedure Laterality Date   • C-SE to recover; functional mobility to bathroom via RW, CGA to min assist, increased time; education on toileting and toilet transfer techniques, performed transfer to RTS (reports planning to get RTS with arms or commode before discharge home) via min assist GOALS  Patient will recall all spinal precautions and incorporate into ADLs --> in progress          Occupational Therapy Note signed by Jim Macario OT at 10/31/2021 11:44 AM  Version 1 of 1    Author: Jim Macario OT Service: Rehab Author T ‘6-Clicks’ Inpatient Daily Activity Short Form  How much help from another person does the patient currently need…  -   Putting on and taking off regular lower body clothing?: A Lot  -   Bathing (including washing, rinsing, drying)?: A Lot  -   Toileting, unilateral hand support on RW; functional mobility to hanson and back into room to simulate household distances via RW, min assist, greatly increased time; after seated rest break, patient requesting to attempt bed mobilty; reinforcement on logroll technique (exception of shoes) --> in progress  Patient will perform Toileting with supervision --> in progress     FUNCTIONAL TRANSFER GOALS   Patient will transfer to Toilet with supervision --> in progress     ADDITIONAL GOALS  Patient will recall all spinal prec out    Interventions:  - Follow PT/OT recommendations  - Do not exceed activity abilities  - See additional Care Plan goals for specific interventions   Patient/Family Short Term Goal     Interdisciplinary Adequate for Discharge    Description: Patient's S

## (undated) NOTE — LETTER
OUTSIDE TESTING RESULT REQUEST     IMPORTANT: FOR YOUR IMMEDIATE ATTENTION  Please FAX all test results listed below to: [phone]        * * * * If testing is NOT complete, arrange with patient A.S.A.P. * * * *      Patient Name: Trevin Coburn  Surgery Date: 3/2/2022  CSN: [id number]  Medical Record: [id number]   : 1960 - A: 60 y      Sex: female  Surgeon(s):  Filiberto Ferguson MD  Procedure: Cervical 4 - Cervical 5 Anterior Cervical Discectomy Fusion, Possible Posterior Screws Cervical 4 - Cervical 5.    Anesthesia Type: General     Surgeon: Filiberto Ferguson MD     The following Testing and Time Line are REQUIRED PER ANESTHESIA     EKG READ AND SIGNED WITHIN   90 days  CBC [with Differential & Platelets] within  90 days  CMP (requires 4 hour fast) within  90 days  PT/INR within  30 days  PTT within  30 days  UA with Reflex to Culture within  30 days  MSSA/MRSA Nasal screening within 30 days      Thank You,   Sent by: Espinoza Boyd

## (undated) NOTE — IP AVS SNAPSHOT
[address]            (For Outpatient Use Only) Initial Admit Date: 10/26/2021   Inpt/Obs Admit Date: Inpt: 10/26/21 / Obs: N/A   Discharge Date:    Skip Giovanna:  [de-identified]   MRN: [de-identified]   CSN: [id number]   CEID: [id number] Insurance Type:    Subscriber Name:  Subscriber :    Subscriber ID:  Pt Rel to Subscriber:    Hospital Account Financial Class: Managed Care    2021

## (undated) NOTE — LETTER
OUTSIDE TESTING RESULT REQUEST     IMPORTANT: FOR YOUR IMMEDIATE ATTENTION  Please FAX all test results listed below to: [phone]     Testing already done on or about: ASAP - needs order for tests; will go to [facility]     * * * * If testing is NOT complete, arrange with patient A.S.A.P. * * * *    Patient Name: Tremayne Newton  Surgery Date: 3/2/2022  CSN: [id number]  Medical Record: [id number]   : 1960 - A: 60 y      Sex: female  Surgeon(s):  Maci Claros MD  Procedure: Cervical 4 - Cervical 5 Anterior Cervical Discectomy Fusion.   Anesthesia Type: General     Surgeon: Maci Claros MD     The following Testing and Time Line are REQUIRED PER ANESTHESIA     UA with Reflex to Culture within  30 days  MSSA/MRSA Nasal screening within 30 days      Thank You,     Sent by: JAIDEN Betancourt  5/13/15

## (undated) NOTE — LETTER
Date: 5/31/2023  Patient name: Gilbert Meadows  YOB: 1960  Medical Record Number: [id number]  Primary Coverage: Payor: Elinor Sanchez / Plan: [id number] / Product Type: HMO /   Secondary Coverage:   Insurance ID: [id number]  Patient Address: [facility] [address]  Telephone Information:   Home Phone [phone]   Mobile [phone]       Encounter Date: 5/31/2023  Provider: Devora Michel MD  Diagnosis: (W03.486J) Open wound of left knee, subsequent encounter  (primary encounter diagnosis)  (T81.89XD) Non-healing surgical wound, subsequent encounter  (I96) Necrosis ([address]  (M79.89) Left leg swelling  (R23.8) Sloughing of wound      Wound 05/22/23 #1 Left knee Old surgical Knee Anterior; Left (Active)   Date First Assessed/Time First Assessed: 05/22/23 1511    Wound Number (Wound Clinic Only): #1 Left knee  Primary Wound Type: Old surgical  Location: Knee  Wound Location Orientation: Anterior; Left      Assessments 5/22/2023  3:11 PM 5/31/2023  8:40 AM   Wound Image         Drainage Amount Unable to assess Small   Drainage Description -- Serosanguineous   Treatments -- Compression   Wound Length (cm) 4 cm 4.2 cm   Wound Width (cm) 1.8 cm 2 cm   Wound Surface Area (cm^2) 7.2 cm^2 8. 4 cm^2   Wound Depth (cm) 0 cm 0.9 cm   Wound Volume (cm^3) 0 cm^3 7. 56 cm^3   Margins Well-defined edges Well-defined edges   Non-staged Wound Description Full thickness Full thickness   Sarai-wound Assessment Edema;Dry Edema   Wound Granulation Tissue -- Pink;Pale Grey;Spongy   Wound Bed Granulation (%) -- 25 %   Wound Bed Slough (%) -- 50 %   Wound Bed Eschar (%) 100 % --   Wound Odor None None   Shape -- 25% tendon   Undermining -- 10-11 o'clock, deepest at 10 @ 1 cm       Inactive Orders   Date Order Priority Status Authorizing Provider   05/22/23 1615 Debridement Routine Completed Megan Zepeda MD     Wound Number:  All wounds    Wound Cleaning and Dressings:  Showering directions: May shower with protection  Wound cleansing:  Cleanse with normal saline or wound cleanser  Wound cleaning frequency: Daily  Wound product: Honey gel, Silver alginate, and Bordered gauze, Once santyl is picked-up do santyl, moistened gauze, bordered gauze. Lightly packing into undermining. Dressing change frequency:  Change dressing daily and/or PRN  Enzymatic agent:  Honey    Compression Therapy:   OtherSpangrip E, Knee immobilizer - encourage to use at all times     Negative Pressure Wound Therapy:  NPWT: KCI vac therapy, black form at 125 mmHg continuous. RN to change dressing 3x/week unless indicated below ** Ordered at today's visit **      Miscellaneous/Additional Orders:  Miscellaneous orders: Home health care nurse for wound care    Care Summary:  Care Summary: Discussed Plan of Care at beside with patient. Patient verbally acknowledges understanding of all instructions and all questions were answered. Follow Up:  Return in about 1 week (around 6/7/2023) for Wound followup.